# Patient Record
Sex: FEMALE | Race: WHITE | NOT HISPANIC OR LATINO | Employment: FULL TIME | URBAN - METROPOLITAN AREA
[De-identification: names, ages, dates, MRNs, and addresses within clinical notes are randomized per-mention and may not be internally consistent; named-entity substitution may affect disease eponyms.]

---

## 2017-05-11 ENCOUNTER — PATIENT MESSAGE (OUTPATIENT)
Dept: OBSTETRICS AND GYNECOLOGY | Facility: CLINIC | Age: 32
End: 2017-05-11

## 2017-05-12 ENCOUNTER — OFFICE VISIT (OUTPATIENT)
Dept: OBSTETRICS AND GYNECOLOGY | Facility: CLINIC | Age: 32
End: 2017-05-12
Payer: COMMERCIAL

## 2017-05-12 ENCOUNTER — TELEPHONE (OUTPATIENT)
Dept: OBSTETRICS AND GYNECOLOGY | Facility: CLINIC | Age: 32
End: 2017-05-12

## 2017-05-12 VITALS
DIASTOLIC BLOOD PRESSURE: 70 MMHG | HEIGHT: 63 IN | WEIGHT: 161.63 LBS | SYSTOLIC BLOOD PRESSURE: 90 MMHG | BODY MASS INDEX: 28.64 KG/M2

## 2017-05-12 DIAGNOSIS — R22.32 AXILLARY LUMP, LEFT: Primary | ICD-10-CM

## 2017-05-12 DIAGNOSIS — Z78.9 BREASTFEEDING (INFANT): ICD-10-CM

## 2017-05-12 PROCEDURE — 99999 PR PBB SHADOW E&M-EST. PATIENT-LVL III: CPT | Mod: PBBFAC,,, | Performed by: OBSTETRICS & GYNECOLOGY

## 2017-05-12 PROCEDURE — 1160F RVW MEDS BY RX/DR IN RCRD: CPT | Mod: S$GLB,,, | Performed by: OBSTETRICS & GYNECOLOGY

## 2017-05-12 PROCEDURE — 99213 OFFICE O/P EST LOW 20 MIN: CPT | Mod: S$GLB,,, | Performed by: OBSTETRICS & GYNECOLOGY

## 2017-05-12 NOTE — TELEPHONE ENCOUNTER
Spoke with pt. Pt advised she would need to be evaluated in the office prior to ordering any test. Pt verbalized understanding. Appt offered/schduled for today at 10:15am. Pt aware time/location/advised to review Mananner raudel

## 2017-05-12 NOTE — PROGRESS NOTES
"CC: left breast lump    Charis Wells is a 31 y.o. female  presents for a concern in her left breast.  She has dense tissue.  Feels a lump up in her left axilla.  NONTENDER. Breastfeeding      Past Medical History:   Diagnosis Date    Allergy     Chronic kidney disease     born with kidney disease been under control since age 12    Keloid cicatrix     Lipoma of unspecified site 2012       Past Surgical History:   Procedure Laterality Date     SECTION      COLONOSCOPY N/A 2016    Procedure: COLONOSCOPY - Miralax split prep;  Surgeon: Jose Antonio Jr., MD;  Location: [facility] ENDO;  Service: Endoscopy;  Laterality: N/A;    LIPOMA RESECTION      right buttocks    TENDON REPAIR      bilateral feet    TONSILLECTOMY         OB History    Para Term  AB SAB TAB Ectopic Multiple Living   2 1 1  1 1   0 1      # Outcome Date GA Lbr Edwin/2nd Weight Sex Delivery Anes PTL Lv   2 Term 16 39w3d  3.26 kg (7 lb 3 oz) F CS-LTranv EPI N Y      Complications: Failure to Progress in First Stage   1 SAB 2015 4w0d                 Family History   Problem Relation Age of Onset    Cancer Mother      breast    Breast cancer Mother 35     chemo, radiation, mastectomy    Cancer Maternal Grandmother      throat    Diabetes Paternal Grandfather     Melanoma Neg Hx     Colon cancer Neg Hx     Ovarian cancer Neg Hx        Social History   Substance Use Topics    Smoking status: Never Smoker    Smokeless tobacco: Never Used    Alcohol use No      Comment: occasional       BP 90/70  Ht 5' 3" (1.6 m)  Wt 73.3 kg (161 lb 9.6 oz)  LMP 2017  BMI 28.63 kg/m2    ROS:  GENERAL: Denies weight gain or weight loss. Feeling well overall.   SKIN: Denies rash or lesions.   HEAD: Denies head injury or headache.   NODES: Denies enlarged lymph nodes.   CHEST: Denies chest pain or shortness of breath.   CARDIOVASCULAR: Denies palpitations or left sided chest pain.   ABDOMEN: No " abdominal pain, constipation, diarrhea, nausea, vomiting or rectal bleeding.   URINARY: No frequency, dysuria, hematuria, or burning on urination.  REPRODUCTIVE: See HPI.   BREASTS: The patient performs breast self-examination and denies pain,FEELS lumps in her left axilla, NO nipple discharge.   HEMATOLOGIC: No easy bruisability or excessive bleeding.  MUSCULOSKELETAL: Denies joint pain or swelling.   NEUROLOGIC: Denies syncope or weakness.   PSYCHIATRIC: Denies depression, anxiety or mood swings.    Physical Exam:    PE:  APPEARANCE: Well nourished, well developed, in no acute distress.  AFFECT: WNL, alert and oriented x 3.  SKIN: No lesions.  NECK: Neck symmetric without masses or thyromegaly.  NODES: No inguinal, cervical, axillary or femoral lymph node enlargement.  CHEST: Good respiratory effort.   ABDOMEN: Soft. No tenderness or masses. No hepatosplenomegaly. No hernias.  BREASTS: Symmetrical, no skin changes or visible lesions. Left axilla has dense breast tissue,   no nipple discharge bilaterally.            ASSESSMENT AND PLAN  1. Axillary lump, left  US Breast Left Complete   2. Breastfeeding (infant)         Patient was counseled today on breast feeding and breast tissue.    F/U PRN

## 2017-05-12 NOTE — TELEPHONE ENCOUNTER
----- Message from Jordana Sahu MA sent at 5/11/2017  4:50 PM CDT -----  Patient states that she feels something over her armpit and would like a breast ultrasound.

## 2017-05-12 NOTE — MR AVS SNAPSHOT
"    [facility] - OB/GYN 5th Floor  [address]  [facility] [address]  Phone: [phone]                  Charis Wells   2017 10:15 AM   Office Visit    Description:  Female : 1985   Provider:  Lizzie Mackenzie MD   Department:  [address] - OB/GYN 5th Floor           Reason for Visit     Breast Problem                To Do List           Future Appointments        Provider Department Dept Phone    2017 9:15 AM Lizzie Mackenzie MD [facility] - OB/GYN [phone]      Goals (5 Years of Data)     None      [facility]     [facility] On Call Nurse Care Line -  Assistance  Unless otherwise directed by your provider, please contact [facility], our nurse care line that is available for  assistance.     Registered nurses in the [facility] On Call Center provide: appointment scheduling, clinical advisement, health education, and other advisory services.  Call: [phone] (toll free)               Medications           Message regarding Medications     Verify the changes and/or additions to your medication regime listed below are the same as discussed with your clinician today.  If any of these changes or additions are incorrect, please notify your healthcare provider.             Verify that the below list of medications is an accurate representation of the medications you are currently taking.  If none reported, the list may be blank. If incorrect, please contact your healthcare provider. Carry this list with you in case of emergency.           Current Medications     norethindrone (ORTHO MICRONOR) 0.35 mg tablet Take 1 tablet (0.35 mg total) by mouth once daily.    PNV95/FERROUS FUMARATE/FA (PRENATAL MULTIVITAMINS ORAL) Take by mouth Daily.           Clinical Reference Information           Your Vitals Were     BP Height Weight Last Period BMI    90/70 5' 3" (1.6 m) 73.3 kg (161 lb 9.6 oz) 2017 28.63 kg/m2      Blood Pressure          Most Recent Value    " BP  90/70      Allergies as of 5/12/2017     No Known Drug Allergies      Immunizations Administered on Date of Encounter - 5/12/2017     None      Language Assistance Services     ATTENTION: Language assistance services are available, free of charge. Please call [phone].      ATENCIÓN: Si habla elmer, tiene a padilla disposición servicios gratuitos de asistencia lingüística. Llame al [phone].     CHÚ Ý: N?u b?n nói Ti?ng Vi?t, có các d?ch v? h? tr? ngôn ng? mi?n phí dành cho b?n. G?i s? [phone].         [facility] - OB/GYN 5th Floor complies with applicable Federal civil rights laws and does not discriminate on the basis of race, color, national origin, age, disability, or sex.

## 2017-05-15 ENCOUNTER — HOSPITAL ENCOUNTER (OUTPATIENT)
Dept: RADIOLOGY | Facility: HOSPITAL | Age: 32
Discharge: HOME | End: 2017-05-15
Attending: OBSTETRICS & GYNECOLOGY
Payer: COMMERCIAL

## 2017-05-15 DIAGNOSIS — R22.32 AXILLARY LUMP, LEFT: ICD-10-CM

## 2017-05-15 PROCEDURE — 76642 ULTRASOUND BREAST LIMITED: CPT | Mod: TC,LT

## 2017-05-15 PROCEDURE — 76642 ULTRASOUND BREAST LIMITED: CPT | Mod: 26,LT,, | Performed by: RADIOLOGY

## 2017-06-14 ENCOUNTER — OFFICE VISIT (OUTPATIENT)
Dept: OBSTETRICS AND GYNECOLOGY | Facility: CLINIC | Age: 32
End: 2017-06-14
Payer: COMMERCIAL

## 2017-06-14 VITALS
SYSTOLIC BLOOD PRESSURE: 104 MMHG | BODY MASS INDEX: 29.41 KG/M2 | DIASTOLIC BLOOD PRESSURE: 72 MMHG | HEIGHT: 63 IN | WEIGHT: 166 LBS

## 2017-06-14 DIAGNOSIS — Z12.39 SCREENING FOR MALIGNANT NEOPLASM OF BREAST: Primary | ICD-10-CM

## 2017-06-14 DIAGNOSIS — Z80.3 FAMILY HISTORY OF BREAST CANCER: ICD-10-CM

## 2017-06-14 PROCEDURE — 99999 PR PBB SHADOW E&M-EST. PATIENT-LVL III: CPT | Mod: PBBFAC,,, | Performed by: OBSTETRICS & GYNECOLOGY

## 2017-06-14 PROCEDURE — 99395 PREV VISIT EST AGE 18-39: CPT | Mod: S$GLB,,, | Performed by: OBSTETRICS & GYNECOLOGY

## 2017-06-29 NOTE — PROGRESS NOTES
"CC: Well woman exam    Charis Wells is a 31 y.o. female  presents for a well woman exam.  She has no issues, problems, or complaints.  MAY CONSIDER FUTURE FERTILITY    Past Medical History:   Diagnosis Date    Allergy     Chronic kidney disease     born with kidney disease been under control since age 12    Keloid cicatrix     Lipoma of unspecified site 2012       Past Surgical History:   Procedure Laterality Date     SECTION      COLONOSCOPY N/A 2016    Procedure: COLONOSCOPY - Miralax split prep;  Surgeon: Jose Antonio Jr., MD;  Location: [facility];  Service: Endoscopy;  Laterality: N/A;    LIPOMA RESECTION      right buttocks    TENDON REPAIR      bilateral feet    TONSILLECTOMY         OB History    Para Term  AB Living   2 1 1   1 1   SAB TAB Ectopic Multiple Live Births   1     0 1      # Outcome Date GA Lbr Edwin/2nd Weight Sex Delivery Anes PTL Lv   2 Term 16 39w3d  3.26 kg (7 lb 3 oz) F CS-LTranv EPI N JEFFERSON      Complications: Failure to Progress in First Stage   1 SAB 2015 4w0d                 Family History   Problem Relation Age of Onset    Cancer Mother      breast    Breast cancer Mother 35     chemo, radiation, mastectomy    Cancer Maternal Grandmother      throat    Diabetes Paternal Grandfather     Melanoma Neg Hx     Colon cancer Neg Hx     Ovarian cancer Neg Hx        Social History   Substance Use Topics    Smoking status: Never Smoker    Smokeless tobacco: Never Used    Alcohol use No      Comment: occasional       /72   Ht 5' 3" (1.6 m)   Wt 75.3 kg (166 lb 0.1 oz)   LMP 2017 (Approximate)   Breastfeeding? No   BMI 29.41 kg/m²     ROS:  GENERAL: Denies weight gain or weight loss. Feeling well overall.   SKIN: Denies rash or lesions.   HEAD: Denies head injury or headache.   NODES: Denies enlarged lymph nodes.   CHEST: Denies chest pain or shortness of breath.   CARDIOVASCULAR: Denies palpitations " or left sided chest pain.   ABDOMEN: No abdominal pain, constipation, diarrhea, nausea, vomiting or rectal bleeding.   URINARY: No frequency, dysuria, hematuria, or burning on urination.  REPRODUCTIVE: See HPI.   BREASTS: The patient performs breast self-examination and denies pain, lumps, or nipple discharge.   HEMATOLOGIC: No easy bruisability or excessive bleeding.  MUSCULOSKELETAL: Denies joint pain or swelling.   NEUROLOGIC: Denies syncope or weakness.   PSYCHIATRIC: Denies depression, anxiety or mood swings.    Physical Exam:    APPEARANCE: Well nourished, well developed, in no acute distress.  AFFECT: WNL, alert and oriented x 3  SKIN: No acne or hirsutism  NECK: Neck symmetric without masses or thyromegaly  NODES: No inguinal, cervical, axillary, or femoral lymph node enlargement  CHEST: Good respiratory effect  ABDOMEN: Soft.  No tenderness or masses.  No hepatosplenomegaly.  No hernias.  BREASTS: Symmetrical, no skin changes or visible lesions.  No palpable masses, nipple discharge bilaterally.  PELVIC: Normal external genitalia without lesions.  Normal hair distribution.  Adequate perineal body, normal urethral meatus.  Vagina moist and well rugated without lesions or discharge.  Cervix pink, without lesions, discharge or tenderness.  No significant cystocele or rectocele.  Bimanual exam shows uterus to be normal size, regular, mobile and nontender.  Adnexa without masses or tenderness.    EXTREMITIES: No edema.    ASSESSMENT AND PLAN  1. Screening for malignant neoplasm of breast  Mammo Digital Screening Bilat with Tomosynthesis_CAD   2. Family history of breast cancer  Mammo Digital Screening Bilat with Tomosynthesis_CAD       Patient was counseled today on A.C.S. Pap guidelines and recommendations for yearly pelvic exams, mammograms and monthly self breast exams; to see her PCP for other health maintenance.     F/U PRN

## 2017-07-20 ENCOUNTER — HOSPITAL ENCOUNTER (OUTPATIENT)
Dept: RADIOLOGY | Facility: HOSPITAL | Age: 32
Discharge: HOME | End: 2017-07-20
Attending: OBSTETRICS & GYNECOLOGY
Payer: COMMERCIAL

## 2017-07-20 VITALS — WEIGHT: 166 LBS | HEIGHT: 63 IN | BODY MASS INDEX: 29.41 KG/M2

## 2017-07-20 DIAGNOSIS — Z12.31 VISIT FOR SCREENING MAMMOGRAM: ICD-10-CM

## 2017-07-20 DIAGNOSIS — Z80.3 FAMILY HISTORY OF BREAST CANCER: ICD-10-CM

## 2017-07-20 PROCEDURE — 77067 SCR MAMMO BI INCL CAD: CPT | Mod: TC

## 2017-07-20 PROCEDURE — 77067 SCR MAMMO BI INCL CAD: CPT | Mod: 26,,, | Performed by: RADIOLOGY

## 2017-07-20 PROCEDURE — 77063 BREAST TOMOSYNTHESIS BI: CPT | Mod: 26,,, | Performed by: RADIOLOGY

## 2017-07-21 ENCOUNTER — OFFICE VISIT (OUTPATIENT)
Dept: URGENT CARE | Facility: CLINIC | Age: 32
End: 2017-07-21
Payer: COMMERCIAL

## 2017-07-21 VITALS
DIASTOLIC BLOOD PRESSURE: 77 MMHG | HEIGHT: 63 IN | RESPIRATION RATE: 18 BRPM | TEMPERATURE: 99 F | OXYGEN SATURATION: 98 % | BODY MASS INDEX: 28.35 KG/M2 | WEIGHT: 160 LBS | HEART RATE: 92 BPM | SYSTOLIC BLOOD PRESSURE: 109 MMHG

## 2017-07-21 DIAGNOSIS — R11.0 NAUSEA: ICD-10-CM

## 2017-07-21 DIAGNOSIS — J02.0 STREP PHARYNGITIS: Primary | ICD-10-CM

## 2017-07-21 LAB
CTP QC/QA: YES
S PYO RRNA THROAT QL PROBE: POSITIVE

## 2017-07-21 PROCEDURE — 99213 OFFICE O/P EST LOW 20 MIN: CPT | Mod: 25,S$GLB,, | Performed by: NURSE PRACTITIONER

## 2017-07-21 PROCEDURE — 96372 THER/PROPH/DIAG INJ SC/IM: CPT | Mod: S$GLB,,, | Performed by: NURSE PRACTITIONER

## 2017-07-21 PROCEDURE — S0119 ONDANSETRON 4 MG: HCPCS | Mod: S$GLB,,, | Performed by: NURSE PRACTITIONER

## 2017-07-21 PROCEDURE — 87880 STREP A ASSAY W/OPTIC: CPT | Mod: QW,S$GLB,, | Performed by: NURSE PRACTITIONER

## 2017-07-21 RX ORDER — BETAMETHASONE SODIUM PHOSPHATE AND BETAMETHASONE ACETATE 3; 3 MG/ML; MG/ML
9 INJECTION, SUSPENSION INTRA-ARTICULAR; INTRALESIONAL; INTRAMUSCULAR; SOFT TISSUE
Status: COMPLETED | OUTPATIENT
Start: 2017-07-21 | End: 2017-07-21

## 2017-07-21 RX ORDER — AMOXICILLIN 875 MG/1
875 TABLET, FILM COATED ORAL 2 TIMES DAILY
Qty: 20 TABLET | Refills: 0 | Status: SHIPPED | OUTPATIENT
Start: 2017-07-21 | End: 2017-07-31

## 2017-07-21 RX ORDER — ONDANSETRON 4 MG/1
4 TABLET, ORALLY DISINTEGRATING ORAL
Status: COMPLETED | OUTPATIENT
Start: 2017-07-21 | End: 2017-07-21

## 2017-07-21 RX ORDER — ONDANSETRON 4 MG/1
4 TABLET, ORALLY DISINTEGRATING ORAL EVERY 6 HOURS PRN
Qty: 12 TABLET | Refills: 0 | Status: SHIPPED | OUTPATIENT
Start: 2017-07-21 | End: 2017-11-14

## 2017-07-21 RX ORDER — METHYLPREDNISOLONE 4 MG/1
TABLET ORAL
Qty: 1 PACKAGE | Refills: 0 | Status: SHIPPED | OUTPATIENT
Start: 2017-07-21 | End: 2017-11-14

## 2017-07-21 RX ADMIN — BETAMETHASONE SODIUM PHOSPHATE AND BETAMETHASONE ACETATE 9 MG: 3; 3 INJECTION, SUSPENSION INTRA-ARTICULAR; INTRALESIONAL; INTRAMUSCULAR; SOFT TISSUE at 06:07

## 2017-07-21 RX ADMIN — ONDANSETRON 4 MG: 4 TABLET, ORALLY DISINTEGRATING ORAL at 06:07

## 2017-07-21 NOTE — PROGRESS NOTES
"Subjective:       Patient ID: Charis Wells is a 31 y.o. female.    Vitals:  height is 5' 3" (1.6 m) and weight is 72.6 kg (160 lb). Her temperature is 98.9 °F (37.2 °C). Her blood pressure is 109/77 and her pulse is 92. Her respiration is 18 and oxygen saturation is 98%.     Chief Complaint: Sore Throat    Patient presents with Sore Throat & Body Aches. States she has been taking Tylenol with minimal releif. Also states she is having nausea and ha. Denies any sick contacts at this time.      Sore Throat    This is a new problem. The current episode started yesterday. The problem has been gradually worsening. Neither side of throat is experiencing more pain than the other. The maximum temperature recorded prior to her arrival was 102 - 102.9 F. The fever has been present for less than 1 day. The pain is at a severity of 8/10. The pain is mild. Associated symptoms include headaches, swollen glands and trouble swallowing. Pertinent negatives include no abdominal pain, congestion, coughing, diarrhea, ear pain, hoarse voice, shortness of breath or vomiting. She has had no exposure to strep. She has tried acetaminophen for the symptoms. The treatment provided no relief.     Review of Systems   Constitution: Positive for decreased appetite, fever and malaise/fatigue. Negative for chills.   HENT: Positive for headaches, sore throat and trouble swallowing. Negative for congestion, ear pain and hoarse voice.    Eyes: Negative for discharge and redness.   Cardiovascular: Negative for chest pain, dyspnea on exertion and leg swelling.   Respiratory: Negative for cough, shortness of breath, sputum production and wheezing.    Hematologic/Lymphatic: Positive for adenopathy.   Skin: Negative for rash.   Musculoskeletal: Positive for myalgias.   Gastrointestinal: Positive for nausea. Negative for abdominal pain, diarrhea and vomiting.   Neurological: Negative for dizziness and light-headedness.   All other systems reviewed " and are negative.      Objective:      Physical Exam   Constitutional: She is oriented to person, place, and time. Vital signs are normal. She appears well-developed and well-nourished.  Non-toxic appearance. She does not have a sickly appearance. She appears ill.   HENT:   Head: Normocephalic and atraumatic.   Right Ear: External ear normal.   Left Ear: External ear normal.   Nose: Nose normal.   Mouth/Throat: Uvula is midline and mucous membranes are normal. Posterior oropharyngeal erythema present. Tonsils are 2+ on the right. Tonsils are 2+ on the left. No tonsillar exudate.   Eyes: Conjunctivae and EOM are normal. Pupils are equal, round, and reactive to light.   Neck: Normal range of motion. Neck supple.   Cardiovascular: Normal rate, regular rhythm and normal heart sounds.    Pulmonary/Chest: Effort normal and breath sounds normal.   Abdominal: Bowel sounds are normal.   Musculoskeletal: Normal range of motion.   Lymphadenopathy:     She has cervical adenopathy.        Right cervical: Superficial cervical adenopathy present.        Left cervical: Superficial cervical adenopathy present.   Neurological: She is alert and oriented to person, place, and time.   Skin: Skin is warm and dry. Capillary refill takes less than 2 seconds.   Nursing note and vitals reviewed.      Assessment:       1. Strep pharyngitis    2. Nausea        Plan:         Strep pharyngitis  -     POCT rapid strep A  -     betamethasone acetate-betamethasone sodium phosphate injection 9 mg; Inject 1.5 mLs (9 mg total) into the muscle one time.  -     amoxicillin (AMOXIL) 875 MG tablet; Take 1 tablet (875 mg total) by mouth 2 (two) times daily.  Dispense: 20 tablet; Refill: 0  -     methylPREDNISolone (MEDROL DOSEPACK) 4 mg tablet; use as directed  Dispense: 1 Package; Refill: 0    Nausea  -     ondansetron disintegrating tablet 4 mg; Take 1 tablet (4 mg total) by mouth one time.    Other orders  -     ondansetron (ZOFRAN-ODT) 4 MG TbDL; Take  1 tablet (4 mg total) by mouth every 6 (six) hours as needed (nausea).  Dispense: 12 tablet; Refill: 0

## 2017-07-22 NOTE — PATIENT INSTRUCTIONS
Pharyngitis: Strep (Confirmed)       You have had a positive test for strep throat. Strep throat is a contagious illness. It is spread by coughing, kissing or by touching others after touching your mouth or nose. Symptoms include throat pain which is worse with swallowing, aching all over, headache and fever. It is treated with antibiotic medication. This should help you start to feel better within 1-2 days.  Home care  · Rest at home. Drink plenty of fluids to avoid dehydration.  · No work or school for the first 2 days of taking the antibiotics. After this time, you will not be contagious. You can then return to school or work if you are feeling better.   · The antibiotic medication must be taken for the full 10 days, even if you feel better. This is very important to ensure the infection is treated. It is also important to prevent drug-resistent organisms from developing. If you were given an antibiotic shot, no more antibiotics are needed.  · You may use acetaminophen (Tylenol) or ibuprofen (Motrin, Advil) to control pain or fever, unless another medicine was prescribed for this. (NOTE: If you have chronic liver or kidney disease or ever had a stomach ulcer or GI bleeding, talk with your doctor before using these medicines.)  · Throat lozenges or sprays (such as Chloraseptic) help reduce pain. Gargling with warm salt water will also reduce throat pain. Dissolve 1/2 teaspoon of salt in 1 glass of warm water. This may be useful just before meals.   · Soft foods are okay. Avoid salty or spicy foods.  Follow-up care  Follow up with your healthcare provider or our staff if you are not improving over the next week.  When to seek medical advice  Call your healthcare provider right away if any of these occur:  · Fever as directed by your doctor   · New or worsening ear pain, sinus pain, or headache  · Painful lumps in the back of neck  · Stiff neck  · Lymph nodes are getting larger or becoming soft in the  middle  · Inability to swallow liquids, excessive drooling, or inability to open mouth wide due to throat pain  · Signs of dehydration (very dark urine or no urine, sunken eyes, dizziness)  · Trouble breathing or noisy breathing  · Muffled voice  · New rash  Date Last Reviewed: 4/13/2015  © 4748-7676 [employer]. [address] [place] [address], [place] [address]. All rights reserved. This information is not intended as a substitute for professional medical care. Always follow your healthcare professional's instructions.

## 2017-10-19 ENCOUNTER — TELEPHONE (OUTPATIENT)
Dept: OBSTETRICS AND GYNECOLOGY | Facility: CLINIC | Age: 32
End: 2017-10-19

## 2017-10-19 NOTE — TELEPHONE ENCOUNTER
----- Message from Lizzie Mackenzie MD sent at 9/4/2017  4:58 PM CDT -----  Please make sure the patient has follow up with the breast center for further work up for her increased risk for lifetime breast cancer based on history.

## 2017-10-20 ENCOUNTER — TELEPHONE (OUTPATIENT)
Dept: OBSTETRICS AND GYNECOLOGY | Facility: CLINIC | Age: 32
End: 2017-10-20

## 2017-10-20 NOTE — TELEPHONE ENCOUNTER
Called patient. Gave patient number to make appointment with breast center ([phone]) based on an assessment score of 22.89%

## 2017-10-20 NOTE — TELEPHONE ENCOUNTER
----- Message from Beltran Parry sent at 10/20/2017  9:52 AM CDT -----  Contact: Pt   _z  1st Request  _  2nd Request  _  3rd Request        Who: RANDAL GONZALEZ [[id number]]    Why: Returning a call back from your office. Please return the call at earliest convenience.   Thanks!    What Number to Call Back:[phone]    When to Expect a call back: (With in 24 hours)

## 2017-11-14 ENCOUNTER — OFFICE VISIT (OUTPATIENT)
Dept: SURGERY | Facility: CLINIC | Age: 32
End: 2017-11-14
Payer: COMMERCIAL

## 2017-11-14 VITALS
DIASTOLIC BLOOD PRESSURE: 68 MMHG | SYSTOLIC BLOOD PRESSURE: 109 MMHG | HEIGHT: 63 IN | BODY MASS INDEX: 29.95 KG/M2 | WEIGHT: 169.06 LBS | TEMPERATURE: 98 F | HEART RATE: 59 BPM

## 2017-11-14 DIAGNOSIS — Z12.39 BREAST CANCER SCREENING, HIGH RISK PATIENT: Primary | ICD-10-CM

## 2017-11-14 DIAGNOSIS — Z80.3 FAMILY HISTORY OF BREAST CANCER: ICD-10-CM

## 2017-11-14 PROCEDURE — 99202 OFFICE O/P NEW SF 15 MIN: CPT | Mod: S$GLB,,, | Performed by: NURSE PRACTITIONER

## 2017-11-14 PROCEDURE — 99999 PR PBB SHADOW E&M-EST. PATIENT-LVL III: CPT | Mod: PBBFAC,,, | Performed by: NURSE PRACTITIONER

## 2017-11-14 NOTE — PROGRESS NOTES
Subjective:      Patient ID: Charis Wells is a 31 y.o. female.    Chief Complaint: Breast Cancer Screening (High Risk Screening)      HPI: (PF, EPF - 1-3) (Detailed, Comp, - 4) new patient presents to discuss breast cancer risk reported on mmg. Patient denies palpable breast mass, pain, nipple discharge, redness, increased warmth. She d/c breast feeding in 2017. She reports recent CBE, no current breast concerns, declined CBE today     Screening mmg 2017 with extremely dense breasts, Tyrer-Cuzick: 22.89 %.    Menarche at 17   first at 30  LMP 10-    Review of Systems  Objective:   Physical Exam  Assessment:       No diagnosis found.    Plan:       Reviewed results of mammogram and breast cancer risk per Tyrer Cuzick model.     Discussed family history and sporadic verses family clustering verses hereditary breast cancer. Recommended genetic testing for her mother, discussed implications for this patient if found to be positive verses negative, discussed limitations of a negative result in an unaffected individual. She states she will discuss with her mother, who would likely come here for genetic counseling, and will contact me with any results.     Discussed environmental and modifiable risk factors for breast cancer including obesity, alcohol use, smoking.     Discussed there are several models available to estimate a woman's risk for breast cancer with differences in variables contributing to the reported estimated risk compared to the general population risk for breast cancer. Tyrer Cuzick is the model chosen by [facility] and calculated risk was discussed. Discussed an individuals risk may be lower or higher than the score provided. Discussed general population risk for breast cancer and high risk now being defined by ACS and NCCN as 20% or greater. Discussed current recommendations for risk management by ACS and NCCN including increased breast cancer screenings:  semiannual CBE, annual mmg and annual MRI to alternate every 6 months. Discussed pros and cons of screening breast MRI.    Discussed smoking cessation, healthy diet and exercise, and limit alcohol intake to less than one drink/day.     At this time, will plan on her returning in 6 months for CBE and breast MRI as additional breast cancer screening, this may alter depending on her mother's genetic test results.

## 2017-12-08 ENCOUNTER — PATIENT MESSAGE (OUTPATIENT)
Dept: OBSTETRICS AND GYNECOLOGY | Facility: CLINIC | Age: 32
End: 2017-12-08

## 2017-12-11 ENCOUNTER — TELEPHONE (OUTPATIENT)
Dept: OBSTETRICS AND GYNECOLOGY | Facility: CLINIC | Age: 32
End: 2017-12-11

## 2017-12-11 NOTE — TELEPHONE ENCOUNTER
----- Message from Zay Key sent at 12/11/2017  8:37 AM CST -----  Please call new ob pt she had a pos upt now she is having a lot of bleeding [phone]

## 2017-12-12 ENCOUNTER — LAB VISIT (OUTPATIENT)
Dept: LAB | Facility: OTHER | Age: 32
End: 2017-12-12
Attending: OBSTETRICS & GYNECOLOGY
Payer: COMMERCIAL

## 2017-12-12 ENCOUNTER — OFFICE VISIT (OUTPATIENT)
Dept: OBSTETRICS AND GYNECOLOGY | Facility: CLINIC | Age: 32
End: 2017-12-12
Payer: COMMERCIAL

## 2017-12-12 VITALS
SYSTOLIC BLOOD PRESSURE: 116 MMHG | DIASTOLIC BLOOD PRESSURE: 60 MMHG | WEIGHT: 165.38 LBS | HEIGHT: 63 IN | BODY MASS INDEX: 29.3 KG/M2

## 2017-12-12 DIAGNOSIS — N93.9 VAGINAL BLEEDING: ICD-10-CM

## 2017-12-12 DIAGNOSIS — Z32.01 POSITIVE PREGNANCY TEST: ICD-10-CM

## 2017-12-12 DIAGNOSIS — Z67.91 RH NEGATIVE STATE IN ANTEPARTUM PERIOD: ICD-10-CM

## 2017-12-12 DIAGNOSIS — Z32.01 POSITIVE PREGNANCY TEST: Primary | ICD-10-CM

## 2017-12-12 DIAGNOSIS — O26.899 RH NEGATIVE STATE IN ANTEPARTUM PERIOD: ICD-10-CM

## 2017-12-12 LAB
ABO + RH BLD: NORMAL
HCG INTACT+B SERPL-ACNC: 201 MIU/ML
PROGEST SERPL-MCNC: 4 NG/ML

## 2017-12-12 PROCEDURE — 84144 ASSAY OF PROGESTERONE: CPT

## 2017-12-12 PROCEDURE — 84702 CHORIONIC GONADOTROPIN TEST: CPT

## 2017-12-12 PROCEDURE — 36415 COLL VENOUS BLD VENIPUNCTURE: CPT

## 2017-12-12 PROCEDURE — 99214 OFFICE O/P EST MOD 30 MIN: CPT | Mod: S$GLB,,, | Performed by: OBSTETRICS & GYNECOLOGY

## 2017-12-12 PROCEDURE — 87591 N.GONORRHOEAE DNA AMP PROB: CPT

## 2017-12-12 PROCEDURE — 99999 PR PBB SHADOW E&M-EST. PATIENT-LVL III: CPT | Mod: PBBFAC,,, | Performed by: OBSTETRICS & GYNECOLOGY

## 2017-12-12 PROCEDURE — 86901 BLOOD TYPING SEROLOGIC RH(D): CPT

## 2017-12-12 PROCEDURE — 86900 BLOOD TYPING SEROLOGIC ABO: CPT

## 2017-12-12 NOTE — PROGRESS NOTES
"CC: vaginal bleeding  Pos pregnancy test    Charis Wells is a 32 y.o. female  presents for vaginal bleeding.  NO pelvic pain or cramping.   Cycle on 17 and then bleeding on .  + UPT at home.      Past Medical History:   Diagnosis Date    Allergy     Chronic kidney disease     born with kidney disease been under control since age 12    Keloid cicatrix     Lipoma of unspecified site 2012       Past Surgical History:   Procedure Laterality Date     SECTION      COLONOSCOPY N/A 2016    Procedure: COLONOSCOPY - Miralax split prep;  Surgeon: Jose Antonio Jr., MD;  Location: [facility] ENDO;  Service: Endoscopy;  Laterality: N/A;    LIPOMA RESECTION      right buttocks    TENDON REPAIR      bilateral feet    TONSILLECTOMY         OB History    Para Term  AB Living   2 1 1   1 1   SAB TAB Ectopic Multiple Live Births   1     0 1      # Outcome Date GA Lbr Edwin/2nd Weight Sex Delivery Anes PTL Lv   2 Term 16 39w3d  3.26 kg (7 lb 3 oz) F CS-LTranv EPI N JEFFERSON      Complications: Failure to Progress in First Stage   1 SAB 2015 4w0d                 Family History   Problem Relation Age of Onset    Cancer Mother      breast    Breast cancer Mother 35     , negative genetic testing    Cancer Maternal Grandmother      throat    Diabetes Paternal Grandfather     Melanoma Neg Hx     Colon cancer Neg Hx     Ovarian cancer Neg Hx        Social History   Substance Use Topics    Smoking status: Never Smoker    Smokeless tobacco: Never Used    Alcohol use No      Comment: occasional       /60 (BP Location: Right arm, Patient Position: Sitting, BP Method: Medium (Manual))   Ht 5' 3" (1.6 m)   Wt 75 kg (165 lb 5.5 oz)   LMP 10/30/2017 (Exact Date)   Breastfeeding? No   BMI 29.29 kg/m²     ROS:  GENERAL: Denies weight gain or weight loss. Feeling well overall.   SKIN: Denies rash or lesions.   HEAD: Denies head injury or headache.   NODES: " Denies enlarged lymph nodes.   CHEST: Denies chest pain or shortness of breath.   CARDIOVASCULAR: Denies palpitations or left sided chest pain.   ABDOMEN: No abdominal pain, constipation, diarrhea, nausea, vomiting or rectal bleeding.   URINARY: No frequency, dysuria, hematuria, or burning on urination.  REPRODUCTIVE: See HPI.   BREASTS: The patient performs breast self-examination and denies pain, lumps, or nipple discharge.   HEMATOLOGIC: No easy bruisability or excessive bleeding.  MUSCULOSKELETAL: Denies joint pain or swelling.   NEUROLOGIC: Denies syncope or weakness.   PSYCHIATRIC: Denies depression, anxiety or mood swings.    Physical Exam:    APPEARANCE: Well nourished, well developed, in no acute distress.  AFFECT: WNL, alert and oriented x 3  SKIN: No acne or hirsutism  NECK: Neck symmetric without masses or thyromegaly  NODES: No inguinal, cervical, axillary, or femoral lymph node enlargement  CHEST: Good respiratory effect  ABDOMEN: Soft.  No tenderness or masses.  No hepatosplenomegaly.  No hernias.  PELVIC: Normal external genitalia without lesions.  Normal hair distribution.  Adequate perineal body, normal urethral meatus.  Vagina moist and well rugated without lesions or discharge.  NO blood in the vagina.   Cervix pink, without lesions, discharge or tenderness.  No significant cystocele or rectocele.  Bimanual exam shows uterus to be normal size, regular, mobile and nontender.  Adnexa without masses or tenderness.    EXTREMITIES: No edema.      ASSESSMENT AND PLAN  1. Positive pregnancy test  hCG, quantitative    Progesterone    Rho(D) Immune Globulin   2. Vaginal bleeding  hCG, quantitative    Progesterone   3. Rh negative state in antepartum period  Rho(D) Immune Globulin       Discussed early IUP vs possible MAB  RHOGAM given       Patient was counseled today on A.C.S. Pap guidelines and recommendations for yearly pelvic exams, mammograms and monthly self breast exams; to see her PCP for other  health maintenance.     Return in about 1 week (around 12/19/2017).

## 2017-12-13 ENCOUNTER — PATIENT MESSAGE (OUTPATIENT)
Dept: OBSTETRICS AND GYNECOLOGY | Facility: CLINIC | Age: 32
End: 2017-12-13

## 2017-12-13 ENCOUNTER — TELEPHONE (OUTPATIENT)
Dept: OBSTETRICS AND GYNECOLOGY | Facility: CLINIC | Age: 32
End: 2017-12-13

## 2017-12-13 DIAGNOSIS — Z32.01 PREGNANCY TEST POSITIVE: Primary | ICD-10-CM

## 2017-12-13 DIAGNOSIS — N93.9 VAGINAL BLEEDING: Primary | ICD-10-CM

## 2017-12-13 LAB
C TRACH DNA SPEC QL NAA+PROBE: NOT DETECTED
N GONORRHOEA DNA SPEC QL NAA+PROBE: NOT DETECTED

## 2017-12-13 NOTE — TELEPHONE ENCOUNTER
Patient is asking for results from her blood work on 17.  Patient NOT notified.   Progesterone: 4.0  HC  ABO/RH: A Neg      Patient will be scheduled to have blood work redrawn tomorrow.

## 2017-12-13 NOTE — TELEPHONE ENCOUNTER
Discussed result with patient.  Will repeat BHCG on Thursday.  Low progesterone.  Declines any progesterone.   Will repeat the BHCG on Thursday at [facility]. Please schedule appt

## 2017-12-14 ENCOUNTER — CLINICAL SUPPORT (OUTPATIENT)
Dept: OBSTETRICS AND GYNECOLOGY | Facility: CLINIC | Age: 32
End: 2017-12-14
Payer: COMMERCIAL

## 2017-12-14 ENCOUNTER — LAB VISIT (OUTPATIENT)
Dept: LAB | Facility: OTHER | Age: 32
End: 2017-12-14
Attending: OBSTETRICS & GYNECOLOGY
Payer: COMMERCIAL

## 2017-12-14 DIAGNOSIS — Z32.01 POSITIVE PREGNANCY TEST: ICD-10-CM

## 2017-12-14 DIAGNOSIS — O26.899 RH NEGATIVE STATE IN ANTEPARTUM PERIOD: ICD-10-CM

## 2017-12-14 DIAGNOSIS — Z32.01 PREGNANCY TEST POSITIVE: ICD-10-CM

## 2017-12-14 DIAGNOSIS — N93.9 VAGINAL BLEEDING: ICD-10-CM

## 2017-12-14 DIAGNOSIS — Z67.91 RH NEGATIVE STATE IN ANTEPARTUM PERIOD: ICD-10-CM

## 2017-12-14 LAB
HCG INTACT+B SERPL-ACNC: 271 MIU/ML
PROGEST SERPL-MCNC: 4.2 NG/ML

## 2017-12-14 PROCEDURE — 96372 THER/PROPH/DIAG INJ SC/IM: CPT | Mod: S$GLB,,, | Performed by: OBSTETRICS & GYNECOLOGY

## 2017-12-14 PROCEDURE — 84144 ASSAY OF PROGESTERONE: CPT

## 2017-12-14 PROCEDURE — 36415 COLL VENOUS BLD VENIPUNCTURE: CPT

## 2017-12-14 PROCEDURE — 99999 PR PBB SHADOW E&M-EST. PATIENT-LVL I: CPT | Mod: PBBFAC,,,

## 2017-12-14 PROCEDURE — 84702 CHORIONIC GONADOTROPIN TEST: CPT

## 2017-12-15 ENCOUNTER — TELEPHONE (OUTPATIENT)
Dept: OBSTETRICS AND GYNECOLOGY | Facility: CLINIC | Age: 32
End: 2017-12-15

## 2017-12-15 NOTE — TELEPHONE ENCOUNTER
Please advise.  Patient is requesting her HCG results. Patients LMP was 10/30/17 (6w4d). Patient's HCG juan manuel from 201 (12/12/17) to 271 (12/14/17).  Are the hormones rising appropriately?

## 2017-12-15 NOTE — TELEPHONE ENCOUNTER
Spoke with Dr. Hill. Patient advised that her levels (HCG and Progesterone) are not rising as expected. Patient advised to monitor for any bleeding or cramping and to go to the ER if symptoms get severe. Patient verbalized understanding.

## 2017-12-19 ENCOUNTER — HOSPITAL ENCOUNTER (OUTPATIENT)
Dept: RADIOLOGY | Facility: HOSPITAL | Age: 32
Discharge: HOME | End: 2017-12-19
Attending: OBSTETRICS & GYNECOLOGY
Payer: COMMERCIAL

## 2017-12-19 ENCOUNTER — TELEPHONE (OUTPATIENT)
Dept: OBSTETRICS AND GYNECOLOGY | Facility: CLINIC | Age: 32
End: 2017-12-19

## 2017-12-19 DIAGNOSIS — Z32.01 POSITIVE PREGNANCY TEST: ICD-10-CM

## 2017-12-19 DIAGNOSIS — Z32.01 POSITIVE PREGNANCY TEST: Primary | ICD-10-CM

## 2017-12-19 PROCEDURE — 76830 TRANSVAGINAL US NON-OB: CPT | Mod: 26,,, | Performed by: RADIOLOGY

## 2017-12-19 PROCEDURE — 76856 US EXAM PELVIC COMPLETE: CPT | Mod: 26,,, | Performed by: RADIOLOGY

## 2017-12-19 PROCEDURE — 76856 US EXAM PELVIC COMPLETE: CPT | Mod: TC

## 2017-12-19 NOTE — TELEPHONE ENCOUNTER
The BHCG is increasing but it is not rising appropriately.  I recommend for you to have an US. ORDERS ARE IN.  If you are having any pain or signifcant cramping I need for you to come in for evaluation

## 2017-12-19 NOTE — TELEPHONE ENCOUNTER
Patient is going to main campus to have US done today. Patient advise of the address ([address]) and also to have a full bladder. Patient verbalized understanding.

## 2017-12-20 ENCOUNTER — TELEPHONE (OUTPATIENT)
Dept: OBSTETRICS AND GYNECOLOGY | Facility: CLINIC | Age: 32
End: 2017-12-20

## 2017-12-20 DIAGNOSIS — Z32.01 POSITIVE PREGNANCY TEST: Primary | ICD-10-CM

## 2017-12-20 RX ORDER — PROGESTERONE 200 MG/1
200 CAPSULE ORAL NIGHTLY
Qty: 30 CAPSULE | Refills: 11 | Status: SHIPPED | OUTPATIENT
Start: 2017-12-20 | End: 2018-01-04

## 2017-12-21 ENCOUNTER — LAB VISIT (OUTPATIENT)
Dept: LAB | Facility: OTHER | Age: 32
End: 2017-12-21
Attending: OBSTETRICS & GYNECOLOGY
Payer: COMMERCIAL

## 2017-12-21 DIAGNOSIS — Z32.01 POSITIVE PREGNANCY TEST: ICD-10-CM

## 2017-12-21 LAB
HCG INTACT+B SERPL-ACNC: 661 MIU/ML
PROGEST SERPL-MCNC: 1.9 NG/ML

## 2017-12-21 PROCEDURE — 84702 CHORIONIC GONADOTROPIN TEST: CPT

## 2017-12-21 PROCEDURE — 36415 COLL VENOUS BLD VENIPUNCTURE: CPT

## 2017-12-21 PROCEDURE — 84144 ASSAY OF PROGESTERONE: CPT

## 2017-12-21 NOTE — TELEPHONE ENCOUNTER
Patient notified of prescription. Patient also scheduled for labs. Patient verbalized understanding.

## 2017-12-21 NOTE — TELEPHONE ENCOUNTER
You can take prometrium 200 mcg daily . Rx sent.  Bleeding and pain precautions,  F/U neck week with me.

## 2017-12-26 ENCOUNTER — TELEPHONE (OUTPATIENT)
Dept: OBSTETRICS AND GYNECOLOGY | Facility: CLINIC | Age: 32
End: 2017-12-26

## 2017-12-26 DIAGNOSIS — Z32.01 POSITIVE PREGNANCY TEST: Primary | ICD-10-CM

## 2017-12-26 DIAGNOSIS — N93.9 VAGINAL BLEEDING: ICD-10-CM

## 2017-12-26 NOTE — TELEPHONE ENCOUNTER
----- Message from Gio Jaffe sent at 12/22/2017  4:48 PM CST -----  Contact: Pt  X_ 1st Request  _ 2nd Request  _ 3rd Request    Who: RANDAL GONZALEZ [[id number]]    Why: Patient would like to speak with staff in regards to test results    What Number to Call Back: [phone]    When to Expect a call back: (Before the end of the day)  -- if call after 3:00 call back will be tomorrow.

## 2017-12-26 NOTE — TELEPHONE ENCOUNTER
Please come in for a repeat BHCG and pelvic US/ I will need to see you tomorrow in office and consider possible ectopic pregnancy vs abnormal early pregnancy.  The BHCG Is rising but it is not rising appropriately.  IF having any severe pain or bleeding please go to the ER

## 2017-12-27 ENCOUNTER — HOSPITAL ENCOUNTER (OUTPATIENT)
Dept: RADIOLOGY | Facility: OTHER | Age: 32
Discharge: HOME | End: 2017-12-27
Attending: OBSTETRICS & GYNECOLOGY
Payer: COMMERCIAL

## 2017-12-27 DIAGNOSIS — Z32.01 POSITIVE PREGNANCY TEST: ICD-10-CM

## 2017-12-27 DIAGNOSIS — N93.9 VAGINAL BLEEDING: ICD-10-CM

## 2017-12-27 PROCEDURE — 76801 OB US < 14 WKS SINGLE FETUS: CPT | Mod: 26,,, | Performed by: RADIOLOGY

## 2017-12-27 PROCEDURE — 76817 TRANSVAGINAL US OBSTETRIC: CPT | Mod: TC

## 2017-12-27 PROCEDURE — 76817 TRANSVAGINAL US OBSTETRIC: CPT | Mod: 26,,, | Performed by: RADIOLOGY

## 2017-12-27 PROCEDURE — 76801 OB US < 14 WKS SINGLE FETUS: CPT | Mod: TC

## 2017-12-28 ENCOUNTER — HOSPITAL ENCOUNTER (EMERGENCY)
Facility: OTHER | Age: 32
Discharge: HOME | End: 2017-12-28
Attending: EMERGENCY MEDICINE
Payer: COMMERCIAL

## 2017-12-28 ENCOUNTER — PATIENT MESSAGE (OUTPATIENT)
Dept: OBSTETRICS AND GYNECOLOGY | Facility: CLINIC | Age: 32
End: 2017-12-28

## 2017-12-28 ENCOUNTER — TELEPHONE (OUTPATIENT)
Dept: OBSTETRICS AND GYNECOLOGY | Facility: CLINIC | Age: 32
End: 2017-12-28

## 2017-12-28 VITALS
HEIGHT: 63 IN | HEART RATE: 76 BPM | WEIGHT: 155 LBS | BODY MASS INDEX: 27.46 KG/M2 | OXYGEN SATURATION: 100 % | SYSTOLIC BLOOD PRESSURE: 110 MMHG | TEMPERATURE: 98 F | DIASTOLIC BLOOD PRESSURE: 69 MMHG | RESPIRATION RATE: 18 BRPM

## 2017-12-28 DIAGNOSIS — O00.90 ECTOPIC PREGNANCY, UNSPECIFIED LOCATION, UNSPECIFIED WHETHER INTRAUTERINE PREGNANCY PRESENT: Primary | ICD-10-CM

## 2017-12-28 LAB
ALBUMIN SERPL BCP-MCNC: 3.9 G/DL
ALP SERPL-CCNC: 49 U/L
ALT SERPL W/O P-5'-P-CCNC: 25 U/L
ANION GAP SERPL CALC-SCNC: 13 MMOL/L
AST SERPL-CCNC: 23 U/L
B-HCG UR QL: POSITIVE
BASOPHILS # BLD AUTO: 0.04 K/UL
BASOPHILS NFR BLD: 0.4 %
BILIRUB SERPL-MCNC: 0.3 MG/DL
BILIRUB UR QL STRIP: NEGATIVE
BUN SERPL-MCNC: 14 MG/DL
CALCIUM SERPL-MCNC: 9.4 MG/DL
CHLORIDE SERPL-SCNC: 105 MMOL/L
CLARITY UR: CLEAR
CO2 SERPL-SCNC: 22 MMOL/L
COLOR UR: YELLOW
CREAT SERPL-MCNC: 0.7 MG/DL
CTP QC/QA: YES
DIFFERENTIAL METHOD: NORMAL
EOSINOPHIL # BLD AUTO: 0.1 K/UL
EOSINOPHIL NFR BLD: 1.1 %
ERYTHROCYTE [DISTWIDTH] IN BLOOD BY AUTOMATED COUNT: 13.3 %
EST. GFR  (AFRICAN AMERICAN): >60 ML/MIN/1.73 M^2
EST. GFR  (NON AFRICAN AMERICAN): >60 ML/MIN/1.73 M^2
GLUCOSE SERPL-MCNC: 91 MG/DL
GLUCOSE UR QL STRIP: NEGATIVE
HCG INTACT+B SERPL-ACNC: 1032 MIU/ML
HCT VFR BLD AUTO: 42.3 %
HGB BLD-MCNC: 14.5 G/DL
HGB UR QL STRIP: ABNORMAL
KETONES UR QL STRIP: NEGATIVE
LEUKOCYTE ESTERASE UR QL STRIP: NEGATIVE
LYMPHOCYTES # BLD AUTO: 3.1 K/UL
LYMPHOCYTES NFR BLD: 29.6 %
MCH RBC QN AUTO: 29.2 PG
MCHC RBC AUTO-ENTMCNC: 34.3 G/DL
MCV RBC AUTO: 85 FL
MICROSCOPIC COMMENT: NORMAL
MONOCYTES # BLD AUTO: 0.6 K/UL
MONOCYTES NFR BLD: 5.8 %
NEUTROPHILS # BLD AUTO: 6.6 K/UL
NEUTROPHILS NFR BLD: 62.8 %
NITRITE UR QL STRIP: NEGATIVE
PH UR STRIP: 6 [PH] (ref 5–8)
PLATELET # BLD AUTO: 282 K/UL
PMV BLD AUTO: 9.6 FL
POTASSIUM SERPL-SCNC: 4 MMOL/L
PROT SERPL-MCNC: 7.2 G/DL
PROT UR QL STRIP: NEGATIVE
RBC # BLD AUTO: 4.97 M/UL
RBC #/AREA URNS HPF: 3 /HPF (ref 0–4)
SODIUM SERPL-SCNC: 140 MMOL/L
SP GR UR STRIP: 1.02 (ref 1–1.03)
SQUAMOUS #/AREA URNS HPF: 1 /HPF
URN SPEC COLLECT METH UR: ABNORMAL
UROBILINOGEN UR STRIP-ACNC: NEGATIVE EU/DL
WBC # BLD AUTO: 10.44 K/UL

## 2017-12-28 PROCEDURE — 81025 URINE PREGNANCY TEST: CPT | Performed by: PHYSICIAN ASSISTANT

## 2017-12-28 PROCEDURE — 85025 COMPLETE CBC W/AUTO DIFF WBC: CPT

## 2017-12-28 PROCEDURE — 63600175 PHARM REV CODE 636 W HCPCS: Performed by: STUDENT IN AN ORGANIZED HEALTH CARE EDUCATION/TRAINING PROGRAM

## 2017-12-28 PROCEDURE — 99284 EMERGENCY DEPT VISIT MOD MDM: CPT | Mod: 25

## 2017-12-28 PROCEDURE — 84702 CHORIONIC GONADOTROPIN TEST: CPT

## 2017-12-28 PROCEDURE — 80053 COMPREHEN METABOLIC PANEL: CPT

## 2017-12-28 PROCEDURE — 96372 THER/PROPH/DIAG INJ SC/IM: CPT

## 2017-12-28 PROCEDURE — 81000 URINALYSIS NONAUTO W/SCOPE: CPT

## 2017-12-28 RX ORDER — METHOTREXATE 25 MG/ML
50 INJECTION INTRA-ARTERIAL; INTRAMUSCULAR; INTRATHECAL; INTRAVENOUS ONCE
Status: COMPLETED | OUTPATIENT
Start: 2017-12-28 | End: 2017-12-28

## 2017-12-28 RX ADMIN — METHOTREXATE 90 MG: 25 INJECTION, SOLUTION INTRA-ARTERIAL; INTRAMUSCULAR; INTRATHECAL; INTRAVENOUS at 09:12

## 2017-12-28 NOTE — TELEPHONE ENCOUNTER
Per Dr. Mackenzie:  Patient advised to go to the ED due to having a possible ectopic pregnancy based on recent labs and US. Patiet advised that she cannot get methotrexate in clinic. Patient verbalized understanding. Patient stated that she will get her  and go to [facility] operated by [facility] ED today.

## 2017-12-29 ENCOUNTER — PATIENT MESSAGE (OUTPATIENT)
Dept: OBSTETRICS AND GYNECOLOGY | Facility: CLINIC | Age: 32
End: 2017-12-29

## 2017-12-29 ENCOUNTER — TELEPHONE (OUTPATIENT)
Dept: OBSTETRICS AND GYNECOLOGY | Facility: CLINIC | Age: 32
End: 2017-12-29

## 2017-12-29 DIAGNOSIS — O00.90 ECTOPIC PREGNANCY, UNSPECIFIED LOCATION, UNSPECIFIED WHETHER INTRAUTERINE PREGNANCY PRESENT: Primary | ICD-10-CM

## 2017-12-29 NOTE — ED NOTES
Pt to ED with c/o intermittent vaginal bleeding x 3 days. Unable to quantify amount. Reports  She believes her LMP was at approx. the end of november. It ended, she then had spotting, called her OB gyn, was instructed to take home pregnancy tests, reports she took multiple, had positive pregnancy test. Pt AAOx4 and appropriate at this time. Respirations even and unlabored. No acute distress noted. Denies dysuria, vaginal discharge, or fever/chills. Pt reporting intermittent bilateral lower abdominal pain and lower back pain. Denies nausea, vomiting, or diarrhea.

## 2017-12-29 NOTE — TELEPHONE ENCOUNTER
Patient missed a phone call be sent a message in response.     Dr Avril Summers. I got your voicemail - sorry I missed your call! Everything at the ER went fine last night. Im feeling ok today (so far). Im extremely nauseous, but no cramping or bleeding yet.

## 2017-12-29 NOTE — ED PROVIDER NOTES
Encounter Date: 2017       History     Chief Complaint   Patient presents with    Vaginal Bleeding     x2 days pta reports being approx 8 weeks pregnant, spoke with OB today and was told she needed to come to ED for ectopic rule out, denies any other symptoms at this time      Patient is a 32 y.o. Female,  at estimated 8w3d gestational age, presenting to the emergency department with complaints of vaginal bleeding in pregnancy.  The patient reports that 2 days ago she started experiencing vaginal spotting.  She reports some mild associated lower abdominal pain and cramping.  She denies vomiting or diarrhea.  She reports that she was scheduled for outpatient labs and an ultrasound by her OB/GYN, Dr. Mackenzie.  She states that earlier this evening, she was called by nurse in regards to her ultrasound report.  She states she was told me they to come to the emergency department for further evaluation.  She states she was told that her hormone levels are increasing, however not appropriately and there is concern for ectopic pregnancy.  She is in no acute distress at this time.  She is no complaint this time.      The history is provided by the patient.     Review of patient's allergies indicates:   Allergen Reactions    No known drug allergies      Past Medical History:   Diagnosis Date    Allergy     Chronic kidney disease     born with kidney disease been under control since age 12    Keloid cicatrix     Lipoma of unspecified site 2012     Past Surgical History:   Procedure Laterality Date     SECTION      COLONOSCOPY N/A 2016    Procedure: COLONOSCOPY - Miralax split prep;  Surgeon: Jose Antonio Jr., MD;  Location: [facility];  Service: Endoscopy;  Laterality: N/A;    LIPOMA RESECTION      right buttocks    TENDON REPAIR      bilateral feet    TONSILLECTOMY       Family History   Problem Relation Age of Onset    Cancer Mother      breast    Breast cancer Mother 35     ,  negative genetic testing    Cancer Maternal Grandmother      throat    Diabetes Paternal Grandfather     Melanoma Neg Hx     Colon cancer Neg Hx     Ovarian cancer Neg Hx      Social History   Substance Use Topics    Smoking status: Never Smoker    Smokeless tobacco: Never Used    Alcohol use No      Comment: occasional     Review of Systems   Constitutional: Negative for activity change, appetite change, chills, fatigue and fever.   HENT: Negative for congestion, rhinorrhea and sore throat.    Eyes: Negative for photophobia and visual disturbance.   Respiratory: Negative for cough, shortness of breath and wheezing.    Cardiovascular: Negative for chest pain.   Gastrointestinal: Positive for abdominal pain. Negative for diarrhea, nausea and vomiting.   Genitourinary: Positive for vaginal bleeding. Negative for dysuria, hematuria and urgency.   Musculoskeletal: Negative for back pain, myalgias and neck pain.   Skin: Negative for color change and wound.   Neurological: Negative for weakness and headaches.   Psychiatric/Behavioral: Negative for agitation and confusion.       Physical Exam     Initial Vitals [12/28/17 1916]   BP Pulse Resp Temp SpO2   117/62 85 14 98.1 °F (36.7 °C) 98 %      MAP       80.33         Physical Exam    Nursing note and vitals reviewed.  Constitutional: Vital signs are normal. She appears well-developed and well-nourished. She is not diaphoretic. She is cooperative.  Non-toxic appearance. She does not have a sickly appearance. She does not appear ill. No distress.   Well appearing,  female accompanied by her  in the emergency department.  She speaking clear and full sentences.  She is in no acute distress.   HENT:   Head: Normocephalic and atraumatic.   Right Ear: External ear normal.   Left Ear: External ear normal.   Nose: Nose normal.   Mouth/Throat: Oropharynx is clear and moist.   Eyes: Conjunctivae and EOM are normal.   Neck: Normal range of motion. Neck supple.    Cardiovascular: Normal rate, regular rhythm and normal heart sounds.   Pulmonary/Chest: Breath sounds normal. No respiratory distress. She has no wheezes.   Abdominal: Soft. Bowel sounds are normal. She exhibits no distension. There is no tenderness. There is no rebound, no guarding and no CVA tenderness.   Genitourinary:   Genitourinary Comments: Deferred to OB/GYN   Musculoskeletal: Normal range of motion.   Neurological: She is alert and oriented to person, place, and time.   Skin: Skin is warm.   Psychiatric: She has a normal mood and affect. Her behavior is normal. Judgment and thought content normal.         ED Course   Procedures  Labs Reviewed   COMPREHENSIVE METABOLIC PANEL - Abnormal; Notable for the following:        Result Value    CO2 22 (*)     Alkaline Phosphatase 49 (*)     All other components within normal limits    Narrative:     Recoll. [id number] by TJS at 12/28/2017 20:35, reason: Specimen   hemolyzed  Spoke to [facility] to recollect   URINALYSIS - Abnormal; Notable for the following:     Occult Blood UA 1+ (*)     All other components within normal limits   POCT URINE PREGNANCY - Abnormal; Notable for the following:     POC Preg Test, Ur Positive (*)     All other components within normal limits   CBC W/ AUTO DIFFERENTIAL   HCG, QUANTITATIVE, PREGNANCY    Narrative:     Recoll. [id number] by TJS at 12/28/2017 20:35, reason: Specimen   hemolyzed  Spoke to [facility] to recollect   URINALYSIS MICROSCOPIC   TYPE & SCREEN   PREPARE RH IMMUNE GLOBULIN        US Transvaginal OB Less than 14 Weeks  Impression:   Failed early pregnancy, with no intrauterine gestational sac identifiable.    Electronically signed by: ADAM RODARTE  Date: 12/27/17  Time: 16:15        Medical Decision Making:   History:   Old Medical Records: I decided to obtain old medical records.  Old Records Summarized: other records.       <> Summary of Records: Reviewed medical record in Epic.  Beta hCG on  17 is 485.  On 17, it is 661.  On 17, it is 941.  Patient received RhoGam in clinic on 17  Initial Assessment:   Urgent evaluation of a 32 y.o. Female,  at 8w3d, presenting to the emergency department complaining of vaginal bleeding in pregnancy with concern for ectopic pregnancy. Patient is afebrile, nontoxic appearing and hemodynamically stable.  Physical exam reveals regular rate and rhythm, lungs are clear to auscultation bilaterally.  Abdomen is soft and nontender.  Reviewed medical record in Epic as noted above.  Will plan for labs, page OB/GYN.  Clinical Tests:   Lab Tests: Ordered and Reviewed  The following lab test(s) were unremarkable: UPT, CBC, CMP and B-HCG  ED Management:  Patient recently received RhoGam as noted above, per her OB/GYN, she does not require another dose.  CMP is unremarkable.  Beta hCG today is 1032.  CBC is within normal limits.  UA shows no acute findings.  Patient was seen and evaluated by Dr. Merchant with OB/GYN.  Given methotrexate in the emergency department.  Counseled on further symptomatically care and treatment.  Recommended to follow-up as advised with OB/GYN in clinic.  Stable for discharge. The patient was instructed to follow up with a primary care provider in 2 days or to return to the emergency department for worsening symptoms. The treatment plan was discussed with the patient who demonstrated understanding and comfort with plan. The patient's history, physical exam, and plan of care was discussed with and agreed upon with my supervising physician.    Other:   I have discussed this case with another health care provider.       <> Summary of the Discussion: Dr. Ortega  This note was created using Dragon Medical Dictation. There may be typographical errors secondary to dictation.                    ED Course      Clinical Impression:     1. Ectopic pregnancy, unspecified location, unspecified whether intrauterine pregnancy present        Disposition:   Disposition: Discharged  Condition: Stable                        Sabine Hawley PA-C  12/28/17 5034

## 2017-12-29 NOTE — ED NOTES
Pt rounding complete. Pt sitting up in bed talking to , Restroom and comfort needs addressed.  Pt updated on plan of care.  Call light within reach.  Will continue to monitor.

## 2017-12-29 NOTE — ED NOTES
Appearance: Pt awake, alert & oriented to person, place & time. Pt in no acute distress at present time. Pt is clean and well groomed with clothes appropriately fastened.   Skin: Skin warm, dry & intact. Color consistent with ethnicity. Mucous membranes moist. No breakdown or brusing noted.   Musculoskeletal: Patient moving all extremities well, no obvious swelling or deformities noted.   Respiratory: Respirations spontaneous, even, and non-labored. Visible chest rise noted. Airway is open and patent. No accessory muscle use noted.   Neurologic: Sensation is intact. Speech is clear and appropriate. Eyes open spontaneously, behavior appropriate to situation, follows commands, facial expression symmetrical, bilateral hand grasp equal and even, purposeful motor response noted.  Cardiac: All peripheral pulses present. No Bilateral lower extremity edema. Cap refill is <3 seconds. Pt denies active chest pains, SOB, dizziness, blurred vision, weakness or fatigue at this time.   Abdomen: Abdomen soft, non-tender to palpation. Pt denies active abd pains, cramping or discomfort, No N/V/D at this time.   : SEE HPI.

## 2017-12-29 NOTE — TELEPHONE ENCOUNTER
----- Message from Gonzales Graham sent at 12/29/2017  8:18 AM CST -----  Contact: patient  x_ 1st Request   _ 2nd Request   _ 3rd Request     Who: RANDAL GONZALEZ [[id number]]    Why: patient called stated she went to the ED yesterday per Dr Avril east and wants to know if she still needs to come to her 9:00 appointment today.  Please call the patient.    What Number to Call Back: [phone]    When to Expect a call back: (Before the end of the day)   -- if call after 3:00 call back will be tomorrow.

## 2017-12-29 NOTE — HPI
Patient is a 32 y.o. Female,  at estimated 8w3d gestational age, presenting to the emergency department with complaints of vaginal bleeding in pregnancy. Patient states she had vaginal bleeding 2 weeks ago, which stopped after a couple of days. She was given rhogam in Dr. Mackenzie's office. She reports vaginal bleeding started 2 days ago, roughly using 5 tampons a day. She reports some mild associated lower abdominal pain and cramping.  She denies vomiting or diarrhea.  She reports that she was scheduled for outpatient labs and an ultrasound by her OB/GYN, Dr. Mackenzie.  She states that earlier this evening, she was called by nurse in regards to her ultrasound report.  She states she was told me they to come to the emergency department for further evaluation.  She states she was told that her hormone levels are increasing, however not appropriately and there is concern for ectopic pregnancy.  She is in no acute distress at this time.

## 2017-12-29 NOTE — TELEPHONE ENCOUNTER
OK.  Let me know if you are having any significant pain. Mild pain is ok but if the pain is more intense please notify the office.  Please follow up day four with me in the office.

## 2017-12-30 PROBLEM — O00.90 ECTOPIC PREGNANCY: Status: ACTIVE | Noted: 2017-12-30

## 2017-12-31 NOTE — ASSESSMENT & PLAN NOTE
- presumed ectopic pregnancy with no IUP seen on ultrasound  - Beta HCG trending inappropriately  241>485>661>941>1032  - VSS  - CBC H/H 14/42, no WBC  - CMP WNL  - non acute abdomen  - Can be safely treated without surgery at this time   - discussed at lengths risk of untreated ectopic pregnancy  - After consent and risk associations, MTX injection given  - patient to f/u on day 4 and day 7 for beta-hcg lab level   - MTX precaution/side effects discussed, patient understand  - ED precautions given  - patient added to beta-hcg book  - beta level order plaaced

## 2017-12-31 NOTE — CONSULTS
[facility]  Obstetrics & Gynecology  Consult Note    Patient Name: Charis Wells  MRN: [id number]  Admission Date: 2017  Hospital Length of Stay: 0 days  Code Status: Prior  Primary Care Provider: Mariajose Ma MD  Principal Problem: Ectopic pregnancy    Consults  Subjective:     Chief Complaint: vaginal bleeding    History of Present Illness:  Patient is a 32 y.o. Female,  at estimated 8w3d gestational age, presenting to the emergency department with complaints of vaginal bleeding in pregnancy. Patient states she had vaginal bleeding 2 weeks ago, which stopped after a couple of days. She was given rhogam in Dr. Mackenzie's office. She reports vaginal bleeding started 2 days ago, roughly using 5 tampons a day. She reports some mild associated lower abdominal pain and cramping.  She denies vomiting or diarrhea.  She reports that she was scheduled for outpatient labs and an ultrasound by her OB/GYN, Dr. Mackenzie.  She states that earlier this evening, she was called by nurse in regards to her ultrasound report.  She states she was told me they to come to the emergency department for further evaluation.  She states she was told that her hormone levels are increasing, however not appropriately and there is concern for ectopic pregnancy.  She is in no acute distress at this time.           Obstetric History       T1      L1     SAB1   TAB0   Ectopic0   Multiple0   Live Births1       # Outcome Date GA Lbr Edwin/2nd Weight Sex Delivery Anes PTL Lv   2 Term 16 39w3d  3.26 kg (7 lb 3 oz) F CS-LTranv EPI N JEFFERSON      Complications: Failure to Progress in First Stage      Apgar1:  9                Apgar5: 9   1 SAB 2015 4w0d               Past Medical History:   Diagnosis Date    Allergy     Chronic kidney disease     born with kidney disease been under control since age 12    Keloid cicatrix     Lipoma of unspecified site 2012     Past Surgical History:   Procedure  Laterality Date     SECTION      COLONOSCOPY N/A 2016    Procedure: COLONOSCOPY - Miralax split prep;  Surgeon: Jose Antonio Jr., MD;  Location: [facility];  Service: Endoscopy;  Laterality: N/A;    LIPOMA RESECTION      right buttocks    TENDON REPAIR      bilateral feet    TONSILLECTOMY           (Not in a hospital admission)    Review of patient's allergies indicates:   Allergen Reactions    No known drug allergies         Family History     Problem Relation (Age of Onset)    Breast cancer Mother (35)    Cancer Mother, Maternal Grandmother    Diabetes Paternal Grandfather        Social History Main Topics    Smoking status: Never Smoker    Smokeless tobacco: Never Used    Alcohol use No      Comment: occasional    Drug use: No    Sexual activity: Yes     Partners: Male     Birth control/ protection: None     Review of Systems   Constitutional: Negative for fever.   Respiratory: Negative for cough and shortness of breath.    Cardiovascular: Negative for leg swelling.   Gastrointestinal: Positive for abdominal pain. Negative for vomiting.   Genitourinary: Positive for vaginal bleeding. Negative for vaginal discharge.   Neurological: Negative for headaches.   Hematological: Does not bruise/bleed easily.   All other systems reviewed and are negative.     Objective:     Vital Signs (Most Recent):  Temp: 98 °F (36.7 °C) (17)  Pulse: 76 (17)  Resp: 18 (17)  BP: 110/69 (17)  SpO2: 100 % (17) Vital Signs (24h Range):        Weight: 70.3 kg (155 lb)  Body mass index is 27.46 kg/m².    No LMP recorded.    Physical Exam:   Constitutional: She is oriented to person, place, and time. She appears well-developed and well-nourished. No distress.    HENT:   Head: Normocephalic and atraumatic.      Cardiovascular: Normal rate, regular rhythm and normal heart sounds.     Pulmonary/Chest: Effort normal and breath sounds normal.        Abdominal:  Soft. Bowel sounds are normal. She exhibits no distension. There is tenderness.   Slight RLQ tenderness to palpation      Genitourinary: Right adnexum displays no mass and no tenderness. Left adnexum displays no mass and no tenderness.       Genitourinary Comments: No adnexal tenderness on bimanual exam         Uterus Size: 6 cm   Musculoskeletal: Normal range of motion. She exhibits no edema.       Neurological: She is alert and oriented to person, place, and time.    Skin: Skin is warm and dry.    Psychiatric: She has a normal mood and affect.       Laboratory:  Beta HCG: No results for input(s): HCGPREGUR in the last 48 hours.  CBC:   Recent Labs  Lab 12/28/17 2016   WBC 10.44   RBC 4.97   HGB 14.5   HCT 42.3      MCV 85   MCH 29.2   MCHC 34.3     CMP:   Recent Labs  Lab 12/28/17 2054   GLU 91   CALCIUM 9.4   ALBUMIN 3.9   PROT 7.2      K 4.0   CO2 22*      BUN 14   CREATININE 0.7   ALKPHOS 49*   ALT 25   AST 23   BILITOT 0.3       Diagnostic Results:  US: Reviewed  Uterus measures 7.1 x 3.6 x 4.7 cm. No intrauterine gestation is identified.     Right ovary measures 2.2 x 2.4 x 2.5 cm.  There is appropriate arterial and venous flow.  Left ovary measures 3.4 x 1.6 x 2.3 cm.  There is appropriate arterial and venous flow.    Failed early pregnancy, with no intrauterine gestational sac identifiable.    Assessment/Plan:     * Ectopic pregnancy    - presumed ectopic pregnancy with no IUP seen on ultrasound  - Beta HCG trending inappropriately  241>485>661>941>1032  - VSS  - CBC H/H 14/42, no WBC  - CMP WNL  - non acute abdomen  - Can be safely treated without surgery at this time   - discussed at lengths risk of untreated ectopic pregnancy  - After consent and risk associations, MTX injection given  - patient to f/u on day 4 and day 7 for beta-hcg lab level   - MTX precaution/side effects discussed, patient understand  - ED precautions given  - patient added to beta-hcg book  - beta level order  plaaced             Thank you for your consult. I will sign off. Please contact us if you have any additional questions.    Lydia Merchant MD  Obstetrics & Gynecology  [facility]

## 2017-12-31 NOTE — SUBJECTIVE & OBJECTIVE
Obstetric History       T1      L1     SAB1   TAB0   Ectopic0   Multiple0   Live Births1       # Outcome Date GA Lbr Edwin/2nd Weight Sex Delivery Anes PTL Lv   2 Term 16 39w3d  3.26 kg (7 lb 3 oz) F CS-LTranv EPI N JEFFERSON      Complications: Failure to Progress in First Stage      Apgar1:  9                Apgar5: 9   1 SAB 2015 4w0d               Past Medical History:   Diagnosis Date    Allergy     Chronic kidney disease     born with kidney disease been under control since age 12    Keloid cicatrix     Lipoma of unspecified site 2012     Past Surgical History:   Procedure Laterality Date     SECTION      COLONOSCOPY N/A 2016    Procedure: COLONOSCOPY - Miralax split prep;  Surgeon: Jose Antonio Jr., MD;  Location: [facility];  Service: Endoscopy;  Laterality: N/A;    LIPOMA RESECTION      right buttocks    TENDON REPAIR      bilateral feet    TONSILLECTOMY           (Not in a hospital admission)    Review of patient's allergies indicates:   Allergen Reactions    No known drug allergies         Family History     Problem Relation (Age of Onset)    Breast cancer Mother (35)    Cancer Mother, Maternal Grandmother    Diabetes Paternal Grandfather        Social History Main Topics    Smoking status: Never Smoker    Smokeless tobacco: Never Used    Alcohol use No      Comment: occasional    Drug use: No    Sexual activity: Yes     Partners: Male     Birth control/ protection: None     Review of Systems   Constitutional: Negative for fever.   Respiratory: Negative for cough and shortness of breath.    Cardiovascular: Negative for leg swelling.   Gastrointestinal: Positive for abdominal pain. Negative for vomiting.   Genitourinary: Positive for vaginal bleeding. Negative for vaginal discharge.   Neurological: Negative for headaches.   Hematological: Does not bruise/bleed easily.   All other systems reviewed and are negative.     Objective:     Vital Signs  (Most Recent):  Temp: 98 °F (36.7 °C) (12/28/17 2200)  Pulse: 76 (12/28/17 2200)  Resp: 18 (12/28/17 2200)  BP: 110/69 (12/28/17 2200)  SpO2: 100 % (12/28/17 2200) Vital Signs (24h Range):        Weight: 70.3 kg (155 lb)  Body mass index is 27.46 kg/m².    No LMP recorded.    Physical Exam:   Constitutional: She is oriented to person, place, and time. She appears well-developed and well-nourished. No distress.    HENT:   Head: Normocephalic and atraumatic.      Cardiovascular: Normal rate, regular rhythm and normal heart sounds.     Pulmonary/Chest: Effort normal and breath sounds normal.        Abdominal: Soft. Bowel sounds are normal. She exhibits no distension. There is tenderness.   Slight RLQ tenderness to palpation      Genitourinary: Right adnexum displays no mass and no tenderness. Left adnexum displays no mass and no tenderness.       Genitourinary Comments: No adnexal tenderness on bimanual exam         Uterus Size: 6 cm   Musculoskeletal: Normal range of motion. She exhibits no edema.       Neurological: She is alert and oriented to person, place, and time.    Skin: Skin is warm and dry.    Psychiatric: She has a normal mood and affect.       Laboratory:  Beta HCG: No results for input(s): HCGPREGUR in the last 48 hours.  CBC:   Recent Labs  Lab 12/28/17 2016   WBC 10.44   RBC 4.97   HGB 14.5   HCT 42.3      MCV 85   MCH 29.2   MCHC 34.3     CMP:   Recent Labs  Lab 12/28/17 2054   GLU 91   CALCIUM 9.4   ALBUMIN 3.9   PROT 7.2      K 4.0   CO2 22*      BUN 14   CREATININE 0.7   ALKPHOS 49*   ALT 25   AST 23   BILITOT 0.3       Diagnostic Results:  US: Reviewed  Uterus measures 7.1 x 3.6 x 4.7 cm. No intrauterine gestation is identified.     Right ovary measures 2.2 x 2.4 x 2.5 cm.  There is appropriate arterial and venous flow.  Left ovary measures 3.4 x 1.6 x 2.3 cm.  There is appropriate arterial and venous flow.    Failed early pregnancy, with no intrauterine gestational sac  identifiable.

## 2018-01-01 ENCOUNTER — HOSPITAL ENCOUNTER (EMERGENCY)
Facility: OTHER | Age: 33
Discharge: HOME | End: 2018-01-01
Attending: EMERGENCY MEDICINE
Payer: COMMERCIAL

## 2018-01-01 VITALS
OXYGEN SATURATION: 100 % | HEIGHT: 63 IN | HEART RATE: 69 BPM | RESPIRATION RATE: 18 BRPM | BODY MASS INDEX: 27.46 KG/M2 | SYSTOLIC BLOOD PRESSURE: 129 MMHG | WEIGHT: 155 LBS | DIASTOLIC BLOOD PRESSURE: 69 MMHG | TEMPERATURE: 98 F

## 2018-01-01 DIAGNOSIS — O00.90 ECTOPIC PREGNANCY, UNSPECIFIED LOCATION, UNSPECIFIED WHETHER INTRAUTERINE PREGNANCY PRESENT: Primary | ICD-10-CM

## 2018-01-01 LAB — HCG INTACT+B SERPL-ACNC: 1043 MIU/ML

## 2018-01-01 PROCEDURE — 99284 EMERGENCY DEPT VISIT MOD MDM: CPT

## 2018-01-01 PROCEDURE — 84702 CHORIONIC GONADOTROPIN TEST: CPT

## 2018-01-01 NOTE — ED PROVIDER NOTES
"Encounter Date: 2018       History     Chief Complaint   Patient presents with    Labs Only     + repeat HCG level due today.      32-year-old female who is  with reported ectopic pregnancy presents emergency department for repeat beta-hCG.  She states that she was seen on Thursday May administered methotrexate.  She reports some vaginal spotting denies significant bleeding or pain.  She denies fever, chills.  She does report nausea and vomiting after being seen here Thursday what she states is since subsided.  She reports that she has an 18 month old at home and also states that she's had a previous "early detection positive pregnancy test but then started her menstrual cycle so I'm not sure that counts as a pregnancy or not."      The history is provided by the patient.     Review of patient's allergies indicates:   Allergen Reactions    No known drug allergies      Past Medical History:   Diagnosis Date    Allergy     Chronic kidney disease     born with kidney disease been under control since age 12    Keloid cicatrix     Lipoma of unspecified site 2012     Past Surgical History:   Procedure Laterality Date     SECTION      COLONOSCOPY N/A 2016    Procedure: COLONOSCOPY - Miralax split prep;  Surgeon: Jose Antonio Jr., MD;  Location: [facility];  Service: Endoscopy;  Laterality: N/A;    LIPOMA RESECTION      right buttocks    TENDON REPAIR      bilateral feet    TONSILLECTOMY       Family History   Problem Relation Age of Onset    Cancer Mother      breast    Breast cancer Mother 35     , negative genetic testing    Cancer Maternal Grandmother      throat    Diabetes Paternal Grandfather     Melanoma Neg Hx     Colon cancer Neg Hx     Ovarian cancer Neg Hx      Social History   Substance Use Topics    Smoking status: Never Smoker    Smokeless tobacco: Never Used    Alcohol use No      Comment: occasional     Review of Systems   Constitutional: Negative for " chills and fever.   HENT: Negative for sore throat.    Respiratory: Negative for shortness of breath.    Cardiovascular: Negative for chest pain.   Gastrointestinal: Negative for abdominal pain, diarrhea, nausea and vomiting.   Genitourinary: Positive for vaginal bleeding. Negative for difficulty urinating, dysuria, flank pain, frequency, hematuria, urgency and vaginal discharge.   Musculoskeletal: Negative for back pain.   Skin: Negative for rash.   Neurological: Negative for weakness.   Hematological: Does not bruise/bleed easily.       Physical Exam     Initial Vitals [01/01/18 1329]   BP Pulse Resp Temp SpO2   118/75 73 16 97.6 °F (36.4 °C) 100 %      MAP       89.33         Physical Exam    Nursing note and vitals reviewed.  Constitutional: Vital signs are normal. She appears well-developed and well-nourished. She is not diaphoretic.  Non-toxic appearance. No distress.   HENT:   Head: Normocephalic and atraumatic.   Right Ear: External ear normal.   Left Ear: External ear normal.   Nose: Nose normal.   Eyes: Conjunctivae and lids are normal. No scleral icterus.   Neck: Normal range of motion and phonation normal. Neck supple.   Abdominal: Normal appearance.   Musculoskeletal: Normal range of motion.   No obvious deformities, moving all extremities, normal gait   Neurological: She is alert and oriented to person, place, and time. No sensory deficit.   Skin: Skin is warm, dry and intact. No lesion and no rash noted. No erythema.   Psychiatric: She has a normal mood and affect. Her speech is normal and behavior is normal. Judgment normal. Cognition and memory are normal.         ED Course   Procedures  Labs Reviewed   HCG, QUANTITATIVE, PREGNANCY             Medical Decision Making:   History:   Old Medical Records: I decided to obtain old medical records.  Initial Assessment:   32-year-old female with complaints consistent with ectopic pregnancy here for follow-up beta-hCG.  Vital signs stable, afebrile,  neurovascularly intact.  She is alert, healthy and nontoxic appearing.  She is in no apparent distress.  Exam benign.  Patient was seen here on Thursday and diagnosed ectopic pregnancy.  She was administered methotrexate with a beta-hCG around 1020s.  Clinical Tests:   Lab Tests: Ordered and Reviewed  ED Management:  Beta hCG obtained.  It is 1043.  Minimal increase from previous obtained on Thursday.  2:11 PM discussed with GYN, Dr Alcantara. Will review and call back with plan.  Patient informed of results.  She is urged to follow-up in 3 days for repeat beta-hCG is recommended by OB/GYN.  She states that she also has an appointment on Thursday with her OB/GYN.  She is advised to obtain blood work prior to going to her OB/GYN appointment.  She was given strict return precautions including heavy bleeding, greater than 1 pad an hour, severe pain, fever, nausea, vomiting, etc.  She states understanding.  This patient was discussed with the attending physician who agrees with treatment plan.  Other:   I have discussed this case with another health care provider.       <> Summary of the Discussion: Duy  This note was created using Dragon Medical dictation.  There may be typographical errors secondary to dictation.                     ED Course      Clinical Impression:     1. Ectopic pregnancy, unspecified location, unspecified whether intrauterine pregnancy present          Disposition:   Disposition: Discharged  Condition: Stable                        Su Shirley PA-C  01/01/18 4932

## 2018-01-01 NOTE — ED NOTES
Pt to ED for follow up HCG level as instructed. Pt denies any complaints at this time. Pt AAOx4 and appropriate at this time. Respirations even and unlabored. No acute distress noted.

## 2018-01-02 NOTE — TELEPHONE ENCOUNTER
Left message with the patient. NA  Discussed that the level may rise right after MTx but the BHCG levels should decreased by 15 % from day 4 to day 7. Please have patient have the day 7 bhcg and then follow up with me in the office after. BLeeding and pain precautions.

## 2018-01-04 ENCOUNTER — OFFICE VISIT (OUTPATIENT)
Dept: OBSTETRICS AND GYNECOLOGY | Facility: CLINIC | Age: 33
End: 2018-01-04
Payer: COMMERCIAL

## 2018-01-04 ENCOUNTER — LAB VISIT (OUTPATIENT)
Dept: LAB | Facility: OTHER | Age: 33
End: 2018-01-04
Payer: COMMERCIAL

## 2018-01-04 ENCOUNTER — HOSPITAL ENCOUNTER (EMERGENCY)
Facility: OTHER | Age: 33
Discharge: HOME | End: 2018-01-04
Attending: EMERGENCY MEDICINE
Payer: COMMERCIAL

## 2018-01-04 VITALS
WEIGHT: 170.19 LBS | SYSTOLIC BLOOD PRESSURE: 112 MMHG | DIASTOLIC BLOOD PRESSURE: 70 MMHG | BODY MASS INDEX: 30.16 KG/M2 | HEIGHT: 63 IN

## 2018-01-04 VITALS
HEIGHT: 63 IN | RESPIRATION RATE: 18 BRPM | DIASTOLIC BLOOD PRESSURE: 57 MMHG | OXYGEN SATURATION: 97 % | BODY MASS INDEX: 28.35 KG/M2 | HEART RATE: 56 BPM | TEMPERATURE: 98 F | WEIGHT: 160 LBS | SYSTOLIC BLOOD PRESSURE: 100 MMHG

## 2018-01-04 DIAGNOSIS — Z79.899 ENCOUNTER FOR MEDICATION MANAGEMENT: Primary | ICD-10-CM

## 2018-01-04 DIAGNOSIS — O00.90 ECTOPIC PREGNANCY, UNSPECIFIED LOCATION, UNSPECIFIED WHETHER INTRAUTERINE PREGNANCY PRESENT: Primary | ICD-10-CM

## 2018-01-04 DIAGNOSIS — O00.90 ECTOPIC PREGNANCY, UNSPECIFIED LOCATION, UNSPECIFIED WHETHER INTRAUTERINE PREGNANCY PRESENT: ICD-10-CM

## 2018-01-04 LAB
ALBUMIN SERPL BCP-MCNC: 4.1 G/DL
ALP SERPL-CCNC: 49 U/L
ALT SERPL W/O P-5'-P-CCNC: 23 U/L
ANION GAP SERPL CALC-SCNC: 8 MMOL/L
AST SERPL-CCNC: 18 U/L
BILIRUB SERPL-MCNC: 0.8 MG/DL
BUN SERPL-MCNC: 14 MG/DL
CALCIUM SERPL-MCNC: 9.1 MG/DL
CHLORIDE SERPL-SCNC: 105 MMOL/L
CO2 SERPL-SCNC: 25 MMOL/L
CREAT SERPL-MCNC: 0.8 MG/DL
EST. GFR  (AFRICAN AMERICAN): >60 ML/MIN/1.73 M^2
EST. GFR  (NON AFRICAN AMERICAN): >60 ML/MIN/1.73 M^2
GLUCOSE SERPL-MCNC: 81 MG/DL
HCG INTACT+B SERPL-ACNC: 948 MIU/ML
POTASSIUM SERPL-SCNC: 4.2 MMOL/L
PROT SERPL-MCNC: 7.4 G/DL
SODIUM SERPL-SCNC: 138 MMOL/L

## 2018-01-04 PROCEDURE — 99999 PR PBB SHADOW E&M-EST. PATIENT-LVL III: CPT | Mod: PBBFAC,,, | Performed by: OBSTETRICS & GYNECOLOGY

## 2018-01-04 PROCEDURE — 96372 THER/PROPH/DIAG INJ SC/IM: CPT

## 2018-01-04 PROCEDURE — 99214 OFFICE O/P EST MOD 30 MIN: CPT | Mod: S$GLB,,, | Performed by: OBSTETRICS & GYNECOLOGY

## 2018-01-04 PROCEDURE — 84702 CHORIONIC GONADOTROPIN TEST: CPT

## 2018-01-04 PROCEDURE — 99283 EMERGENCY DEPT VISIT LOW MDM: CPT | Mod: 25

## 2018-01-04 PROCEDURE — 36415 COLL VENOUS BLD VENIPUNCTURE: CPT

## 2018-01-04 PROCEDURE — 80053 COMPREHEN METABOLIC PANEL: CPT

## 2018-01-04 PROCEDURE — 63600175 PHARM REV CODE 636 W HCPCS: Performed by: EMERGENCY MEDICINE

## 2018-01-04 RX ORDER — METHOTREXATE 25 MG/ML
50 INJECTION INTRA-ARTERIAL; INTRAMUSCULAR; INTRATHECAL; INTRAVENOUS
Status: COMPLETED | OUTPATIENT
Start: 2018-01-04 | End: 2018-01-04

## 2018-01-04 RX ADMIN — METHOTREXATE 90 MG: 25 INJECTION, SOLUTION INTRA-ARTERIAL; INTRAMUSCULAR; INTRATHECAL; INTRAVENOUS at 10:01

## 2018-01-04 NOTE — PROGRESS NOTES
"CC: F/U after MTX for an ectopic on 17    Charis Wells is a 32 y.o. female  presents for a follow up for an ectopic.  She was diagnosed with ectopic and given MTX on 17  US showed no GS or CRL  BHCG on day 4 was 1043, today it Is 948.  She denies any pelvic pain. She has bleeding on and off      Past Medical History:   Diagnosis Date    Allergy     Chronic kidney disease     born with kidney disease been under control since age 12    Keloid cicatrix     Lipoma of unspecified site 2012       Past Surgical History:   Procedure Laterality Date     SECTION      COLONOSCOPY N/A 2016    Procedure: COLONOSCOPY - Miralax split prep;  Surgeon: Jose Antonio Jr., MD;  Location: [facility];  Service: Endoscopy;  Laterality: N/A;    LIPOMA RESECTION      right buttocks    TENDON REPAIR      bilateral feet    TONSILLECTOMY         OB History    Para Term  AB Living   2 1 1   1 1   SAB TAB Ectopic Multiple Live Births   1     0 1      # Outcome Date GA Lbr Edwin/2nd Weight Sex Delivery Anes PTL Lv   2 Term 16 39w3d  3.26 kg (7 lb 3 oz) F CS-LTranv EPI N JEFFERSON      Complications: Failure to Progress in First Stage   1 SAB 2015 4w0d                 Family History   Problem Relation Age of Onset    Cancer Mother      breast    Breast cancer Mother 35     , negative genetic testing    Cancer Maternal Grandmother      throat    Diabetes Paternal Grandfather     Melanoma Neg Hx     Colon cancer Neg Hx     Ovarian cancer Neg Hx        Social History   Substance Use Topics    Smoking status: Never Smoker    Smokeless tobacco: Never Used    Alcohol use No      Comment: occasional       /70   Ht 5' 3" (1.6 m)   Wt 77.2 kg (170 lb 3.1 oz)   LMP  (LMP Unknown)   BMI 30.15 kg/m²     ROS:  GENERAL: Denies weight gain or weight loss. Feeling well overall.   SKIN: Denies rash or lesions.   HEAD: Denies head injury or headache.   NODES: Denies " enlarged lymph nodes.   CHEST: Denies chest pain or shortness of breath.   CARDIOVASCULAR: Denies palpitations or left sided chest pain.   ABDOMEN: No abdominal pain, constipation, diarrhea, nausea, vomiting or rectal bleeding.   URINARY: No frequency, dysuria, hematuria, or burning on urination.  REPRODUCTIVE: See HPI.   BREASTS: The patient performs breast self-examination and denies pain, lumps, or nipple discharge.   HEMATOLOGIC: No easy bruisability or excessive bleeding.  MUSCULOSKELETAL: Denies joint pain or swelling.   NEUROLOGIC: Denies syncope or weakness.   PSYCHIATRIC: Denies depression, anxiety or mood swings.    Physical Exam:    APPEARANCE: Well nourished, well developed, in no acute distress.  AFFECT: WNL, alert and oriented x 3  SKIN: No acne or hirsutism  NECK: Neck symmetric without masses or thyromegaly  NODES: No inguinal, cervical, axillary, or femoral lymph node enlargement  CHEST: Good respiratory effect  ABDOMEN: Soft.  No tenderness or masses.  No hepatosplenomegaly.  No hernias.  PELVIC: Normal external genitalia without lesions.  Normal hair distribution.  Adequate perineal body, normal urethral meatus.  Vagina moist and well rugated without lesions or discharge.  Cervix pink, without lesions, discharge or tenderness.  No significant cystocele or rectocele.  Bimanual exam shows uterus to be normal size, regular, mobile and nontender.  Adnexa without masses or tenderness.    EXTREMITIES: No edema.    ASSESSMENT AND PLAN  1. Ectopic pregnancy, unspecified location, unspecified whether intrauterine pregnancy present         Patient was counseled today on  MTX and dosing and future pregnancies  She did not decrease her BHCG by 15 % from day 4 to 7.  I recommend for her to have repeat MTx dose.   Will go to ER for second dose.   BLeeding and pain precautions  F/U with me after trending BHCG and for sx.

## 2018-01-05 NOTE — ED PROVIDER NOTES
"Encounter Date: 2018    SCRIBE #1 NOTE: I, Hemalatha Peters , am scribing for, and in the presence of, Dr. Ortega.       History     Chief Complaint   Patient presents with    methotrexate injection     "Im here for my 2nd dose of methotrexate     Time seen by provider: 9:44 PM    This is a 32 y.o. female who presents for a methotrexate injection. The patient was seen by Dr. Mackenzie in clinic today, and instructed to go to the ED for an additional dose of methotrexate. She reports vaginal bleeding that has become progressively better, but denies fever, chills, nausea, vomiting, abdominal pain, myalgias, or any urinary symptoms.       The history is provided by the patient.     Review of patient's allergies indicates:   Allergen Reactions    No known drug allergies      Past Medical History:   Diagnosis Date    Allergy     Chronic kidney disease     born with kidney disease been under control since age 12    Keloid cicatrix     Lipoma of unspecified site 2012     Past Surgical History:   Procedure Laterality Date     SECTION      COLONOSCOPY N/A 2016    Procedure: COLONOSCOPY - Miralax split prep;  Surgeon: Jose Antonio Jr., MD;  Location: [facility];  Service: Endoscopy;  Laterality: N/A;    LIPOMA RESECTION      right buttocks    TENDON REPAIR      bilateral feet    TONSILLECTOMY       Family History   Problem Relation Age of Onset    Cancer Mother      breast    Breast cancer Mother 35     , negative genetic testing    Cancer Maternal Grandmother      throat    Diabetes Paternal Grandfather     Melanoma Neg Hx     Colon cancer Neg Hx     Ovarian cancer Neg Hx      Social History   Substance Use Topics    Smoking status: Never Smoker    Smokeless tobacco: Never Used    Alcohol use No      Comment: occasional     Review of Systems   Constitutional: Negative for activity change, appetite change, chills, diaphoresis and fever.   HENT: Negative for congestion, sore throat " and trouble swallowing.    Eyes: Negative for photophobia and visual disturbance.   Respiratory: Negative for cough, chest tightness and shortness of breath.    Cardiovascular: Negative for chest pain.   Gastrointestinal: Negative for abdominal pain, nausea and vomiting.   Endocrine: Negative for polydipsia and polyuria.   Genitourinary: Positive for vaginal bleeding. Negative for difficulty urinating, dysuria, flank pain, frequency, hematuria and urgency.   Musculoskeletal: Negative for back pain, myalgias and neck pain.   Skin: Negative for rash.   Neurological: Negative for weakness and headaches.   Psychiatric/Behavioral: Negative for confusion.       Physical Exam     Initial Vitals [01/04/18 2116]   BP Pulse Resp Temp SpO2   108/61 61 18 97.5 °F (36.4 °C) 98 %      MAP       76.67         Physical Exam    Constitutional: She appears well-developed and well-nourished. She is not diaphoretic. No distress.   HENT:   Head: Normocephalic and atraumatic.   Eyes: EOM are normal. Pupils are equal, round, and reactive to light.   Neck: Normal range of motion.   Cardiovascular: Normal rate, regular rhythm and normal heart sounds.   Pulmonary/Chest: Breath sounds normal. No respiratory distress.   Neurological: She is alert and oriented to person, place, and time.   Psychiatric: She has a normal mood and affect. Her behavior is normal. Judgment and thought content normal.         ED Course   Procedures  Labs Reviewed - No data to display             Additional MDM:   Comments: 33 y/o female presented here at the instruction of her OB for her 2nd dose of methotrexate.  Medication will be administered and patient will be d/c'ed home to f/u with her OB as previously scheduled.  .          Scribe Attestation:   Scribe #1: I performed the above scribed service and the documentation accurately describes the services I performed. I attest to the accuracy of the note.    Attending Attestation:           Physician Attestation for  Scribe:  Physician Attestation Statement for Scribe #1: I, Dr. Ortega, reviewed documentation, as scribed by Hemalatha Peters  in my presence, and it is both accurate and complete.                 ED Course      Clinical Impression:     1. Encounter for medication management                                 Giselle Ortega MD  01/04/18 9053

## 2018-01-08 ENCOUNTER — LAB VISIT (OUTPATIENT)
Dept: LAB | Facility: OTHER | Age: 33
End: 2018-01-08
Payer: COMMERCIAL

## 2018-01-08 DIAGNOSIS — O00.90 ECTOPIC PREGNANCY, UNSPECIFIED LOCATION, UNSPECIFIED WHETHER INTRAUTERINE PREGNANCY PRESENT: ICD-10-CM

## 2018-01-08 LAB — HCG INTACT+B SERPL-ACNC: 648 MIU/ML

## 2018-01-08 PROCEDURE — 84702 CHORIONIC GONADOTROPIN TEST: CPT

## 2018-01-08 PROCEDURE — 36415 COLL VENOUS BLD VENIPUNCTURE: CPT

## 2018-01-10 ENCOUNTER — PATIENT MESSAGE (OUTPATIENT)
Dept: OBSTETRICS AND GYNECOLOGY | Facility: CLINIC | Age: 33
End: 2018-01-10

## 2018-01-10 ENCOUNTER — TELEPHONE (OUTPATIENT)
Dept: OBSTETRICS AND GYNECOLOGY | Facility: CLINIC | Age: 33
End: 2018-01-10

## 2018-01-10 NOTE — TELEPHONE ENCOUNTER
Your BHCG is down to 648   Please repeat another bhcg in two days! This is trending down nicely!  RUFINA Mackenzie

## 2018-01-11 ENCOUNTER — LAB VISIT (OUTPATIENT)
Dept: LAB | Facility: OTHER | Age: 33
End: 2018-01-11
Payer: COMMERCIAL

## 2018-01-11 DIAGNOSIS — O00.90 ECTOPIC PREGNANCY, UNSPECIFIED LOCATION, UNSPECIFIED WHETHER INTRAUTERINE PREGNANCY PRESENT: ICD-10-CM

## 2018-01-11 LAB — HCG INTACT+B SERPL-ACNC: 502 MIU/ML

## 2018-01-11 PROCEDURE — 84702 CHORIONIC GONADOTROPIN TEST: CPT

## 2018-01-11 PROCEDURE — 36415 COLL VENOUS BLD VENIPUNCTURE: CPT

## 2018-01-16 ENCOUNTER — TELEPHONE (OUTPATIENT)
Dept: OBSTETRICS AND GYNECOLOGY | Facility: CLINIC | Age: 33
End: 2018-01-16

## 2018-01-16 DIAGNOSIS — O00.90 ECTOPIC PREGNANCY, UNSPECIFIED LOCATION, UNSPECIFIED WHETHER INTRAUTERINE PREGNANCY PRESENT: Primary | ICD-10-CM

## 2018-01-16 NOTE — TELEPHONE ENCOUNTER
----- Message from Aster Rothman sent at 1/16/2018  3:28 PM CST -----  Contact: pt  x_ 1st Request  _ 2nd Request  _ 3rd Request    Who: pt    Why: is returning a call    What Number to Call Back: [phone]    When to Expect a call back: (Before the end of the day)  -- if call after 3:00 call back will be tomorrow.

## 2018-01-17 ENCOUNTER — PATIENT MESSAGE (OUTPATIENT)
Dept: OBSTETRICS AND GYNECOLOGY | Facility: CLINIC | Age: 33
End: 2018-01-17

## 2018-01-18 ENCOUNTER — LAB VISIT (OUTPATIENT)
Dept: LAB | Facility: OTHER | Age: 33
End: 2018-01-18
Attending: OBSTETRICS & GYNECOLOGY
Payer: COMMERCIAL

## 2018-01-18 DIAGNOSIS — O00.90 ECTOPIC PREGNANCY, UNSPECIFIED LOCATION, UNSPECIFIED WHETHER INTRAUTERINE PREGNANCY PRESENT: ICD-10-CM

## 2018-01-18 LAB — HCG INTACT+B SERPL-ACNC: 130 MIU/ML

## 2018-01-18 PROCEDURE — 36415 COLL VENOUS BLD VENIPUNCTURE: CPT

## 2018-01-18 PROCEDURE — 84702 CHORIONIC GONADOTROPIN TEST: CPT

## 2018-01-22 ENCOUNTER — TELEPHONE (OUTPATIENT)
Dept: OBSTETRICS AND GYNECOLOGY | Facility: HOSPITAL | Age: 33
End: 2018-01-22

## 2018-01-22 DIAGNOSIS — O00.90 ECTOPIC PREGNANCY, UNSPECIFIED LOCATION, UNSPECIFIED WHETHER INTRAUTERINE PREGNANCY PRESENT: Primary | ICD-10-CM

## 2018-01-22 NOTE — TELEPHONE ENCOUNTER
Called to discuss hCG value.   1/18/18 beta hCG 130, decreased from 502 11 days ago.  She will follow up with lab work 1 week from last.     Robin Guevara M.D.  Obstetrics & Gynecology  PGY-1

## 2018-01-25 ENCOUNTER — LAB VISIT (OUTPATIENT)
Dept: LAB | Facility: OTHER | Age: 33
End: 2018-01-25
Payer: COMMERCIAL

## 2018-01-25 DIAGNOSIS — O00.90 ECTOPIC PREGNANCY, UNSPECIFIED LOCATION, UNSPECIFIED WHETHER INTRAUTERINE PREGNANCY PRESENT: ICD-10-CM

## 2018-01-25 LAB — HCG INTACT+B SERPL-ACNC: 5.7 MIU/ML

## 2018-01-25 PROCEDURE — 36415 COLL VENOUS BLD VENIPUNCTURE: CPT

## 2018-01-25 PROCEDURE — 84702 CHORIONIC GONADOTROPIN TEST: CPT

## 2018-02-08 ENCOUNTER — TELEPHONE (OUTPATIENT)
Dept: OBSTETRICS AND GYNECOLOGY | Facility: HOSPITAL | Age: 33
End: 2018-02-08

## 2018-02-08 NOTE — TELEPHONE ENCOUNTER
Charis Wells contacted to discuss her most recent beta-hCG level.   On 1/25/18 beta-hCG was 5.9, which is decreased from the previous value of 130.  Left voicemail regarding the importance of following this pregnancy hormone until it is <5 with weekly lab draws. Although this value is nearly acceptable would technically like to have <5.   Advised that I will call with lab value if she has it repeated.    Robin Guevara M.D.  Obstetrics & Gynecology  PGY-1

## 2018-03-12 ENCOUNTER — TELEPHONE (OUTPATIENT)
Dept: GYNECOLOGY | Facility: OTHER | Age: 33
End: 2018-03-12

## 2018-03-12 NOTE — TELEPHONE ENCOUNTER
Charis Mandeepsue Russell contacted to discuss her most recent beta-hCG level.   On 1/25/18 beta-hCG was 5.9, which is decreased from the previous value of 130.  Again left a voicemail regarding the importance of following this pregnancy hormone until it is <5 with weekly lab draws.   Advised that I will call with lab value if she has it repeated.    Esdras Verduzco M.D.  PGY1 OB/GYN

## 2018-03-13 ENCOUNTER — LAB VISIT (OUTPATIENT)
Dept: LAB | Facility: OTHER | Age: 33
End: 2018-03-13
Payer: COMMERCIAL

## 2018-03-13 DIAGNOSIS — O00.90 ECTOPIC PREGNANCY, UNSPECIFIED LOCATION, UNSPECIFIED WHETHER INTRAUTERINE PREGNANCY PRESENT: ICD-10-CM

## 2018-03-13 LAB — HCG INTACT+B SERPL-ACNC: <1.2 MIU/ML

## 2018-03-13 PROCEDURE — 36415 COLL VENOUS BLD VENIPUNCTURE: CPT

## 2018-03-13 PROCEDURE — 84702 CHORIONIC GONADOTROPIN TEST: CPT

## 2018-03-15 ENCOUNTER — PATIENT MESSAGE (OUTPATIENT)
Dept: OBSTETRICS AND GYNECOLOGY | Facility: CLINIC | Age: 33
End: 2018-03-15

## 2018-03-16 ENCOUNTER — TELEPHONE (OUTPATIENT)
Dept: GYNECOLOGY | Facility: OTHER | Age: 33
End: 2018-03-16

## 2018-03-16 NOTE — TELEPHONE ENCOUNTER
Attempted to reach Charis Wells regarding her most recent beta-hCG level. No answer, left a message.   On 3/13 her B-HCG resulted at <1.2, down from 5.9 on 1/25/18.  Expressed that no further follow-up was needed at this time.  Will remove from the BETA BOOK.      Esdras Verduzco M.D.  PGY1 OB/GYN

## 2018-04-23 ENCOUNTER — PATIENT MESSAGE (OUTPATIENT)
Dept: OBSTETRICS AND GYNECOLOGY | Facility: CLINIC | Age: 33
End: 2018-04-23

## 2018-06-01 ENCOUNTER — OFFICE VISIT (OUTPATIENT)
Dept: OBSTETRICS AND GYNECOLOGY | Facility: CLINIC | Age: 33
End: 2018-06-01
Payer: COMMERCIAL

## 2018-06-01 VITALS
SYSTOLIC BLOOD PRESSURE: 120 MMHG | DIASTOLIC BLOOD PRESSURE: 80 MMHG | WEIGHT: 162.69 LBS | HEIGHT: 63 IN | BODY MASS INDEX: 28.82 KG/M2

## 2018-06-01 DIAGNOSIS — R53.83 FATIGUE, UNSPECIFIED TYPE: ICD-10-CM

## 2018-06-01 DIAGNOSIS — Z01.419 WOMEN'S ANNUAL ROUTINE GYNECOLOGICAL EXAMINATION: Primary | ICD-10-CM

## 2018-06-01 DIAGNOSIS — F41.9 ANXIETY: ICD-10-CM

## 2018-06-01 PROCEDURE — 99395 PREV VISIT EST AGE 18-39: CPT | Mod: S$GLB,,, | Performed by: OBSTETRICS & GYNECOLOGY

## 2018-06-01 PROCEDURE — 88175 CYTOPATH C/V AUTO FLUID REDO: CPT

## 2018-06-01 PROCEDURE — 99999 PR PBB SHADOW E&M-EST. PATIENT-LVL III: CPT | Mod: PBBFAC,,, | Performed by: OBSTETRICS & GYNECOLOGY

## 2018-06-01 NOTE — PROGRESS NOTES
"CC: Well woman exam    Charis Wells is a 32 y.o. female  presents for a well woman exam.  She has significant anxiety over  Her prior ectopic in .  She had a negative BHCG on 3/13/18.  She is sad she is not pregnant now.  She is having normal cycles- about 30 days apart.   She feels down, "not herself", and has hair loss, not sleeping,  And not doing the things she enjoys.  She says " she is at peace with her anxiety"  And accepts that she is this way.   She declines any therapy support, any medical management for anxiety or depression.  She says " it was not supposed to be this way, and my children should not be with far apart in age".         Past Medical History:   Diagnosis Date    Allergy     Chronic kidney disease     born with kidney disease been under control since age 12    Keloid cicatrix     Lipoma of unspecified site 2012       Past Surgical History:   Procedure Laterality Date     SECTION      COLONOSCOPY N/A 2016    Procedure: COLONOSCOPY - Miralax split prep;  Surgeon: Jose Antonio Jr., MD;  Location: [facility];  Service: Endoscopy;  Laterality: N/A;    LIPOMA RESECTION      right buttocks    TENDON REPAIR      bilateral feet    TONSILLECTOMY         OB History    Para Term  AB Living   2 1 1   1 1   SAB TAB Ectopic Multiple Live Births   1     0 1      # Outcome Date GA Lbr Edwin/2nd Weight Sex Delivery Anes PTL Lv   2 Term 16 39w3d  3.26 kg (7 lb 3 oz) F CS-LTranv EPI N JEFFERSON      Complications: Failure to Progress in First Stage   1 SAB 2015 4w0d                 Family History   Problem Relation Age of Onset    Cancer Mother         breast    Breast cancer Mother 35        , negative genetic testing    Cancer Maternal Grandmother         throat    Diabetes Paternal Grandfather     Melanoma Neg Hx     Colon cancer Neg Hx     Ovarian cancer Neg Hx        Social History   Substance Use Topics    Smoking status: Never " "Smoker    Smokeless tobacco: Never Used    Alcohol use No      Comment: occasional       /80   Ht 5' 3" (1.6 m)   Wt 73.8 kg (162 lb 11.2 oz)   LMP 05/25/2018 (Exact Date)   BMI 28.82 kg/m²     ROS:  GENERAL: Denies weight gain or weight loss. Feeling well overall.   SKIN: Denies rash or lesions.   HEAD: Denies head injury or headache.   NODES: Denies enlarged lymph nodes.   CHEST: Denies chest pain or shortness of breath.   CARDIOVASCULAR: Denies palpitations or left sided chest pain.   ABDOMEN: No abdominal pain, constipation, diarrhea, nausea, vomiting or rectal bleeding.   URINARY: No frequency, dysuria, hematuria, or burning on urination.  REPRODUCTIVE: See HPI.   BREASTS: The patient performs breast self-examination and denies pain, lumps, or nipple discharge.   HEMATOLOGIC: No easy bruisability or excessive bleeding.  MUSCULOSKELETAL: Denies joint pain or swelling.   NEUROLOGIC: Denies syncope or weakness.   PSYCHIATRIC: Denies depression, anxiety or mood swings.    Physical Exam:    APPEARANCE: Well nourished, well developed, in no acute distress.  AFFECT: WNL, alert and oriented x 3  SKIN: No acne or hirsutism  NECK: Neck symmetric without masses or thyromegaly  NODES: No inguinal, cervical, axillary, or femoral lymph node enlargement  CHEST: Good respiratory effect  ABDOMEN: Soft.  No tenderness or masses.  No hepatosplenomegaly.  No hernias.  BREASTS: Symmetrical, no skin changes or visible lesions.  No palpable masses, nipple discharge bilaterally.  PELVIC: Normal external genitalia without lesions.  Normal hair distribution.  Adequate perineal body, normal urethral meatus.  Vagina moist and well rugated without lesions or discharge.  Cervix pink, without lesions, discharge or tenderness.  No significant cystocele or rectocele.  Bimanual exam shows uterus to be normal size, regular, mobile and nontender.  Adnexa without masses or tenderness.    EXTREMITIES: No edema.    ASSESSMENT AND " PLAN  1. Women's annual routine gynecological examination  Liquid-based pap smear, screening   2. Fatigue, unspecified type  TSH    Progesterone    Estradiol    Luteinizing hormone    Prolactin   3. Anxiety       Timed relations  Consider progesterone therapy  Recommend appt with psych  Consider infertility management with Dr Andres.  May want to do genetic testing vs AMH check  Behavioral modification    LH kits  PNV daily    Patient was counseled today on A.C.S. Pap guidelines and recommendations for yearly pelvic exams, mammograms and monthly self breast exams; to see her PCP for other health maintenance.     Follow-up if symptoms worsen or fail to improve.

## 2018-06-04 ENCOUNTER — LAB VISIT (OUTPATIENT)
Dept: LAB | Facility: OTHER | Age: 33
End: 2018-06-04
Attending: OBSTETRICS & GYNECOLOGY
Payer: COMMERCIAL

## 2018-06-04 DIAGNOSIS — R53.83 FATIGUE, UNSPECIFIED TYPE: ICD-10-CM

## 2018-06-04 LAB
ESTRADIOL SERPL-MCNC: 168 PG/ML
LH SERPL-ACNC: 4.9 MIU/ML
PROGEST SERPL-MCNC: 0.2 NG/ML
PROLACTIN SERPL IA-MCNC: 7.3 NG/ML
TSH SERPL DL<=0.005 MIU/L-ACNC: 0.86 UIU/ML

## 2018-06-04 PROCEDURE — 84144 ASSAY OF PROGESTERONE: CPT

## 2018-06-04 PROCEDURE — 36415 COLL VENOUS BLD VENIPUNCTURE: CPT

## 2018-06-04 PROCEDURE — 84146 ASSAY OF PROLACTIN: CPT

## 2018-06-04 PROCEDURE — 83002 ASSAY OF GONADOTROPIN (LH): CPT

## 2018-06-04 PROCEDURE — 82670 ASSAY OF TOTAL ESTRADIOL: CPT

## 2018-06-04 PROCEDURE — 84443 ASSAY THYROID STIM HORMONE: CPT

## 2018-08-06 ENCOUNTER — PATIENT MESSAGE (OUTPATIENT)
Dept: OBSTETRICS AND GYNECOLOGY | Facility: CLINIC | Age: 33
End: 2018-08-06

## 2018-10-25 ENCOUNTER — IMMUNIZATION (OUTPATIENT)
Dept: PHARMACY | Facility: CLINIC | Age: 33
End: 2018-10-25
Payer: COMMERCIAL

## 2019-01-30 ENCOUNTER — OFFICE VISIT (OUTPATIENT)
Dept: URGENT CARE | Facility: CLINIC | Age: 34
End: 2019-01-30
Payer: COMMERCIAL

## 2019-01-30 VITALS
RESPIRATION RATE: 18 BRPM | HEART RATE: 93 BPM | OXYGEN SATURATION: 99 % | TEMPERATURE: 99 F | SYSTOLIC BLOOD PRESSURE: 108 MMHG | DIASTOLIC BLOOD PRESSURE: 70 MMHG | BODY MASS INDEX: 29.23 KG/M2 | WEIGHT: 165 LBS | HEIGHT: 63 IN

## 2019-01-30 DIAGNOSIS — R05.9 COUGH: ICD-10-CM

## 2019-01-30 DIAGNOSIS — J11.1 INFLUENZA: Primary | ICD-10-CM

## 2019-01-30 LAB
CTP QC/QA: YES
FLUAV AG NPH QL: POSITIVE
FLUBV AG NPH QL: NEGATIVE

## 2019-01-30 PROCEDURE — 87804 INFLUENZA ASSAY W/OPTIC: CPT | Mod: QW,S$GLB,, | Performed by: PHYSICIAN ASSISTANT

## 2019-01-30 PROCEDURE — 99214 PR OFFICE/OUTPT VISIT, EST, LEVL IV, 30-39 MIN: ICD-10-PCS | Mod: S$GLB,,, | Performed by: PHYSICIAN ASSISTANT

## 2019-01-30 PROCEDURE — 99214 OFFICE O/P EST MOD 30 MIN: CPT | Mod: S$GLB,,, | Performed by: PHYSICIAN ASSISTANT

## 2019-01-30 PROCEDURE — 87804 POCT INFLUENZA A/B: ICD-10-PCS | Mod: QW,S$GLB,, | Performed by: PHYSICIAN ASSISTANT

## 2019-01-30 RX ORDER — CETRORELIX ACETATE 0.25 MG
KIT SUBCUTANEOUS
Refills: 2 | COMMUNITY
Start: 2019-01-15 | End: 2019-05-29

## 2019-01-30 RX ORDER — FOLLITROPIN 900 [IU]/1.5ML
INJECTION, SOLUTION SUBCUTANEOUS
Refills: 2 | COMMUNITY
Start: 2019-01-15 | End: 2019-05-29

## 2019-01-30 RX ORDER — GONADOTROPHIN, CHORIONIC 5000 UNIT
KIT INTRAMUSCULAR
Refills: 0 | COMMUNITY
Start: 2019-01-15 | End: 2019-05-29

## 2019-01-30 RX ORDER — MENOTROPINS 75 UNIT
KIT SUBCUTANEOUS
Refills: 2 | COMMUNITY
Start: 2019-01-15 | End: 2019-05-29

## 2019-01-30 RX ORDER — NORETHINDRONE AND ETHINYL ESTRADIOL 1 MG-35MCG
KIT ORAL
Refills: 1 | COMMUNITY
Start: 2018-12-26 | End: 2019-05-29

## 2019-01-30 NOTE — PROGRESS NOTES
"Subjective:       Patient ID: Charis Wells is a 33 y.o. female.    Vitals:  height is 5' 3" (1.6 m) and weight is 74.8 kg (165 lb). Her temperature is 98.8 °F (37.1 °C). Her blood pressure is 108/70 and her pulse is 93. Her respiration is 18 and oxygen saturation is 99%.     Chief Complaint: URI    This is a 33 y.o. female   with Past Medical History:  No date: Allergy  No date: Chronic kidney disease      Comment:  born with kidney disease been under control since age 12  No date: Keloid cicatrix  2012: Lipoma of unspecified site   and Past Surgical History:  No date:  SECTION  2016: COLONOSCOPY - Miralax split prep; N/A      Comment:  Performed by Jose Antonio Jr., MD at [facility] ENDO  2016: DELIVERY-CEASAREAN SECTION; N/A      Comment:  Performed by Lizzie Mackenzie MD at [facility] L&D  No date: LIPOMA RESECTION      Comment:  right buttocks  No date: TENDON REPAIR      Comment:  bilateral feet  2005: TONSILLECTOMY  who presents today with a chief complaint of sinus congestion, body aches, fever, and sneezing.  Patient states her symptoms began yesterday.  She is in the middle of IVF therapy and wants to be checked for the flu.  Her 2-year-old daughter has had a runny nose but is in school and her school has had multiple flu positive kids.      URI    This is a new problem. The current episode started yesterday. The problem has been gradually worsening. There has been no fever. Associated symptoms include congestion, coughing, headaches, nausea, rhinorrhea, sinus pain and sneezing. Pertinent negatives include no ear pain, rash, sore throat, vomiting or wheezing. She has tried nothing for the symptoms. The treatment provided no relief.       Constitution: Positive for chills and fatigue. Negative for sweating and fever.   HENT: Positive for congestion, sinus pain and sinus pressure. Negative for ear pain, sore throat and voice change.    Neck: Negative for painful lymph nodes.   Eyes: " Negative for eye redness.   Respiratory: Positive for cough and sputum production. Negative for chest tightness, bloody sputum, COPD, shortness of breath, stridor, wheezing and asthma.    Gastrointestinal: Positive for nausea. Negative for vomiting.   Musculoskeletal: Positive for muscle ache.   Skin: Negative for rash and erythema.   Allergic/Immunologic: Positive for sneezing. Negative for seasonal allergies and asthma.   Neurological: Positive for headaches.   Hematologic/Lymphatic: Negative for swollen lymph nodes.       Objective:      Physical Exam   Constitutional: She is oriented to person, place, and time. She appears well-developed and well-nourished.   HENT:   Head: Normocephalic and atraumatic.   Right Ear: Hearing, tympanic membrane, external ear and ear canal normal.   Left Ear: Hearing, tympanic membrane, external ear and ear canal normal.   Nose: Rhinorrhea present. Right sinus exhibits no maxillary sinus tenderness and no frontal sinus tenderness. Left sinus exhibits no maxillary sinus tenderness and no frontal sinus tenderness.   Mouth/Throat: Uvula is midline and oropharynx is clear and moist.   Eyes: Conjunctivae are normal.   Neck: Normal range of motion. Neck supple. No thyromegaly present.   Cardiovascular: Normal rate and regular rhythm. Exam reveals no gallop and no friction rub.   No murmur heard.  Pulmonary/Chest: Effort normal and breath sounds normal. She has no wheezes. She has no rales.   Musculoskeletal: Normal range of motion.   Lymphadenopathy:     She has no cervical adenopathy.   Neurological: She is alert and oriented to person, place, and time.   Skin: Skin is warm and dry. No rash noted. No erythema.   Psychiatric: She has a normal mood and affect. Her behavior is normal. Judgment and thought content normal.   Nursing note and vitals reviewed.      Results for orders placed or performed in visit on 01/30/19   POCT Influenza A/B   Result Value Ref Range    Rapid Influenza A Ag  Positive (A) Negative    Rapid Influenza B Ag Negative Negative     Acceptable Yes        Assessment:       1. Influenza    2. Cough        Plan:         Influenza    Cough  -     POCT Influenza A/B        Charis was seen today for uri.    Diagnoses and all orders for this visit:    Influenza    Cough  -     POCT Influenza A/B      Patient Instructions     - Rest.    - Drink plenty of fluids.    - Tylenol or Ibuprofen as directed as needed for fever/pain.    - Take over-the-counter claritin, zyrtec, allegra, or xyzal as directed.  You should NOT use a decongestant form (D) of this medication if you have a history of hypertension or heart disease.  - Use over the counter Flonase as directed for sinus congestion and postnasal drip.  - use nasal saline prior to Flonase.  - stop using Flonase if you developed nosebleeds.  - Use Ocean Spray Nasal Saline 1-3 puffs each nostril every 2-3 hours then blow out onto tissue. This is to irrigate the nasal passage way to clear the sinus openings. Use until sinus problem resolved.   - Follow up with your PCP or specialty clinic as directed in the next 1-2 weeks if not improved or as needed.  You can call [phone] to schedule an appointment with the appropriate provider.    - Go to the ED if your symptoms worsen.  - You must understand that you have received an Urgent Care treatment only and that you may be released before all of your medical problems are known or treated.   - You, the patient, will arrange for follow up care as instructed.   - If your condition worsens or fails to improve we recommend that you receive another evaluation at the ER immediately or contact your PCP to discuss your concerns or return here.       Influenza (Adult)    Influenza is also called the flu. It is a viral illness that affects the air passages of your lungs. It is different from the common cold. The flu can easily be passed from one to person to another. It may be spread  through the air by coughing and sneezing. Or it can be spread by touching the sick person and then touching your own eyes, nose, or mouth.  The flu starts 1 to 3 days after you are exposed to the flu virus. It may last for 1 to 2 weeks but many people feel tired or fatigued for many weeks afterward. You usually dont need to take antibiotics unless you have a complication. This might be an ear or sinus infection or pneumonia.  Symptoms of the flu may be mild or severe. They can include extreme tiredness (wanting to stay in bed all day), chills, fevers, muscle aches, soreness with eye movement, headache, and a dry, hacking cough.  Home care  Follow these guidelines when caring for yourself at home:  · Avoid being around cigarette smoke, whether yours or other peoples.  · Acetaminophen or ibuprofen will help ease your fever, muscle aches, and headache. Dont give aspirin to anyone younger than 18 who has the flu. Aspirin can harm the liver.  · Nausea and loss of appetite are common with the flu. Eat light meals. Drink 6 to 8 glasses of liquids every day. Good choices are water, sport drinks, soft drinks without caffeine, juices, tea, and soup. Extra fluids will also help loosen secretions in your nose and lungs.  · Over-the-counter cold medicines will not make the flu go away faster. But the medicines may help with coughing, sore throat, and congestion in your nose and sinuses. Dont use a decongestant if you have high blood pressure.  · Stay home until your fever has been gone for at least 24 hours without using medicine to reduce fever.  Follow-up care  Follow up with your healthcare provider, or as advised, if you are not getting better over the next week.  If you are age 65 or older, talk with your provider about getting a pneumococcal vaccine every 5 years. You should also get this vaccine if you have chronic asthma or COPD. All adults should get a flu vaccine every fall. Ask your provider about this.  When to  seek medical advice  Call your healthcare provider right away if any of these occur:  · Cough with lots of colored mucus (sputum) or blood in your mucus  · Chest pain, shortness of breath, wheezing, or trouble breathing  · Severe headache, or face, neck, or ear pain  · New rash with fever  · Fever of 100.4°F (38°C) or higher, or as directed by your healthcare provider  · Confusion, behavior change, or seizure  · Severe weakness or dizziness  · You get a new fever or cough after getting better for a few days  Date Last Reviewed: 1/1/2017 © 2000-2017 [employer]. [address] [place], [place] [id number]. All rights reserved. This information is not intended as a substitute for professional medical care. Always follow your healthcare professional's instructions.        The Flu (Influenza)     The virus that causes the flu spreads through the air in droplets when someone who has the flu coughs, sneezes, laughs, or talks.   The flu (influenza) is an infection that affects your respiratory tract. This tract is made up of your mouth, nose, and lungs, and the passages between them. Unlike a cold, the flu can make you very ill. And it can lead to pneumonia, a serious lung infection. The flu can have serious complications and even cause death.  Who is at risk for the flu?  Anyone can get the flu. But you are more likely to become infected if you:  · Have a weakened immune system  · Work in a healthcare setting where you may be exposed to flu germs  · Live or work with someone who has the flu  · Havent had an annual flu shot  How does the flu spread?  The flu is caused by a virus. The virus spreads through the air in droplets when someone who has the flu coughs, sneezes, laughs, or talks. You can become infected when you inhale these viruses directly. You can also become infected when you touch a surface on which the droplets have landed and then transfer the germs to your eyes, nose, or mouth. Touching used  tissues, or sharing utensils, drinking glasses, or a toothbrush from an infected person can expose you to flu viruses, too.  What are the symptoms of the flu?  Flu symptoms tend to come on quickly and may last a few days to a few weeks. They include:  · Fever usually higher than 100.4°F  (38°C) and chills  · Sore throat and headache  · Dry cough  · Runny nose  · Tiredness and weakness  · Muscle aches  Who is at risk for flu complications?  For some people, the flu can be very serious. The risk for complications is greater for:  · Children younger than age 5  · Adults ages 65 and older  · People with a chronic illness such as diabetes or heart, kidney, or lung disease  · People who live in a nursing home or long-term care facility   How is the flu treated?  The flu usually gets better after 7 days or so. In some cases, your healthcare provider may prescribe an antiviral medicine. This may help you get well a little sooner. For the medicine to help, you need to take it as soon as possible (ideally within 48 hours) after your symptoms start. If you develop pneumonia or other serious illness, you may need to stay in the hospital.  Easing flu symptoms  · Drink lots of fluids such as water, juice, and warm soup. A good rule is to drink enough so that you urinate your normal amount.  · Get plenty of rest.  · Ask your healthcare provider what to take for fever and pain.  · Call your provider if your fever is 100.4°F (38°C) or higher, or you become dizzy, lightheaded, or short of breath.  Taking steps to protect others  · Wash your hands often, especially after coughing or sneezing. Or clean your hands with an alcohol-based hand  containing at least 60% alcohol.  · Cough or sneeze into a tissue. Then throw the tissue away and wash your hands. If you dont have a tissue, cough and sneeze into your elbow.  · Stay home until at least 24 hours after you no longer have a fever or chills. Be sure the fever isnt being  hidden by fever-reducing medicine.  · Dont share food, utensils, drinking glasses, or a toothbrush with others.  · Ask your healthcare provider if others in your household should get antiviral medicine to help them avoid infection.  How can the flu be prevented?  · One of the best ways to avoid the flu is to get a flu vaccine each year. The virus that causes the flu changes from year to year. For that reason, healthcare providers recommend getting the flu vaccine each year, as soon as it's available in your area. The vaccine is given as a shot. Your healthcare provider can tell you which vaccine is right for you. A nasal spray is also available but is not recommended for the 0033-8229 flu season. The CDC says this is because the nasal spray did not seem to protect against the flu over the last several flu seasons. In the past, it was meant for people ages 2 to 49.  · Wash your hands often. Frequent handwashing is a proven way to help prevent infection.  · Carry an alcohol-based hand gel containing at least 60% alcohol. Use it when you can't use soap and water. Then wash your hands as soon as you can.  · Avoid touching your eyes, nose, and mouth.  · At home and work, clean phones, computer keyboards, and toys often with disinfectant wipes.  · If possible, avoid close contact with others who have the flu or symptoms of the flu.  Handwashing tips  Handwashing is one of the best ways to prevent many common infections. If you are caring for or visiting someone with the flu, wash your hands each time you enter and leave the room. Follow these steps:  · Use warm water and plenty of soap. Rub your hands together well.  · Clean the whole hand, including under your nails, between your fingers, and up the wrists.  · Wash for at least 15 seconds.  · Rinse, letting the water run down your fingers, not up your wrists.  · Dry your hands well. Use a paper towel to turn off the faucet and open the door.  Using alcohol-based hand    Alcohol-based hand  are also a good choice. Use them when you can't use soap and water. Follow these steps:  · Squeeze about a tablespoon of gel into the palm of one hand.  · Rub your hands together briskly, cleaning the backs of your hands, the palms, between your fingers, and up the wrists.  · Rub until the gel is gone and your hands are completely dry.  Preventing the flu in healthcare settings  The flu is a special concern for people in hospitals and long-term care facilities. To help prevent the spread of flu, many hospitals and nursing homes take these steps:  · Healthcare providers wash their hands or use an alcohol-based hand  before and after treating each patient.  · People with the flu have private rooms and bathrooms or share a room with someone with the same infection.  · People who are at high risk for the flu but don't have it are encouraged to get the flu and pneumonia vaccines.  · All healthcare workers are encouraged or required to get flu shots.   Date Last Reviewed: 12/1/2016  © 9065-7286 [employer]. [address] [place] [address], [place] [address]. All rights reserved. This information is not intended as a substitute for professional medical care. Always follow your healthcare professional's instructions.

## 2019-01-30 NOTE — PATIENT INSTRUCTIONS
- Rest.    - Drink plenty of fluids.    - Tylenol or Ibuprofen as directed as needed for fever/pain.    - Take over-the-counter claritin, zyrtec, allegra, or xyzal as directed.  You should NOT use a decongestant form (D) of this medication if you have a history of hypertension or heart disease.  - Use over the counter Flonase as directed for sinus congestion and postnasal drip.  - use nasal saline prior to Flonase.  - stop using Flonase if you developed nosebleeds.  - Use Ocean Spray Nasal Saline 1-3 puffs each nostril every 2-3 hours then blow out onto tissue. This is to irrigate the nasal passage way to clear the sinus openings. Use until sinus problem resolved.   - Follow up with your PCP or specialty clinic as directed in the next 1-2 weeks if not improved or as needed.  You can call [phone] to schedule an appointment with the appropriate provider.    - Go to the ED if your symptoms worsen.  - You must understand that you have received an Urgent Care treatment only and that you may be released before all of your medical problems are known or treated.   - You, the patient, will arrange for follow up care as instructed.   - If your condition worsens or fails to improve we recommend that you receive another evaluation at the ER immediately or contact your PCP to discuss your concerns or return here.       Influenza (Adult)    Influenza is also called the flu. It is a viral illness that affects the air passages of your lungs. It is different from the common cold. The flu can easily be passed from one to person to another. It may be spread through the air by coughing and sneezing. Or it can be spread by touching the sick person and then touching your own eyes, nose, or mouth.  The flu starts 1 to 3 days after you are exposed to the flu virus. It may last for 1 to 2 weeks but many people feel tired or fatigued for many weeks afterward. You usually dont need to take antibiotics unless you have a complication.  This might be an ear or sinus infection or pneumonia.  Symptoms of the flu may be mild or severe. They can include extreme tiredness (wanting to stay in bed all day), chills, fevers, muscle aches, soreness with eye movement, headache, and a dry, hacking cough.  Home care  Follow these guidelines when caring for yourself at home:  · Avoid being around cigarette smoke, whether yours or other peoples.  · Acetaminophen or ibuprofen will help ease your fever, muscle aches, and headache. Dont give aspirin to anyone younger than 18 who has the flu. Aspirin can harm the liver.  · Nausea and loss of appetite are common with the flu. Eat light meals. Drink 6 to 8 glasses of liquids every day. Good choices are water, sport drinks, soft drinks without caffeine, juices, tea, and soup. Extra fluids will also help loosen secretions in your nose and lungs.  · Over-the-counter cold medicines will not make the flu go away faster. But the medicines may help with coughing, sore throat, and congestion in your nose and sinuses. Dont use a decongestant if you have high blood pressure.  · Stay home until your fever has been gone for at least 24 hours without using medicine to reduce fever.  Follow-up care  Follow up with your healthcare provider, or as advised, if you are not getting better over the next week.  If you are age 65 or older, talk with your provider about getting a pneumococcal vaccine every 5 years. You should also get this vaccine if you have chronic asthma or COPD. All adults should get a flu vaccine every fall. Ask your provider about this.  When to seek medical advice  Call your healthcare provider right away if any of these occur:  · Cough with lots of colored mucus (sputum) or blood in your mucus  · Chest pain, shortness of breath, wheezing, or trouble breathing  · Severe headache, or face, neck, or ear pain  · New rash with fever  · Fever of 100.4°F (38°C) or higher, or as directed by your healthcare  provider  · Confusion, behavior change, or seizure  · Severe weakness or dizziness  · You get a new fever or cough after getting better for a few days  Date Last Reviewed: 1/1/2017  © 7156-4472 [employer]. [address] [place] [address], [place] [address]. All rights reserved. This information is not intended as a substitute for professional medical care. Always follow your healthcare professional's instructions.        The Flu (Influenza)     The virus that causes the flu spreads through the air in droplets when someone who has the flu coughs, sneezes, laughs, or talks.   The flu (influenza) is an infection that affects your respiratory tract. This tract is made up of your mouth, nose, and lungs, and the passages between them. Unlike a cold, the flu can make you very ill. And it can lead to pneumonia, a serious lung infection. The flu can have serious complications and even cause death.  Who is at risk for the flu?  Anyone can get the flu. But you are more likely to become infected if you:  · Have a weakened immune system  · Work in a healthcare setting where you may be exposed to flu germs  · Live or work with someone who has the flu  · Havent had an annual flu shot  How does the flu spread?  The flu is caused by a virus. The virus spreads through the air in droplets when someone who has the flu coughs, sneezes, laughs, or talks. You can become infected when you inhale these viruses directly. You can also become infected when you touch a surface on which the droplets have landed and then transfer the germs to your eyes, nose, or mouth. Touching used tissues, or sharing utensils, drinking glasses, or a toothbrush from an infected person can expose you to flu viruses, too.  What are the symptoms of the flu?  Flu symptoms tend to come on quickly and may last a few days to a few weeks. They include:  · Fever usually higher than 100.4°F  (38°C) and chills  · Sore throat and headache  · Dry cough  · Runny  nose  · Tiredness and weakness  · Muscle aches  Who is at risk for flu complications?  For some people, the flu can be very serious. The risk for complications is greater for:  · Children younger than age 5  · Adults ages 65 and older  · People with a chronic illness such as diabetes or heart, kidney, or lung disease  · People who live in a nursing home or long-term care facility   How is the flu treated?  The flu usually gets better after 7 days or so. In some cases, your healthcare provider may prescribe an antiviral medicine. This may help you get well a little sooner. For the medicine to help, you need to take it as soon as possible (ideally within 48 hours) after your symptoms start. If you develop pneumonia or other serious illness, you may need to stay in the hospital.  Easing flu symptoms  · Drink lots of fluids such as water, juice, and warm soup. A good rule is to drink enough so that you urinate your normal amount.  · Get plenty of rest.  · Ask your healthcare provider what to take for fever and pain.  · Call your provider if your fever is 100.4°F (38°C) or higher, or you become dizzy, lightheaded, or short of breath.  Taking steps to protect others  · Wash your hands often, especially after coughing or sneezing. Or clean your hands with an alcohol-based hand  containing at least 60% alcohol.  · Cough or sneeze into a tissue. Then throw the tissue away and wash your hands. If you dont have a tissue, cough and sneeze into your elbow.  · Stay home until at least 24 hours after you no longer have a fever or chills. Be sure the fever isnt being hidden by fever-reducing medicine.  · Dont share food, utensils, drinking glasses, or a toothbrush with others.  · Ask your healthcare provider if others in your household should get antiviral medicine to help them avoid infection.  How can the flu be prevented?  · One of the best ways to avoid the flu is to get a flu vaccine each year. The virus that causes  the flu changes from year to year. For that reason, healthcare providers recommend getting the flu vaccine each year, as soon as it's available in your area. The vaccine is given as a shot. Your healthcare provider can tell you which vaccine is right for you. A nasal spray is also available but is not recommended for the 9062-4317 flu season. The CDC says this is because the nasal spray did not seem to protect against the flu over the last several flu seasons. In the past, it was meant for people ages 2 to 49.  · Wash your hands often. Frequent handwashing is a proven way to help prevent infection.  · Carry an alcohol-based hand gel containing at least 60% alcohol. Use it when you can't use soap and water. Then wash your hands as soon as you can.  · Avoid touching your eyes, nose, and mouth.  · At home and work, clean phones, computer keyboards, and toys often with disinfectant wipes.  · If possible, avoid close contact with others who have the flu or symptoms of the flu.  Handwashing tips  Handwashing is one of the best ways to prevent many common infections. If you are caring for or visiting someone with the flu, wash your hands each time you enter and leave the room. Follow these steps:  · Use warm water and plenty of soap. Rub your hands together well.  · Clean the whole hand, including under your nails, between your fingers, and up the wrists.  · Wash for at least 15 seconds.  · Rinse, letting the water run down your fingers, not up your wrists.  · Dry your hands well. Use a paper towel to turn off the faucet and open the door.  Using alcohol-based hand   Alcohol-based hand  are also a good choice. Use them when you can't use soap and water. Follow these steps:  · Squeeze about a tablespoon of gel into the palm of one hand.  · Rub your hands together briskly, cleaning the backs of your hands, the palms, between your fingers, and up the wrists.  · Rub until the gel is gone and your hands are  completely dry.  Preventing the flu in healthcare settings  The flu is a special concern for people in hospitals and long-term care facilities. To help prevent the spread of flu, many hospitals and nursing homes take these steps:  · Healthcare providers wash their hands or use an alcohol-based hand  before and after treating each patient.  · People with the flu have private rooms and bathrooms or share a room with someone with the same infection.  · People who are at high risk for the flu but don't have it are encouraged to get the flu and pneumonia vaccines.  · All healthcare workers are encouraged or required to get flu shots.   Date Last Reviewed: 12/1/2016  © 9981-2532 [employer]. [address] [place] [address], [place] [address]. All rights reserved. This information is not intended as a substitute for professional medical care. Always follow your healthcare professional's instructions.

## 2019-05-29 ENCOUNTER — LAB VISIT (OUTPATIENT)
Dept: LAB | Facility: OTHER | Age: 34
End: 2019-05-29
Attending: OBSTETRICS & GYNECOLOGY
Payer: COMMERCIAL

## 2019-05-29 ENCOUNTER — OFFICE VISIT (OUTPATIENT)
Dept: OBSTETRICS AND GYNECOLOGY | Facility: CLINIC | Age: 34
End: 2019-05-29
Payer: COMMERCIAL

## 2019-05-29 VITALS
SYSTOLIC BLOOD PRESSURE: 111 MMHG | HEIGHT: 64 IN | BODY MASS INDEX: 29.77 KG/M2 | WEIGHT: 174.38 LBS | DIASTOLIC BLOOD PRESSURE: 68 MMHG

## 2019-05-29 DIAGNOSIS — Z32.01 POSITIVE PREGNANCY TEST: ICD-10-CM

## 2019-05-29 DIAGNOSIS — Z32.01 POSITIVE PREGNANCY TEST: Primary | ICD-10-CM

## 2019-05-29 LAB
ABO GROUP BLD: NORMAL
ANION GAP SERPL CALC-SCNC: 12 MMOL/L (ref 8–16)
BASOPHILS # BLD AUTO: 0.02 K/UL (ref 0–0.2)
BASOPHILS NFR BLD: 0.2 % (ref 0–1.9)
BLD GP AB SCN CELLS X3 SERPL QL: NORMAL
BUN SERPL-MCNC: 9 MG/DL (ref 6–20)
CALCIUM SERPL-MCNC: 9.4 MG/DL (ref 8.7–10.5)
CHLORIDE SERPL-SCNC: 103 MMOL/L (ref 95–110)
CO2 SERPL-SCNC: 23 MMOL/L (ref 23–29)
CREAT SERPL-MCNC: 0.7 MG/DL (ref 0.5–1.4)
DIFFERENTIAL METHOD: NORMAL
EOSINOPHIL # BLD AUTO: 0.1 K/UL (ref 0–0.5)
EOSINOPHIL NFR BLD: 0.7 % (ref 0–8)
ERYTHROCYTE [DISTWIDTH] IN BLOOD BY AUTOMATED COUNT: 13.1 % (ref 11.5–14.5)
EST. GFR  (AFRICAN AMERICAN): >60 ML/MIN/1.73 M^2
EST. GFR  (NON AFRICAN AMERICAN): >60 ML/MIN/1.73 M^2
GLUCOSE SERPL-MCNC: 75 MG/DL (ref 70–110)
HCT VFR BLD AUTO: 41.1 % (ref 37–48.5)
HGB BLD-MCNC: 14.2 G/DL (ref 12–16)
LYMPHOCYTES # BLD AUTO: 3.4 K/UL (ref 1–4.8)
LYMPHOCYTES NFR BLD: 28.9 % (ref 18–48)
MCH RBC QN AUTO: 29.3 PG (ref 27–31)
MCHC RBC AUTO-ENTMCNC: 34.5 G/DL (ref 32–36)
MCV RBC AUTO: 85 FL (ref 82–98)
MONOCYTES # BLD AUTO: 0.6 K/UL (ref 0.3–1)
MONOCYTES NFR BLD: 5 % (ref 4–15)
NEUTROPHILS # BLD AUTO: 7.6 K/UL (ref 1.8–7.7)
NEUTROPHILS NFR BLD: 64.9 % (ref 38–73)
PLATELET # BLD AUTO: 291 K/UL (ref 150–350)
PMV BLD AUTO: 9.7 FL (ref 9.2–12.9)
POTASSIUM SERPL-SCNC: 3.3 MMOL/L (ref 3.5–5.1)
RBC # BLD AUTO: 4.84 M/UL (ref 4–5.4)
RH BLD: NORMAL
SODIUM SERPL-SCNC: 138 MMOL/L (ref 136–145)
WBC # BLD AUTO: 11.7 K/UL (ref 3.9–12.7)

## 2019-05-29 PROCEDURE — 86762 RUBELLA ANTIBODY: CPT

## 2019-05-29 PROCEDURE — 86703 HIV-1/HIV-2 1 RESULT ANTBDY: CPT

## 2019-05-29 PROCEDURE — 87340 HEPATITIS B SURFACE AG IA: CPT

## 2019-05-29 PROCEDURE — 87086 URINE CULTURE/COLONY COUNT: CPT

## 2019-05-29 PROCEDURE — 86901 BLOOD TYPING SEROLOGIC RH(D): CPT

## 2019-05-29 PROCEDURE — 86850 RBC ANTIBODY SCREEN: CPT

## 2019-05-29 PROCEDURE — 86592 SYPHILIS TEST NON-TREP QUAL: CPT

## 2019-05-29 PROCEDURE — 36415 COLL VENOUS BLD VENIPUNCTURE: CPT

## 2019-05-29 PROCEDURE — 99999 PR PBB SHADOW E&M-EST. PATIENT-LVL III: CPT | Mod: PBBFAC,,, | Performed by: OBSTETRICS & GYNECOLOGY

## 2019-05-29 PROCEDURE — 85025 COMPLETE CBC W/AUTO DIFF WBC: CPT

## 2019-05-29 PROCEDURE — 99999 PR PBB SHADOW E&M-EST. PATIENT-LVL III: ICD-10-PCS | Mod: PBBFAC,,, | Performed by: OBSTETRICS & GYNECOLOGY

## 2019-05-29 PROCEDURE — 87491 CHLMYD TRACH DNA AMP PROBE: CPT

## 2019-05-29 PROCEDURE — 80048 BASIC METABOLIC PNL TOTAL CA: CPT

## 2019-05-29 PROCEDURE — 99214 PR OFFICE/OUTPT VISIT, EST, LEVL IV, 30-39 MIN: ICD-10-PCS | Mod: S$GLB,,, | Performed by: OBSTETRICS & GYNECOLOGY

## 2019-05-29 PROCEDURE — 99214 OFFICE O/P EST MOD 30 MIN: CPT | Mod: S$GLB,,, | Performed by: OBSTETRICS & GYNECOLOGY

## 2019-05-29 NOTE — PROGRESS NOTES
CC: Absence of menses    Charis Wells is a 33 y.o. female  presents with complaint of absence of menstruation.  She denies nausea/vomIting/abdominal pain/bleeding.  UPT is positive.   IVF cycle, transfer was .  One embryo implanted.   NORMAL GENETICS but DO NOT TELL GENDER    Past Medical History:   Diagnosis Date    Allergy     Chronic kidney disease     born with kidney disease been under control since age 12    Keloid cicatrix     Lipoma of unspecified site 2012     Past Surgical History:   Procedure Laterality Date     SECTION      COLONOSCOPY - Miralax split prep N/A 2016    Performed by Jose Antonio Jr., [facility] END[facility]    DELIVERY-CEASAREAN SECTION N/A 2016    [facility]enzie [facility] L[facility]    LIPOMA RESECTION      right buttocks    TENDON REPAIR      bilateral feet    TONSILLECTOMY       Social History     Socioeconomic History    Marital status:      Spouse name: Not on file    Number of children: Not on file    Years of education: Not on file    Highest education level: Not on file   Occupational History    Occupation:      Employer: [employer]   Social Needs    Financial resource strain: Not on file    Food insecurity:     Worry: Not on file     Inability: Not on file    Transportation needs:     Medical: Not on file     Non-medical: Not on file   Tobacco Use    Smoking status: Never Smoker    Smokeless tobacco: Never Used   Substance and Sexual Activity    Alcohol use: No     Comment: occasional    Drug use: No    Sexual activity: Yes     Partners: Male     Birth control/protection: None   Lifestyle    Physical activity:     Days per week: Not on file     Minutes per session: Not on file    Stress: Not on file   Relationships    Social connections:     Talks on phone: Not on file     Gets together: Not on file     Attends Roman [employer] service: Not on file     Active member of club or organization:  "Not on file     Attends meetings of clubs or organizations: Not on file     Relationship status: Not on file   Other Topics Concern    Are you pregnant or think you may be? No    Breast-feeding No   Social History Narrative    Not on file     Family History   Problem Relation Age of Onset    Cancer Mother         breast    Breast cancer Mother 35        , negative genetic testing    Cancer Maternal Grandmother         throat    Diabetes Paternal Grandfather     Melanoma Neg Hx     Colon cancer Neg Hx     Ovarian cancer Neg Hx      OB History    Para Term  AB Living   3 1 1   1 1   SAB TAB Ectopic Multiple Live Births   1     0 1      # Outcome Date GA Lbr Edwin/2nd Weight Sex Delivery Anes PTL Lv   3 Current            2 Term 16 39w3d  3.26 kg (7 lb 3 oz) F CS-LTranv EPI N JEFFERSON      Complications: Failure to Progress in First Stage   1 SAB 2015 4w0d              /68   Ht 5' 4" (1.626 m)   Wt 79.1 kg (174 lb 6.1 oz)   LMP 2019   BMI 29.93 kg/m²     ROS:  GENERAL: Denies weight gain or weight loss. Feeling well overall.   SKIN: Denies rash or lesions.   HEAD: Denies head injury or headache.   NODES: Denies enlarged lymph nodes.   CHEST: Denies chest pain or shortness of breath.   CARDIOVASCULAR: Denies palpitations or left sided chest pain.   ABDOMEN: No abdominal pain, constipation, diarrhea, nausea, vomiting or rectal bleeding.   URINARY: No frequency, dysuria, hematuria, or burning on urination.  REPRODUCTIVE: See HPI.   BREASTS: The patient performs breast self-examination and denies pain, lumps, or nipple discharge.   HEMATOLOGIC: No easy bruisability or excessive bleeding.   MUSCULOSKELETAL: Denies joint pain or swelling.   NEUROLOGIC: Denies syncope or weakness.   PSYCHIATRIC: Denies depression, anxiety or mood swings.    PE:   APPEARANCE: Well nourished, well developed, in no acute distress.  AFFECT: WNL, alert and oriented x 3.  SKIN: No acne or hirsutism.  NECK: " Neck symmetric without masses or thyromegaly.  NODES: No inguinal, cervical, axillary or femoral lymph node enlargement.  CHEST: Good respiratory effort.   ABDOMEN: Soft. No tenderness or masses. No hepatosplenomegaly. No hernias.  BREASTS: Symmetrical, no skin changes or visible lesions. No palpable masses, nipple discharge bilaterally.  PELVIC: Normal external female genitalia without lesions. Normal hair distribution. Adequate perineal body, normal urethral meatus. Vagina moist and well rugated without lesions or discharge. Cervix pink, without lesions, discharge or tenderness. No significant cystocele or rectocele. Bimanual exam shows uterus is 7 weeks, regular, mobile and nontender. Adnexa without masses or tenderness.  EXTREMITIES: No edema.        ASSESSMENT and PLAN:    ICD-10-CM ICD-9-CM    1. Positive pregnancy test Z32.01 V72.42 HIV 1/2 Ag/Ab (4th Gen)      RPR      Hepatitis B surface antigen      Type & Screen      Rubella antibody, IgG      Urine culture      CBC auto differential      Basic metabolic panel      US MFM Procedure ([facility])      C. trachomatis/N. gonorrhoeae by AMP DNA [facility]; Cervix   IUP at 12 6 weeks,  S/P IVF- awaiting records        Patient was counseled today on proper weight gain based on the [place] [facility]'s recommendations based on her pre-pregnancy weight. Discussed foods to avoid in pregnancy (i.e. sushi, fish that are high in mercury, deli meat, and unpasteurized cheeses). Discussed prenatal vitamin options (i.e. stool softener, DHA). Contingency screen offered - patient desires.    Follow up if symptoms worsen or fail to improve.

## 2019-05-30 ENCOUNTER — PATIENT MESSAGE (OUTPATIENT)
Dept: OBSTETRICS AND GYNECOLOGY | Facility: CLINIC | Age: 34
End: 2019-05-30

## 2019-05-30 LAB
C TRACH DNA SPEC QL NAA+PROBE: NOT DETECTED
HBV SURFACE AG SERPL QL IA: NEGATIVE
HIV 1+2 AB+HIV1 P24 AG SERPL QL IA: NEGATIVE
N GONORRHOEA DNA SPEC QL NAA+PROBE: NOT DETECTED
RPR SER QL: NORMAL
RUBV IGG SER-ACNC: 28.3 IU/ML
RUBV IGG SER-IMP: REACTIVE

## 2019-05-31 LAB
BACTERIA UR CULT: NORMAL
BACTERIA UR CULT: NORMAL

## 2019-06-25 ENCOUNTER — ROUTINE PRENATAL (OUTPATIENT)
Dept: OBSTETRICS AND GYNECOLOGY | Facility: CLINIC | Age: 34
End: 2019-06-25
Payer: COMMERCIAL

## 2019-06-25 VITALS
WEIGHT: 177.38 LBS | DIASTOLIC BLOOD PRESSURE: 73 MMHG | BODY MASS INDEX: 30.44 KG/M2 | SYSTOLIC BLOOD PRESSURE: 109 MMHG

## 2019-06-25 DIAGNOSIS — O34.219 HISTORY OF CESAREAN SECTION COMPLICATING PREGNANCY: ICD-10-CM

## 2019-06-25 DIAGNOSIS — Z3A.16 16 WEEKS GESTATION OF PREGNANCY: Primary | ICD-10-CM

## 2019-06-25 PROCEDURE — 99999 PR PBB SHADOW E&M-EST. PATIENT-LVL III: CPT | Mod: PBBFAC,,, | Performed by: OBSTETRICS & GYNECOLOGY

## 2019-06-25 PROCEDURE — 0502F PR SUBSEQUENT PRENATAL CARE: ICD-10-PCS | Mod: S$GLB,,, | Performed by: OBSTETRICS & GYNECOLOGY

## 2019-06-25 PROCEDURE — 0502F SUBSEQUENT PRENATAL CARE: CPT | Mod: S$GLB,,, | Performed by: OBSTETRICS & GYNECOLOGY

## 2019-06-25 PROCEDURE — 99999 PR PBB SHADOW E&M-EST. PATIENT-LVL III: ICD-10-PCS | Mod: PBBFAC,,, | Performed by: OBSTETRICS & GYNECOLOGY

## 2019-06-25 NOTE — PROGRESS NOTES
HERE for routine OB visit at 16 3/7 wks, with NO complaints.  Denies vaginal bleeding, cramping/ ctx.   MFM scan.  May .  F/U in four weeks

## 2019-06-30 ENCOUNTER — PATIENT MESSAGE (OUTPATIENT)
Dept: OBSTETRICS AND GYNECOLOGY | Facility: CLINIC | Age: 34
End: 2019-06-30

## 2019-07-01 RX ORDER — BUTALBITAL, ACETAMINOPHEN AND CAFFEINE 50; 325; 40 MG/1; MG/1; MG/1
1 TABLET ORAL EVERY 4 HOURS PRN
Qty: 30 TABLET | Refills: 1 | Status: SHIPPED | OUTPATIENT
Start: 2019-07-01 | End: 2019-07-30

## 2019-07-15 ENCOUNTER — TELEPHONE (OUTPATIENT)
Dept: PEDIATRIC CARDIOLOGY | Facility: CLINIC | Age: 34
End: 2019-07-15

## 2019-07-15 ENCOUNTER — PROCEDURE VISIT (OUTPATIENT)
Dept: MATERNAL FETAL MEDICINE | Facility: CLINIC | Age: 34
End: 2019-07-15
Payer: COMMERCIAL

## 2019-07-15 DIAGNOSIS — Z36.89 ENCOUNTER FOR FETAL ANATOMIC SURVEY: Primary | ICD-10-CM

## 2019-07-15 DIAGNOSIS — Z31.83 IN VITRO FERTILIZATION: ICD-10-CM

## 2019-07-15 DIAGNOSIS — Z32.01 POSITIVE PREGNANCY TEST: ICD-10-CM

## 2019-07-15 PROCEDURE — 76805 PR US, OB 14+WKS, TRANSABD, SINGLE GESTATION: ICD-10-PCS | Mod: S$GLB,,, | Performed by: OBSTETRICS & GYNECOLOGY

## 2019-07-15 PROCEDURE — 99499 NO LOS: ICD-10-PCS | Mod: S$GLB,,, | Performed by: OBSTETRICS & GYNECOLOGY

## 2019-07-15 PROCEDURE — 76805 OB US >/= 14 WKS SNGL FETUS: CPT | Mod: S$GLB,,, | Performed by: OBSTETRICS & GYNECOLOGY

## 2019-07-15 PROCEDURE — 99499 UNLISTED E&M SERVICE: CPT | Mod: S$GLB,,, | Performed by: OBSTETRICS & GYNECOLOGY

## 2019-07-30 ENCOUNTER — ROUTINE PRENATAL (OUTPATIENT)
Dept: OBSTETRICS AND GYNECOLOGY | Facility: CLINIC | Age: 34
End: 2019-07-30
Payer: COMMERCIAL

## 2019-07-30 VITALS
BODY MASS INDEX: 31.35 KG/M2 | DIASTOLIC BLOOD PRESSURE: 68 MMHG | WEIGHT: 182.63 LBS | SYSTOLIC BLOOD PRESSURE: 112 MMHG

## 2019-07-30 DIAGNOSIS — Z3A.21 21 WEEKS GESTATION OF PREGNANCY: Primary | ICD-10-CM

## 2019-07-30 PROCEDURE — 0502F SUBSEQUENT PRENATAL CARE: CPT | Mod: S$GLB,,, | Performed by: OBSTETRICS & GYNECOLOGY

## 2019-07-30 PROCEDURE — 99999 PR PBB SHADOW E&M-EST. PATIENT-LVL III: CPT | Mod: PBBFAC,,, | Performed by: OBSTETRICS & GYNECOLOGY

## 2019-07-30 PROCEDURE — 99999 PR PBB SHADOW E&M-EST. PATIENT-LVL III: ICD-10-PCS | Mod: PBBFAC,,, | Performed by: OBSTETRICS & GYNECOLOGY

## 2019-07-30 PROCEDURE — 0502F PR SUBSEQUENT PRENATAL CARE: ICD-10-PCS | Mod: S$GLB,,, | Performed by: OBSTETRICS & GYNECOLOGY

## 2019-07-30 NOTE — PROGRESS NOTES
HERE for routine OB visit at 21 3/7 wks, with NO complaints.  Denies vaginal bleeding, cramping/ ctx, or LOF.  + FM.  FMC BID.  OB screen CBC in four weeks  Fetal echo schedule for H/O IVF

## 2019-08-12 ENCOUNTER — OFFICE VISIT (OUTPATIENT)
Dept: PEDIATRIC CARDIOLOGY | Facility: CLINIC | Age: 34
End: 2019-08-12
Attending: PEDIATRICS
Payer: COMMERCIAL

## 2019-08-12 ENCOUNTER — CLINICAL SUPPORT (OUTPATIENT)
Dept: PEDIATRIC CARDIOLOGY | Facility: CLINIC | Age: 34
End: 2019-08-12
Attending: OBSTETRICS & GYNECOLOGY
Payer: COMMERCIAL

## 2019-08-12 VITALS
DIASTOLIC BLOOD PRESSURE: 68 MMHG | SYSTOLIC BLOOD PRESSURE: 112 MMHG | OXYGEN SATURATION: 98 % | HEART RATE: 92 BPM | DIASTOLIC BLOOD PRESSURE: 68 MMHG | WEIGHT: 186.5 LBS | BODY MASS INDEX: 31.07 KG/M2 | HEIGHT: 64 IN | SYSTOLIC BLOOD PRESSURE: 112 MMHG | WEIGHT: 186.5 LBS | BODY MASS INDEX: 31.84 KG/M2 | HEIGHT: 65 IN | HEART RATE: 82 BPM

## 2019-08-12 DIAGNOSIS — O09.812 PREGNANCY RESULTING FROM ASSISTED REPRODUCTIVE TECHNOLOGY IN SECOND TRIMESTER: Primary | ICD-10-CM

## 2019-08-12 DIAGNOSIS — Z36.89 ENCOUNTER FOR FETAL ANATOMIC SURVEY: ICD-10-CM

## 2019-08-12 PROCEDURE — 99242 PR OFFICE CONSULTATION,LEVEL II: ICD-10-PCS | Mod: 25,S$GLB,, | Performed by: PEDIATRICS

## 2019-08-12 PROCEDURE — 76825 PR  SO2 FETAL HEART: ICD-10-PCS | Mod: S$GLB,,, | Performed by: PEDIATRICS

## 2019-08-12 PROCEDURE — 99999 PR PBB SHADOW E&M-EST. PATIENT-LVL III: CPT | Mod: PBBFAC,,,

## 2019-08-12 PROCEDURE — 93325 DOPPLER ECHO COLOR FLOW MAPG: CPT | Mod: S$GLB,,, | Performed by: PEDIATRICS

## 2019-08-12 PROCEDURE — 99999 PR PBB SHADOW E&M-EST. PATIENT-LVL III: ICD-10-PCS | Mod: PBBFAC,,,

## 2019-08-12 PROCEDURE — 93325 PR DOPPLER COLOR FLOW VELOCITY MAP: ICD-10-PCS | Mod: S$GLB,,, | Performed by: PEDIATRICS

## 2019-08-12 PROCEDURE — 99999 PR PBB SHADOW E&M-EST. PATIENT-LVL III: ICD-10-PCS | Mod: PBBFAC,,, | Performed by: PEDIATRICS

## 2019-08-12 PROCEDURE — 76825 ECHO EXAM OF FETAL HEART: CPT | Mod: S$GLB,,, | Performed by: PEDIATRICS

## 2019-08-12 PROCEDURE — 76827 ECHO EXAM OF FETAL HEART: CPT | Mod: S$GLB,,, | Performed by: PEDIATRICS

## 2019-08-12 PROCEDURE — 76827 PR  SO2 FETAL HEART DOPPLER: ICD-10-PCS | Mod: S$GLB,,, | Performed by: PEDIATRICS

## 2019-08-12 PROCEDURE — 99242 OFF/OP CONSLTJ NEW/EST SF 20: CPT | Mod: 25,S$GLB,, | Performed by: PEDIATRICS

## 2019-08-12 PROCEDURE — 99999 PR PBB SHADOW E&M-EST. PATIENT-LVL III: CPT | Mod: PBBFAC,,, | Performed by: PEDIATRICS

## 2019-08-12 NOTE — LETTER
August 13, 2019        Lizzie Mackenzie MD  [address]  [address]  [facility] [address]             [facility] Pediatric Cardiology [facility] [address]  [address]  [facility] [address]  Phone: [phone]  Fax: [phone]   Patient: Charis Wells   MR Number: [id number]   YOB: 1985   Date of Visit: 8/12/2019       Dear Dr. Mackenzie:    Thank you for referring Charis Wells to me for evaluation. Below are the relevant portions of my assessment and plan of care.            If you have questions, please do not hesitate to call me. I look forward to following Charis along with you.    Sincerely,      Anaid Goodson MD           CC  No Recipients

## 2019-08-13 NOTE — PROGRESS NOTES
[facility] Pediatric Cardiology [place] [address] Fetal Cardiology Clinic    Today, I had the pleasure of evaluating Charis Wells who is now 33 y.o. and carrying her third pregnancy at 23 3/7 weeks gestation with an DIANA of 19. She was referred for evaluation of the fetal heart due IVF.      Pregnancy thus far has been uncomplicated.     Obstetric History:    .  Her OB history is notable for ectopic pregnancy and a term delivery via .     Past Medical History:   Diagnosis Date    Allergy     Chronic kidney disease     born with kidney disease been under control since age 12    Keloid cicatrix     Lipoma of unspecified site 2012         Current Outpatient Medications:     prenatal vit,calc76/iron/folic (PNV 29-1 ORAL), Take by mouth., Disp: , Rfl:      Review of patient's allergies indicates:   Allergen Reactions    No known drug allergies        Family History: Negative for congenital heart disease, genetic syndromes, multiple miscarriages or other congenital anomalies.    Social History: Ms. Charis Wells is  to the father of the baby.  The father of the baby is involved.     FETAL ECHOCARDIOGRAM (summary):  Fetal echo at 23 3/7wga , DIANA 19.  Normal fetal echocardiogram.  (Full report in electronic medical record)    Impression:  Single active fetus at 23 3/7 wga.  Normal fetal echocardiogram.      Todays fetal echocardiogram is normal, within the limitations of fetal echocardiography.  I discussed with her that fetal echocardiography is insufficiently sensitive to rule out all septal defects, anomalies of pulmonary and systemic veins, arch anomalies, and some valvar abnormalities, nor can it ensure that the ductus arteriosus and foramen ovale will spontaneously close.     Recommendations:  Location, timing, and mode of delivery will be determined by the obstetrical team.  She does not require further follow-up in the fetal echocardiography clinic, but I would be happy  to see her again if additional questions or concerns arise.    Should there be any concerns about the baby's heart after birth, a post-karen echocardiogram and cardiology consultation are recommended.         Anaid Goodson MD, MSCI  Pediatric Cardiology  Pediatric Echocardiography, Fetal Echocardiography, Cardiac MRI  [facility] [address] [place]  [address]  Phone [phone], Fax [phone]    The above information was discussed in detail including the use of diagrams, with 30 minutes of total face to face time, with greater than 50% with counseling and coordination of care.  The discussion of the diagnosis and treatment options is as described above.

## 2019-08-28 ENCOUNTER — ROUTINE PRENATAL (OUTPATIENT)
Dept: OBSTETRICS AND GYNECOLOGY | Facility: CLINIC | Age: 34
End: 2019-08-28
Payer: COMMERCIAL

## 2019-08-28 ENCOUNTER — LAB VISIT (OUTPATIENT)
Dept: LAB | Facility: OTHER | Age: 34
End: 2019-08-28
Attending: OBSTETRICS & GYNECOLOGY
Payer: COMMERCIAL

## 2019-08-28 VITALS — SYSTOLIC BLOOD PRESSURE: 101 MMHG | DIASTOLIC BLOOD PRESSURE: 60 MMHG | BODY MASS INDEX: 31.6 KG/M2 | WEIGHT: 187.38 LBS

## 2019-08-28 DIAGNOSIS — Z3A.25 25 WEEKS GESTATION OF PREGNANCY: Primary | ICD-10-CM

## 2019-08-28 DIAGNOSIS — Z34.92 PRENATAL CARE IN SECOND TRIMESTER: ICD-10-CM

## 2019-08-28 DIAGNOSIS — Z3A.21 21 WEEKS GESTATION OF PREGNANCY: ICD-10-CM

## 2019-08-28 LAB
BASOPHILS # BLD AUTO: 0.04 K/UL (ref 0–0.2)
BASOPHILS NFR BLD: 0.3 % (ref 0–1.9)
DIFFERENTIAL METHOD: ABNORMAL
EOSINOPHIL # BLD AUTO: 0 K/UL (ref 0–0.5)
EOSINOPHIL NFR BLD: 0.3 % (ref 0–8)
ERYTHROCYTE [DISTWIDTH] IN BLOOD BY AUTOMATED COUNT: 13.7 % (ref 11.5–14.5)
GLUCOSE SERPL-MCNC: 111 MG/DL (ref 70–140)
HCT VFR BLD AUTO: 37.2 % (ref 37–48.5)
HGB BLD-MCNC: 12.3 G/DL (ref 12–16)
IMM GRANULOCYTES # BLD AUTO: 0.06 K/UL (ref 0–0.04)
IMM GRANULOCYTES NFR BLD AUTO: 0.5 % (ref 0–0.5)
LYMPHOCYTES # BLD AUTO: 2 K/UL (ref 1–4.8)
LYMPHOCYTES NFR BLD: 16.2 % (ref 18–48)
MCH RBC QN AUTO: 30.1 PG (ref 27–31)
MCHC RBC AUTO-ENTMCNC: 33.1 G/DL (ref 32–36)
MCV RBC AUTO: 91 FL (ref 82–98)
MONOCYTES # BLD AUTO: 0.5 K/UL (ref 0.3–1)
MONOCYTES NFR BLD: 4.2 % (ref 4–15)
NEUTROPHILS # BLD AUTO: 9.4 K/UL (ref 1.8–7.7)
NEUTROPHILS NFR BLD: 78.5 % (ref 38–73)
NRBC BLD-RTO: 0 /100 WBC
PLATELET # BLD AUTO: 269 K/UL (ref 150–350)
PMV BLD AUTO: 9.8 FL (ref 9.2–12.9)
RBC # BLD AUTO: 4.08 M/UL (ref 4–5.4)
WBC # BLD AUTO: 12.03 K/UL (ref 3.9–12.7)

## 2019-08-28 PROCEDURE — 82950 GLUCOSE TEST: CPT

## 2019-08-28 PROCEDURE — 36415 COLL VENOUS BLD VENIPUNCTURE: CPT

## 2019-08-28 PROCEDURE — 85025 COMPLETE CBC W/AUTO DIFF WBC: CPT

## 2019-08-28 PROCEDURE — 99999 PR PBB SHADOW E&M-EST. PATIENT-LVL II: CPT | Mod: PBBFAC,,, | Performed by: NURSE PRACTITIONER

## 2019-08-28 PROCEDURE — 0502F SUBSEQUENT PRENATAL CARE: CPT | Mod: S$GLB,,, | Performed by: NURSE PRACTITIONER

## 2019-08-28 PROCEDURE — 0502F PR SUBSEQUENT PRENATAL CARE: ICD-10-PCS | Mod: S$GLB,,, | Performed by: NURSE PRACTITIONER

## 2019-08-28 PROCEDURE — 99999 PR PBB SHADOW E&M-EST. PATIENT-LVL II: ICD-10-PCS | Mod: PBBFAC,,, | Performed by: NURSE PRACTITIONER

## 2019-08-28 NOTE — PROGRESS NOTES
[facility] 25w4d, for JACOBY visit. Pt reports + FM, denies LOF, VB or ctx.   Glucose and CBC done today.  Pt reports that she has been having cracking and irritation on foot and in between toes. After looking at it, may possibly be athlete's foot. She reports that she can only wear close toe shoes to work and does sweat since it is hot. Recommended that she use an OTC athlete's foot cream and let feet air out as much as she can when not at work. Pt verbalized understanding.   -Reviewed  labor/bleeding precautions and when to call or go to [facility]  -Next visit with Dr. Mackenzie in 4 weeks

## 2019-09-23 ENCOUNTER — ROUTINE PRENATAL (OUTPATIENT)
Dept: OBSTETRICS AND GYNECOLOGY | Facility: CLINIC | Age: 34
End: 2019-09-23
Payer: COMMERCIAL

## 2019-09-23 VITALS
DIASTOLIC BLOOD PRESSURE: 64 MMHG | BODY MASS INDEX: 32.98 KG/M2 | WEIGHT: 195.56 LBS | SYSTOLIC BLOOD PRESSURE: 116 MMHG

## 2019-09-23 DIAGNOSIS — Z29.13 NEED FOR RHOGAM DUE TO RH NEGATIVE MOTHER: ICD-10-CM

## 2019-09-23 DIAGNOSIS — Z3A.29 29 WEEKS GESTATION OF PREGNANCY: Primary | ICD-10-CM

## 2019-09-23 PROCEDURE — 0502F PR SUBSEQUENT PRENATAL CARE: ICD-10-PCS | Mod: S$GLB,,, | Performed by: OBSTETRICS & GYNECOLOGY

## 2019-09-23 PROCEDURE — 0502F SUBSEQUENT PRENATAL CARE: CPT | Mod: S$GLB,,, | Performed by: OBSTETRICS & GYNECOLOGY

## 2019-09-23 PROCEDURE — 99999 PR PBB SHADOW E&M-EST. PATIENT-LVL III: ICD-10-PCS | Mod: PBBFAC,,, | Performed by: OBSTETRICS & GYNECOLOGY

## 2019-09-23 PROCEDURE — 99999 PR PBB SHADOW E&M-EST. PATIENT-LVL III: CPT | Mod: PBBFAC,,, | Performed by: OBSTETRICS & GYNECOLOGY

## 2019-09-23 RX ORDER — BUTALBITAL, ACETAMINOPHEN AND CAFFEINE 50; 325; 40 MG/1; MG/1; MG/1
TABLET ORAL
Refills: 1 | COMMUNITY
Start: 2019-08-05 | End: 2021-09-15

## 2019-09-24 NOTE — PROGRESS NOTES
HERE for routine OB visit at 29 2/7 wks, with NO complaints.  Denies vaginal bleeding, cramping/ ctx, or LOF.  + FM.  FMC BID.  PTL precautions. Needs RHOGAM tdap and FLU . F/ U in two weeks.

## 2019-10-10 ENCOUNTER — ROUTINE PRENATAL (OUTPATIENT)
Dept: OBSTETRICS AND GYNECOLOGY | Facility: CLINIC | Age: 34
End: 2019-10-10
Payer: COMMERCIAL

## 2019-10-10 VITALS
WEIGHT: 199.75 LBS | BODY MASS INDEX: 33.69 KG/M2 | DIASTOLIC BLOOD PRESSURE: 68 MMHG | SYSTOLIC BLOOD PRESSURE: 112 MMHG

## 2019-10-10 DIAGNOSIS — Z3A.31 31 WEEKS GESTATION OF PREGNANCY: Primary | ICD-10-CM

## 2019-10-10 DIAGNOSIS — O34.219 HISTORY OF CESAREAN SECTION COMPLICATING PREGNANCY: ICD-10-CM

## 2019-10-10 DIAGNOSIS — O36.63X0 EXCESSIVE FETAL GROWTH AFFECTING MANAGEMENT OF PREGNANCY IN THIRD TRIMESTER, SINGLE OR UNSPECIFIED FETUS: ICD-10-CM

## 2019-10-10 PROCEDURE — 90471 IMMUNIZATION ADMIN: CPT | Mod: S$GLB,,, | Performed by: OBSTETRICS & GYNECOLOGY

## 2019-10-10 PROCEDURE — 90472 TDAP VACCINE GREATER THAN OR EQUAL TO 7YO IM: ICD-10-PCS | Mod: S$GLB,,, | Performed by: OBSTETRICS & GYNECOLOGY

## 2019-10-10 PROCEDURE — 90686 FLU VACCINE (QUAD) GREATER THAN OR EQUAL TO 3YO PRESERVATIVE FREE IM: ICD-10-PCS | Mod: S$GLB,,, | Performed by: OBSTETRICS & GYNECOLOGY

## 2019-10-10 PROCEDURE — 99999 PR PBB SHADOW E&M-EST. PATIENT-LVL III: ICD-10-PCS | Mod: PBBFAC,,, | Performed by: OBSTETRICS & GYNECOLOGY

## 2019-10-10 PROCEDURE — 99999 PR PBB SHADOW E&M-EST. PATIENT-LVL III: CPT | Mod: PBBFAC,,, | Performed by: OBSTETRICS & GYNECOLOGY

## 2019-10-10 PROCEDURE — 0502F SUBSEQUENT PRENATAL CARE: CPT | Mod: S$GLB,,, | Performed by: OBSTETRICS & GYNECOLOGY

## 2019-10-10 PROCEDURE — 96372 RHO (D) IMMUNE GLOBULIN: ICD-10-PCS | Mod: 59,S$GLB,, | Performed by: OBSTETRICS & GYNECOLOGY

## 2019-10-10 PROCEDURE — 90471 FLU VACCINE (QUAD) GREATER THAN OR EQUAL TO 3YO PRESERVATIVE FREE IM: ICD-10-PCS | Mod: S$GLB,,, | Performed by: OBSTETRICS & GYNECOLOGY

## 2019-10-10 PROCEDURE — 90686 IIV4 VACC NO PRSV 0.5 ML IM: CPT | Mod: S$GLB,,, | Performed by: OBSTETRICS & GYNECOLOGY

## 2019-10-10 PROCEDURE — 90715 TDAP VACCINE GREATER THAN OR EQUAL TO 7YO IM: ICD-10-PCS | Mod: S$GLB,,, | Performed by: OBSTETRICS & GYNECOLOGY

## 2019-10-10 PROCEDURE — 96372 THER/PROPH/DIAG INJ SC/IM: CPT | Mod: 59,S$GLB,, | Performed by: OBSTETRICS & GYNECOLOGY

## 2019-10-10 PROCEDURE — 90715 TDAP VACCINE 7 YRS/> IM: CPT | Mod: S$GLB,,, | Performed by: OBSTETRICS & GYNECOLOGY

## 2019-10-10 PROCEDURE — 0502F PR SUBSEQUENT PRENATAL CARE: ICD-10-PCS | Mod: S$GLB,,, | Performed by: OBSTETRICS & GYNECOLOGY

## 2019-10-10 PROCEDURE — 90472 IMMUNIZATION ADMIN EACH ADD: CPT | Mod: S$GLB,,, | Performed by: OBSTETRICS & GYNECOLOGY

## 2019-10-10 NOTE — PROGRESS NOTES
HERE for routine OB visit at 31 5/7 wks, with NO complaints.  Denies vaginal bleeding, cramping/ ctx, or LOF.  + FM.  FMC BID.  Size greater than dates.  Rhogam,  Tdap, flu vaccine.  F/U in two weeks.

## 2019-10-10 NOTE — PROGRESS NOTES
After obtaining consent, and per orders of Dr. Mackenzie, injection of Rhogam Lot [id number] 5/30/21 given in the RUOG by Faith Sahu. Patient tolerated well and band aid applied. Patient instructed to remain in clinic for 15 minutes afterwards, and to report any adverse reaction to me immediately.    After obtaining consent, and per orders of Dr. Mackenzie, injection of FLUARIX Lot [id number] Exp 6/30/20 given in the RD by Faith Sahu. Patient tolerated well and band aid applied. Patient instructed to remain in clinic for 15 minutes afterwards, and to report any adverse reaction to me immediately.    After obtaining consent, and per orders of Dr. Mackenzie, injection of TDAP Lot [id number] 5/15/21 given in the LD by Faith Sahu. Patient tolerated well and band aid applied. Patient instructed to remain in clinic for 15 minutes afterwards, and to report any adverse reaction to me immediately.

## 2019-10-11 ENCOUNTER — PROCEDURE VISIT (OUTPATIENT)
Dept: OBSTETRICS AND GYNECOLOGY | Facility: CLINIC | Age: 34
End: 2019-10-11
Payer: COMMERCIAL

## 2019-10-11 DIAGNOSIS — O26.843 SIGNIFICANT DISCREPANCY BETWEEN UTERINE SIZE AND CLINICAL DATES, ANTEPARTUM, THIRD TRIMESTER: ICD-10-CM

## 2019-10-11 DIAGNOSIS — O36.63X0 EXCESSIVE FETAL GROWTH AFFECTING MANAGEMENT OF PREGNANCY IN THIRD TRIMESTER, SINGLE OR UNSPECIFIED FETUS: ICD-10-CM

## 2019-10-11 PROCEDURE — 76816 OB US FOLLOW-UP PER FETUS: CPT | Mod: S$GLB,,, | Performed by: OBSTETRICS & GYNECOLOGY

## 2019-10-11 PROCEDURE — 76816 PR  US,PREGNANT UTERUS,F/U,TRANSABD APP: ICD-10-PCS | Mod: S$GLB,,, | Performed by: OBSTETRICS & GYNECOLOGY

## 2019-10-11 NOTE — PROGRESS NOTES
Normal fluid and growth Obstetrical ultrasound completed today.  See report in imaging section of [facility].

## 2019-10-22 ENCOUNTER — ROUTINE PRENATAL (OUTPATIENT)
Dept: OBSTETRICS AND GYNECOLOGY | Facility: CLINIC | Age: 34
End: 2019-10-22
Payer: COMMERCIAL

## 2019-10-22 VITALS
WEIGHT: 202.38 LBS | DIASTOLIC BLOOD PRESSURE: 62 MMHG | BODY MASS INDEX: 34.13 KG/M2 | SYSTOLIC BLOOD PRESSURE: 112 MMHG

## 2019-10-22 DIAGNOSIS — Z3A.33 33 WEEKS GESTATION OF PREGNANCY: Primary | ICD-10-CM

## 2019-10-22 PROCEDURE — 99999 PR PBB SHADOW E&M-EST. PATIENT-LVL III: ICD-10-PCS | Mod: PBBFAC,,, | Performed by: OBSTETRICS & GYNECOLOGY

## 2019-10-22 PROCEDURE — 0502F PR SUBSEQUENT PRENATAL CARE: ICD-10-PCS | Mod: S$GLB,,, | Performed by: OBSTETRICS & GYNECOLOGY

## 2019-10-22 PROCEDURE — 0502F SUBSEQUENT PRENATAL CARE: CPT | Mod: S$GLB,,, | Performed by: OBSTETRICS & GYNECOLOGY

## 2019-10-22 PROCEDURE — 99999 PR PBB SHADOW E&M-EST. PATIENT-LVL III: CPT | Mod: PBBFAC,,, | Performed by: OBSTETRICS & GYNECOLOGY

## 2019-10-22 NOTE — PROGRESS NOTES
HERE for routine OB visit at 33 3/7 wks, with NO complaints.  Denies vaginal bleeding, cramping/ ctx, or LOF.  + FM.  FMC BID.  Labor precautions.  F/U in 2 wks

## 2019-10-30 ENCOUNTER — PATIENT MESSAGE (OUTPATIENT)
Dept: OBSTETRICS AND GYNECOLOGY | Facility: CLINIC | Age: 34
End: 2019-10-30

## 2019-11-05 ENCOUNTER — ROUTINE PRENATAL (OUTPATIENT)
Dept: OBSTETRICS AND GYNECOLOGY | Facility: CLINIC | Age: 34
End: 2019-11-05
Payer: COMMERCIAL

## 2019-11-05 VITALS
DIASTOLIC BLOOD PRESSURE: 70 MMHG | SYSTOLIC BLOOD PRESSURE: 106 MMHG | BODY MASS INDEX: 34.69 KG/M2 | WEIGHT: 205.69 LBS

## 2019-11-05 DIAGNOSIS — Z3A.35 35 WEEKS GESTATION OF PREGNANCY: Primary | ICD-10-CM

## 2019-11-05 DIAGNOSIS — O34.219 HISTORY OF CESAREAN SECTION COMPLICATING PREGNANCY: ICD-10-CM

## 2019-11-05 PROCEDURE — 87081 CULTURE SCREEN ONLY: CPT

## 2019-11-05 PROCEDURE — 0502F SUBSEQUENT PRENATAL CARE: CPT | Mod: S$GLB,,, | Performed by: OBSTETRICS & GYNECOLOGY

## 2019-11-05 PROCEDURE — 99999 PR PBB SHADOW E&M-EST. PATIENT-LVL III: CPT | Mod: PBBFAC,,, | Performed by: OBSTETRICS & GYNECOLOGY

## 2019-11-05 PROCEDURE — 0502F PR SUBSEQUENT PRENATAL CARE: ICD-10-PCS | Mod: S$GLB,,, | Performed by: OBSTETRICS & GYNECOLOGY

## 2019-11-05 PROCEDURE — 99999 PR PBB SHADOW E&M-EST. PATIENT-LVL III: ICD-10-PCS | Mod: PBBFAC,,, | Performed by: OBSTETRICS & GYNECOLOGY

## 2019-11-05 NOTE — PROGRESS NOTES
HERE for routine OB visit at 36 5/7 wks, with NO complaints.  Denies vaginal bleeding, cramping/ ctx, or LOF.  + FM.  FMC BID.  PTL precautions.  HIV RPR and GBBS . F/U in one week

## 2019-11-08 LAB — BACTERIA SPEC AEROBE CULT: NORMAL

## 2019-11-15 ENCOUNTER — ROUTINE PRENATAL (OUTPATIENT)
Dept: OBSTETRICS AND GYNECOLOGY | Facility: CLINIC | Age: 34
End: 2019-11-15
Payer: COMMERCIAL

## 2019-11-15 VITALS — SYSTOLIC BLOOD PRESSURE: 94 MMHG | DIASTOLIC BLOOD PRESSURE: 70 MMHG | BODY MASS INDEX: 34.91 KG/M2 | WEIGHT: 207 LBS

## 2019-11-15 DIAGNOSIS — Z3A.36 36 WEEKS GESTATION OF PREGNANCY: Primary | ICD-10-CM

## 2019-11-15 PROCEDURE — 99999 PR PBB SHADOW E&M-EST. PATIENT-LVL III: ICD-10-PCS | Mod: PBBFAC,,, | Performed by: OBSTETRICS & GYNECOLOGY

## 2019-11-15 PROCEDURE — 0502F PR SUBSEQUENT PRENATAL CARE: ICD-10-PCS | Mod: S$GLB,,, | Performed by: OBSTETRICS & GYNECOLOGY

## 2019-11-15 PROCEDURE — 0502F SUBSEQUENT PRENATAL CARE: CPT | Mod: S$GLB,,, | Performed by: OBSTETRICS & GYNECOLOGY

## 2019-11-15 PROCEDURE — 99999 PR PBB SHADOW E&M-EST. PATIENT-LVL III: CPT | Mod: PBBFAC,,, | Performed by: OBSTETRICS & GYNECOLOGY

## 2019-11-15 NOTE — PROGRESS NOTES
HERE for routine OB visit at 36 6/7 wks, with NO complaints.  Denies vaginal bleeding, cramping/ ctx, or LOF.  + FM.  FMC BID. NEG GBBS.  HIV RPR before next week's visit

## 2019-11-19 ENCOUNTER — ROUTINE PRENATAL (OUTPATIENT)
Dept: OBSTETRICS AND GYNECOLOGY | Facility: CLINIC | Age: 34
End: 2019-11-19
Payer: COMMERCIAL

## 2019-11-19 ENCOUNTER — CLINICAL SUPPORT (OUTPATIENT)
Dept: INTERNAL MEDICINE | Facility: CLINIC | Age: 34
End: 2019-11-19
Payer: COMMERCIAL

## 2019-11-19 VITALS
SYSTOLIC BLOOD PRESSURE: 116 MMHG | WEIGHT: 208.31 LBS | BODY MASS INDEX: 35.14 KG/M2 | DIASTOLIC BLOOD PRESSURE: 70 MMHG

## 2019-11-19 DIAGNOSIS — Z3A.35 35 WEEKS GESTATION OF PREGNANCY: ICD-10-CM

## 2019-11-19 DIAGNOSIS — Z3A.37 37 WEEKS GESTATION OF PREGNANCY: Primary | ICD-10-CM

## 2019-11-19 DIAGNOSIS — O34.219 HISTORY OF CESAREAN SECTION COMPLICATING PREGNANCY: ICD-10-CM

## 2019-11-19 LAB
BASOPHILS # BLD AUTO: 0.03 K/UL (ref 0–0.2)
BASOPHILS NFR BLD: 0.2 % (ref 0–1.9)
DIFFERENTIAL METHOD: ABNORMAL
EOSINOPHIL # BLD AUTO: 0 K/UL (ref 0–0.5)
EOSINOPHIL NFR BLD: 0.3 % (ref 0–8)
ERYTHROCYTE [DISTWIDTH] IN BLOOD BY AUTOMATED COUNT: 13.1 % (ref 11.5–14.5)
HCT VFR BLD AUTO: 37.9 % (ref 37–48.5)
HGB BLD-MCNC: 12.5 G/DL (ref 12–16)
IMM GRANULOCYTES # BLD AUTO: 0.1 K/UL (ref 0–0.04)
IMM GRANULOCYTES NFR BLD AUTO: 0.8 % (ref 0–0.5)
LYMPHOCYTES # BLD AUTO: 2.4 K/UL (ref 1–4.8)
LYMPHOCYTES NFR BLD: 18.8 % (ref 18–48)
MCH RBC QN AUTO: 30 PG (ref 27–31)
MCHC RBC AUTO-ENTMCNC: 33 G/DL (ref 32–36)
MCV RBC AUTO: 91 FL (ref 82–98)
MONOCYTES # BLD AUTO: 0.8 K/UL (ref 0.3–1)
MONOCYTES NFR BLD: 6.2 % (ref 4–15)
NEUTROPHILS # BLD AUTO: 9.2 K/UL (ref 1.8–7.7)
NEUTROPHILS NFR BLD: 73.7 % (ref 38–73)
NRBC BLD-RTO: 0 /100 WBC
PLATELET # BLD AUTO: 291 K/UL (ref 150–350)
PMV BLD AUTO: 10.6 FL (ref 9.2–12.9)
RBC # BLD AUTO: 4.16 M/UL (ref 4–5.4)
WBC # BLD AUTO: 12.51 K/UL (ref 3.9–12.7)

## 2019-11-19 PROCEDURE — 99999 PR PBB SHADOW E&M-EST. PATIENT-LVL III: ICD-10-PCS | Mod: PBBFAC,,, | Performed by: OBSTETRICS & GYNECOLOGY

## 2019-11-19 PROCEDURE — 0502F SUBSEQUENT PRENATAL CARE: CPT | Mod: S$GLB,,, | Performed by: OBSTETRICS & GYNECOLOGY

## 2019-11-19 PROCEDURE — 99999 PR PBB SHADOW E&M-EST. PATIENT-LVL III: CPT | Mod: PBBFAC,,, | Performed by: OBSTETRICS & GYNECOLOGY

## 2019-11-19 PROCEDURE — 86703 HIV-1/HIV-2 1 RESULT ANTBDY: CPT

## 2019-11-19 PROCEDURE — 85025 COMPLETE CBC W/AUTO DIFF WBC: CPT

## 2019-11-19 PROCEDURE — 0502F PR SUBSEQUENT PRENATAL CARE: ICD-10-PCS | Mod: S$GLB,,, | Performed by: OBSTETRICS & GYNECOLOGY

## 2019-11-19 PROCEDURE — 86592 SYPHILIS TEST NON-TREP QUAL: CPT

## 2019-11-19 NOTE — PROGRESS NOTES
HERE for routine OB visit at 37 3/7 wks, with NO complaints.  Denies vaginal bleeding, cramping/ ctx, or LOF.  + FM.  FMC BID.   Wants to plan for repeat CS  F/U 1 wk

## 2019-11-20 LAB
HIV 1+2 AB+HIV1 P24 AG SERPL QL IA: NEGATIVE
RPR SER QL: NORMAL

## 2019-11-26 ENCOUNTER — ROUTINE PRENATAL (OUTPATIENT)
Dept: OBSTETRICS AND GYNECOLOGY | Facility: CLINIC | Age: 34
End: 2019-11-26
Payer: COMMERCIAL

## 2019-11-26 VITALS
DIASTOLIC BLOOD PRESSURE: 76 MMHG | SYSTOLIC BLOOD PRESSURE: 106 MMHG | WEIGHT: 208.75 LBS | BODY MASS INDEX: 35.21 KG/M2

## 2019-11-26 DIAGNOSIS — Z3A.38 38 WEEKS GESTATION OF PREGNANCY: Primary | ICD-10-CM

## 2019-11-26 PROCEDURE — 99999 PR PBB SHADOW E&M-EST. PATIENT-LVL III: CPT | Mod: PBBFAC,,, | Performed by: OBSTETRICS & GYNECOLOGY

## 2019-11-26 PROCEDURE — 0502F PR SUBSEQUENT PRENATAL CARE: ICD-10-PCS | Mod: S$GLB,,, | Performed by: OBSTETRICS & GYNECOLOGY

## 2019-11-26 PROCEDURE — 99999 PR PBB SHADOW E&M-EST. PATIENT-LVL III: ICD-10-PCS | Mod: PBBFAC,,, | Performed by: OBSTETRICS & GYNECOLOGY

## 2019-11-26 PROCEDURE — 0502F SUBSEQUENT PRENATAL CARE: CPT | Mod: S$GLB,,, | Performed by: OBSTETRICS & GYNECOLOGY

## 2019-11-26 NOTE — PROGRESS NOTES
HERE for routine OB visit at 38 3/7 wks, with NO complaints.  Denies vaginal bleeding, cramping/ ctx, or LOF.  + FM.  FMC BID.   Labor precautions.  Will plan for repeat CS next week. NPO for 8 hours.

## 2019-12-03 ENCOUNTER — PATIENT MESSAGE (OUTPATIENT)
Dept: OBSTETRICS AND GYNECOLOGY | Facility: OTHER | Age: 34
End: 2019-12-03

## 2019-12-03 ENCOUNTER — TELEPHONE (OUTPATIENT)
Dept: OBSTETRICS AND GYNECOLOGY | Facility: CLINIC | Age: 34
End: 2019-12-03

## 2019-12-03 DIAGNOSIS — Z3A.39 39 WEEKS GESTATION OF PREGNANCY: Primary | ICD-10-CM

## 2019-12-04 ENCOUNTER — LAB VISIT (OUTPATIENT)
Dept: LAB | Facility: OTHER | Age: 34
End: 2019-12-04
Attending: OBSTETRICS & GYNECOLOGY
Payer: COMMERCIAL

## 2019-12-04 ENCOUNTER — ANESTHESIA EVENT (OUTPATIENT)
Dept: OBSTETRICS AND GYNECOLOGY | Facility: OTHER | Age: 34
End: 2019-12-04
Payer: COMMERCIAL

## 2019-12-04 DIAGNOSIS — Z3A.39 39 WEEKS GESTATION OF PREGNANCY: ICD-10-CM

## 2019-12-04 LAB
ABO + RH BLD: NORMAL
BASOPHILS # BLD AUTO: 0.03 K/UL (ref 0–0.2)
BASOPHILS NFR BLD: 0.2 % (ref 0–1.9)
BLD GP AB SCN CELLS X3 SERPL QL: NORMAL
DIFFERENTIAL METHOD: ABNORMAL
EOSINOPHIL # BLD AUTO: 0 K/UL (ref 0–0.5)
EOSINOPHIL NFR BLD: 0.3 % (ref 0–8)
ERYTHROCYTE [DISTWIDTH] IN BLOOD BY AUTOMATED COUNT: 13.2 % (ref 11.5–14.5)
HCT VFR BLD AUTO: 40.6 % (ref 37–48.5)
HGB BLD-MCNC: 13.4 G/DL (ref 12–16)
IMM GRANULOCYTES # BLD AUTO: 0.07 K/UL (ref 0–0.04)
IMM GRANULOCYTES NFR BLD AUTO: 0.5 % (ref 0–0.5)
LYMPHOCYTES # BLD AUTO: 2.3 K/UL (ref 1–4.8)
LYMPHOCYTES NFR BLD: 16.5 % (ref 18–48)
MCH RBC QN AUTO: 30.5 PG (ref 27–31)
MCHC RBC AUTO-ENTMCNC: 33 G/DL (ref 32–36)
MCV RBC AUTO: 92 FL (ref 82–98)
MONOCYTES # BLD AUTO: 0.8 K/UL (ref 0.3–1)
MONOCYTES NFR BLD: 5.6 % (ref 4–15)
NEUTROPHILS # BLD AUTO: 10.5 K/UL (ref 1.8–7.7)
NEUTROPHILS NFR BLD: 76.9 % (ref 38–73)
NRBC BLD-RTO: 0 /100 WBC
PLATELET # BLD AUTO: 294 K/UL (ref 150–350)
PMV BLD AUTO: 9.8 FL (ref 9.2–12.9)
RBC # BLD AUTO: 4.4 M/UL (ref 4–5.4)
WBC # BLD AUTO: 13.66 K/UL (ref 3.9–12.7)

## 2019-12-04 PROCEDURE — 36415 COLL VENOUS BLD VENIPUNCTURE: CPT

## 2019-12-04 PROCEDURE — 86850 RBC ANTIBODY SCREEN: CPT

## 2019-12-04 PROCEDURE — 85025 COMPLETE CBC W/AUTO DIFF WBC: CPT

## 2019-12-05 ENCOUNTER — RESEARCH ENCOUNTER (OUTPATIENT)
Dept: RESEARCH | Facility: HOSPITAL | Age: 34
End: 2019-12-05

## 2019-12-05 ENCOUNTER — ANESTHESIA (OUTPATIENT)
Dept: OBSTETRICS AND GYNECOLOGY | Facility: OTHER | Age: 34
End: 2019-12-05
Payer: COMMERCIAL

## 2019-12-05 ENCOUNTER — HOSPITAL ENCOUNTER (INPATIENT)
Facility: OTHER | Age: 34
LOS: 3 days | Discharge: HOME | End: 2019-12-08
Attending: OBSTETRICS & GYNECOLOGY | Admitting: OBSTETRICS & GYNECOLOGY
Payer: COMMERCIAL

## 2019-12-05 DIAGNOSIS — Z3A.39 39 WEEKS GESTATION OF PREGNANCY: ICD-10-CM

## 2019-12-05 PROBLEM — O34.219 HISTORY OF CESAREAN SECTION COMPLICATING PREGNANCY: Status: ACTIVE | Noted: 2019-12-05

## 2019-12-05 LAB
BASOPHILS # BLD AUTO: 0.05 K/UL (ref 0–0.2)
BASOPHILS NFR BLD: 0.3 % (ref 0–1.9)
DIFFERENTIAL METHOD: ABNORMAL
EOSINOPHIL # BLD AUTO: 0 K/UL (ref 0–0.5)
EOSINOPHIL NFR BLD: 0.2 % (ref 0–8)
ERYTHROCYTE [DISTWIDTH] IN BLOOD BY AUTOMATED COUNT: 13.3 % (ref 11.5–14.5)
HCT VFR BLD AUTO: 34.7 % (ref 37–48.5)
HGB BLD-MCNC: 11.8 G/DL (ref 12–16)
IMM GRANULOCYTES # BLD AUTO: 0.11 K/UL (ref 0–0.04)
IMM GRANULOCYTES NFR BLD AUTO: 0.6 % (ref 0–0.5)
LYMPHOCYTES # BLD AUTO: 3.2 K/UL (ref 1–4.8)
LYMPHOCYTES NFR BLD: 16.3 % (ref 18–48)
MCH RBC QN AUTO: 30.8 PG (ref 27–31)
MCHC RBC AUTO-ENTMCNC: 34 G/DL (ref 32–36)
MCV RBC AUTO: 91 FL (ref 82–98)
MONOCYTES # BLD AUTO: 1.3 K/UL (ref 0.3–1)
MONOCYTES NFR BLD: 6.7 % (ref 4–15)
NEUTROPHILS # BLD AUTO: 14.8 K/UL (ref 1.8–7.7)
NEUTROPHILS NFR BLD: 75.9 % (ref 38–73)
NRBC BLD-RTO: 0 /100 WBC
PLATELET # BLD AUTO: 267 K/UL (ref 150–350)
PMV BLD AUTO: 9.5 FL (ref 9.2–12.9)
RBC # BLD AUTO: 3.83 M/UL (ref 4–5.4)
WBC # BLD AUTO: 19.42 K/UL (ref 3.9–12.7)

## 2019-12-05 PROCEDURE — 76815 OB US LIMITED FETUS(S): CPT

## 2019-12-05 PROCEDURE — 36415 COLL VENOUS BLD VENIPUNCTURE: CPT

## 2019-12-05 PROCEDURE — 25000003 PHARM REV CODE 250

## 2019-12-05 PROCEDURE — 85025 COMPLETE CBC W/AUTO DIFF WBC: CPT

## 2019-12-05 PROCEDURE — 63600175 PHARM REV CODE 636 W HCPCS: Performed by: STUDENT IN AN ORGANIZED HEALTH CARE EDUCATION/TRAINING PROGRAM

## 2019-12-05 PROCEDURE — 59510 CESAREAN DELIVERY: CPT | Mod: ,,, | Performed by: ANESTHESIOLOGY

## 2019-12-05 PROCEDURE — 25000003 PHARM REV CODE 250: Performed by: STUDENT IN AN ORGANIZED HEALTH CARE EDUCATION/TRAINING PROGRAM

## 2019-12-05 PROCEDURE — 86850 RBC ANTIBODY SCREEN: CPT

## 2019-12-05 PROCEDURE — 37000008 HC ANESTHESIA 1ST 15 MINUTES: Performed by: OBSTETRICS & GYNECOLOGY

## 2019-12-05 PROCEDURE — 59510 CESAREAN DELIVERY: CPT | Mod: ,,, | Performed by: OBSTETRICS & GYNECOLOGY

## 2019-12-05 PROCEDURE — 36004725 HC OB OR TIME LEV III - EA ADD 15 MIN: Performed by: OBSTETRICS & GYNECOLOGY

## 2019-12-05 PROCEDURE — 11000001 HC ACUTE MED/SURG PRIVATE ROOM

## 2019-12-05 PROCEDURE — 71000033 HC RECOVERY, INTIAL HOUR: Performed by: OBSTETRICS & GYNECOLOGY

## 2019-12-05 PROCEDURE — 71000039 HC RECOVERY, EACH ADD'L HOUR: Performed by: OBSTETRICS & GYNECOLOGY

## 2019-12-05 PROCEDURE — S0028 INJECTION, FAMOTIDINE, 20 MG: HCPCS

## 2019-12-05 PROCEDURE — 59510 PRA FULL ROUT OBSTE CARE,CESAREAN DELIV: ICD-10-PCS | Mod: ,,, | Performed by: ANESTHESIOLOGY

## 2019-12-05 PROCEDURE — 59510 PR FULL ROUT OBSTE CARE,CESAREAN DELIV: ICD-10-PCS | Mod: ,,, | Performed by: OBSTETRICS & GYNECOLOGY

## 2019-12-05 PROCEDURE — 51702 INSERT TEMP BLADDER CATH: CPT

## 2019-12-05 PROCEDURE — 36004724 HC OB OR TIME LEV III - 1ST 15 MIN: Performed by: OBSTETRICS & GYNECOLOGY

## 2019-12-05 PROCEDURE — 85461 HEMOGLOBIN FETAL: CPT

## 2019-12-05 PROCEDURE — 37000009 HC ANESTHESIA EA ADD 15 MINS: Performed by: OBSTETRICS & GYNECOLOGY

## 2019-12-05 PROCEDURE — 27200688 HC TRAY, SPINAL-HYPER/ ISOBARIC: Performed by: STUDENT IN AN ORGANIZED HEALTH CARE EDUCATION/TRAINING PROGRAM

## 2019-12-05 RX ORDER — SODIUM CHLORIDE, SODIUM LACTATE, POTASSIUM CHLORIDE, CALCIUM CHLORIDE 600; 310; 30; 20 MG/100ML; MG/100ML; MG/100ML; MG/100ML
INJECTION, SOLUTION INTRAVENOUS CONTINUOUS
Status: DISCONTINUED | OUTPATIENT
Start: 2019-12-05 | End: 2019-12-08

## 2019-12-05 RX ORDER — ONDANSETRON 8 MG/1
8 TABLET, ORALLY DISINTEGRATING ORAL EVERY 8 HOURS PRN
Status: DISCONTINUED | OUTPATIENT
Start: 2019-12-05 | End: 2019-12-08 | Stop reason: HOSPADM

## 2019-12-05 RX ORDER — DIPHENHYDRAMINE HYDROCHLORIDE 50 MG/ML
12.5 INJECTION INTRAMUSCULAR; INTRAVENOUS
Status: DISCONTINUED | OUTPATIENT
Start: 2019-12-05 | End: 2019-12-08 | Stop reason: HOSPADM

## 2019-12-05 RX ORDER — OXYTOCIN/RINGER'S LACTATE 30/500 ML
95 PLASTIC BAG, INJECTION (ML) INTRAVENOUS ONCE
Status: COMPLETED | OUTPATIENT
Start: 2019-12-05 | End: 2019-12-05

## 2019-12-05 RX ORDER — ONDANSETRON 2 MG/ML
4 INJECTION INTRAMUSCULAR; INTRAVENOUS EVERY 6 HOURS PRN
Status: DISCONTINUED | OUTPATIENT
Start: 2019-12-05 | End: 2019-12-08 | Stop reason: HOSPADM

## 2019-12-05 RX ORDER — OXYTOCIN/RINGER'S LACTATE 30/500 ML
334 PLASTIC BAG, INJECTION (ML) INTRAVENOUS ONCE
Status: DISCONTINUED | OUTPATIENT
Start: 2019-12-05 | End: 2019-12-08

## 2019-12-05 RX ORDER — PRENATAL WITH FERROUS FUM AND FOLIC ACID 3080; 920; 120; 400; 22; 1.84; 3; 20; 10; 1; 12; 200; 27; 25; 2 [IU]/1; [IU]/1; MG/1; [IU]/1; MG/1; MG/1; MG/1; MG/1; MG/1; MG/1; UG/1; MG/1; MG/1; MG/1; MG/1
1 TABLET ORAL DAILY
Status: DISCONTINUED | OUTPATIENT
Start: 2019-12-05 | End: 2019-12-08 | Stop reason: HOSPADM

## 2019-12-05 RX ORDER — FENTANYL CITRATE 50 UG/ML
INJECTION, SOLUTION INTRAMUSCULAR; INTRAVENOUS
Status: DISCONTINUED | OUTPATIENT
Start: 2019-12-05 | End: 2019-12-05

## 2019-12-05 RX ORDER — FAMOTIDINE 10 MG/ML
20 INJECTION INTRAVENOUS
Status: COMPLETED | OUTPATIENT
Start: 2019-12-05 | End: 2019-12-05

## 2019-12-05 RX ORDER — ACETAMINOPHEN 325 MG/1
650 TABLET ORAL
Status: DISCONTINUED | OUTPATIENT
Start: 2019-12-05 | End: 2019-12-08 | Stop reason: HOSPADM

## 2019-12-05 RX ORDER — CEFAZOLIN SODIUM 2 G/50ML
2 SOLUTION INTRAVENOUS
Status: DISCONTINUED | OUTPATIENT
Start: 2019-12-05 | End: 2019-12-08

## 2019-12-05 RX ORDER — KETOROLAC TROMETHAMINE 30 MG/ML
30 INJECTION, SOLUTION INTRAMUSCULAR; INTRAVENOUS
Status: COMPLETED | OUTPATIENT
Start: 2019-12-05 | End: 2019-12-06

## 2019-12-05 RX ORDER — ACETAMINOPHEN 500 MG
TABLET ORAL
Status: DISPENSED
Start: 2019-12-05 | End: 2019-12-05

## 2019-12-05 RX ORDER — SIMETHICONE 80 MG
1 TABLET,CHEWABLE ORAL EVERY 6 HOURS PRN
Status: DISCONTINUED | OUTPATIENT
Start: 2019-12-05 | End: 2019-12-08 | Stop reason: HOSPADM

## 2019-12-05 RX ORDER — AMOXICILLIN 250 MG
1 CAPSULE ORAL NIGHTLY PRN
Status: DISCONTINUED | OUTPATIENT
Start: 2019-12-05 | End: 2019-12-08 | Stop reason: HOSPADM

## 2019-12-05 RX ORDER — CEFAZOLIN SODIUM 1 G/3ML
INJECTION, POWDER, FOR SOLUTION INTRAMUSCULAR; INTRAVENOUS
Status: DISCONTINUED | OUTPATIENT
Start: 2019-12-05 | End: 2019-12-05

## 2019-12-05 RX ORDER — HYDROCORTISONE 25 MG/G
CREAM TOPICAL 3 TIMES DAILY PRN
Status: DISCONTINUED | OUTPATIENT
Start: 2019-12-05 | End: 2019-12-08 | Stop reason: HOSPADM

## 2019-12-05 RX ORDER — OXYCODONE HYDROCHLORIDE 5 MG/1
5 TABLET ORAL EVERY 4 HOURS PRN
Status: DISCONTINUED | OUTPATIENT
Start: 2019-12-05 | End: 2019-12-08 | Stop reason: HOSPADM

## 2019-12-05 RX ORDER — MISOPROSTOL 200 UG/1
800 TABLET ORAL ONCE AS NEEDED
Status: DISCONTINUED | OUTPATIENT
Start: 2019-12-05 | End: 2019-12-08 | Stop reason: HOSPADM

## 2019-12-05 RX ORDER — DOCUSATE SODIUM 100 MG/1
200 CAPSULE, LIQUID FILLED ORAL 2 TIMES DAILY
Status: DISCONTINUED | OUTPATIENT
Start: 2019-12-05 | End: 2019-12-08 | Stop reason: HOSPADM

## 2019-12-05 RX ORDER — SODIUM CITRATE AND CITRIC ACID MONOHYDRATE 334; 500 MG/5ML; MG/5ML
30 SOLUTION ORAL
Status: COMPLETED | OUTPATIENT
Start: 2019-12-05 | End: 2019-12-05

## 2019-12-05 RX ORDER — CARBOPROST TROMETHAMINE 250 UG/ML
250 INJECTION, SOLUTION INTRAMUSCULAR
Status: DISCONTINUED | OUTPATIENT
Start: 2019-12-05 | End: 2019-12-08 | Stop reason: HOSPADM

## 2019-12-05 RX ORDER — METHYLERGONOVINE MALEATE 0.2 MG/ML
200 INJECTION INTRAVENOUS ONCE AS NEEDED
Status: DISCONTINUED | OUTPATIENT
Start: 2019-12-05 | End: 2019-12-08 | Stop reason: HOSPADM

## 2019-12-05 RX ORDER — ONDANSETRON 2 MG/ML
INJECTION INTRAMUSCULAR; INTRAVENOUS
Status: DISCONTINUED | OUTPATIENT
Start: 2019-12-05 | End: 2019-12-05

## 2019-12-05 RX ORDER — OXYCODONE HYDROCHLORIDE 5 MG/1
10 TABLET ORAL EVERY 4 HOURS PRN
Status: DISCONTINUED | OUTPATIENT
Start: 2019-12-05 | End: 2019-12-08 | Stop reason: HOSPADM

## 2019-12-05 RX ORDER — FAMOTIDINE 10 MG/ML
INJECTION INTRAVENOUS
Status: COMPLETED
Start: 2019-12-05 | End: 2019-12-05

## 2019-12-05 RX ORDER — SODIUM CITRATE AND CITRIC ACID MONOHYDRATE 334; 500 MG/5ML; MG/5ML
SOLUTION ORAL
Status: DISPENSED
Start: 2019-12-05 | End: 2019-12-05

## 2019-12-05 RX ORDER — PHENYLEPHRINE HYDROCHLORIDE 10 MG/ML
INJECTION INTRAVENOUS
Status: DISCONTINUED | OUTPATIENT
Start: 2019-12-05 | End: 2019-12-05

## 2019-12-05 RX ORDER — SODIUM CHLORIDE, SODIUM LACTATE, POTASSIUM CHLORIDE, CALCIUM CHLORIDE 600; 310; 30; 20 MG/100ML; MG/100ML; MG/100ML; MG/100ML
INJECTION, SOLUTION INTRAVENOUS CONTINUOUS
Status: DISCONTINUED | OUTPATIENT
Start: 2019-12-05 | End: 2019-12-08 | Stop reason: HOSPADM

## 2019-12-05 RX ORDER — DIPHENHYDRAMINE HCL 25 MG
25 CAPSULE ORAL EVERY 6 HOURS PRN
Status: DISCONTINUED | OUTPATIENT
Start: 2019-12-05 | End: 2019-12-08 | Stop reason: HOSPADM

## 2019-12-05 RX ORDER — ACETAMINOPHEN 500 MG
1000 TABLET ORAL
Status: COMPLETED | OUTPATIENT
Start: 2019-12-05 | End: 2019-12-05

## 2019-12-05 RX ORDER — NALBUPHINE HYDROCHLORIDE 10 MG/ML
5 INJECTION, SOLUTION INTRAMUSCULAR; INTRAVENOUS; SUBCUTANEOUS ONCE AS NEEDED
Status: DISCONTINUED | OUTPATIENT
Start: 2019-12-05 | End: 2019-12-08 | Stop reason: HOSPADM

## 2019-12-05 RX ORDER — MORPHINE SULFATE 0.5 MG/ML
INJECTION, SOLUTION EPIDURAL; INTRATHECAL; INTRAVENOUS
Status: DISCONTINUED | OUTPATIENT
Start: 2019-12-05 | End: 2019-12-05

## 2019-12-05 RX ORDER — IBUPROFEN 400 MG/1
800 TABLET ORAL
Status: DISCONTINUED | OUTPATIENT
Start: 2019-12-06 | End: 2019-12-08 | Stop reason: HOSPADM

## 2019-12-05 RX ORDER — DEXAMETHASONE SODIUM PHOSPHATE 4 MG/ML
INJECTION, SOLUTION INTRA-ARTICULAR; INTRALESIONAL; INTRAMUSCULAR; INTRAVENOUS; SOFT TISSUE
Status: DISCONTINUED | OUTPATIENT
Start: 2019-12-05 | End: 2019-12-05

## 2019-12-05 RX ADMIN — DOCUSATE SODIUM 200 MG: 100 CAPSULE, LIQUID FILLED ORAL at 08:12

## 2019-12-05 RX ADMIN — FENTANYL CITRATE 10 MCG: 50 INJECTION, SOLUTION INTRAMUSCULAR; INTRAVENOUS at 07:12

## 2019-12-05 RX ADMIN — ONDANSETRON 4 MG: 2 INJECTION INTRAMUSCULAR; INTRAVENOUS at 07:12

## 2019-12-05 RX ADMIN — PHENYLEPHRINE HYDROCHLORIDE 50 MCG/MIN: 10 INJECTION INTRAVENOUS at 07:12

## 2019-12-05 RX ADMIN — FAMOTIDINE 20 MG: 10 INJECTION INTRAVENOUS at 07:12

## 2019-12-05 RX ADMIN — PHENYLEPHRINE HYDROCHLORIDE 100 MCG: 10 INJECTION INTRAVENOUS at 07:12

## 2019-12-05 RX ADMIN — KETOROLAC TROMETHAMINE 30 MG: 30 INJECTION, SOLUTION INTRAMUSCULAR; INTRAVENOUS at 08:12

## 2019-12-05 RX ADMIN — KETOROLAC TROMETHAMINE 30 MG: 30 INJECTION, SOLUTION INTRAMUSCULAR; INTRAVENOUS at 03:12

## 2019-12-05 RX ADMIN — ACETAMINOPHEN 650 MG: 325 TABLET, FILM COATED ORAL at 06:12

## 2019-12-05 RX ADMIN — Medication 0.1 MG: at 07:12

## 2019-12-05 RX ADMIN — SODIUM CITRATE AND CITRIC ACID MONOHYDRATE 30 ML: 500; 334 SOLUTION ORAL at 07:12

## 2019-12-05 RX ADMIN — CEFAZOLIN 2 G: 330 INJECTION, POWDER, FOR SOLUTION INTRAMUSCULAR; INTRAVENOUS at 07:12

## 2019-12-05 RX ADMIN — Medication 95 MILLI-UNITS/MIN: at 09:12

## 2019-12-05 RX ADMIN — PHENYLEPHRINE HYDROCHLORIDE 100 MCG: 10 INJECTION INTRAVENOUS at 08:12

## 2019-12-05 RX ADMIN — KETOROLAC TROMETHAMINE 30 MG: 30 INJECTION, SOLUTION INTRAMUSCULAR; INTRAVENOUS at 09:12

## 2019-12-05 RX ADMIN — ACETAMINOPHEN 1000 MG: 500 TABLET ORAL at 07:12

## 2019-12-05 RX ADMIN — ACETAMINOPHEN 650 MG: 325 TABLET, FILM COATED ORAL at 01:12

## 2019-12-05 RX ADMIN — DEXAMETHASONE SODIUM PHOSPHATE 4 MG: 4 INJECTION, SOLUTION INTRAMUSCULAR; INTRAVENOUS at 07:12

## 2019-12-05 RX ADMIN — FAMOTIDINE 20 MG: 10 INJECTION, SOLUTION INTRAVENOUS at 07:12

## 2019-12-05 NOTE — L&D DELIVERY NOTE
[facility]   Section   Operative Note    Procedure Date: 2019  Procedure: Low transverse  delivery via Pfannenstiel incision    Indications: History of LTCS    Pre-operative Diagnosis:   1. IUP at 39w5d gestational age  2. History of LTCS  3. IVF pregnancy  4. Rh negative    Post-operative Diagnosis: Same    Surgeon: Dr. Lizzie Mackenzie MD  Assistants: Dr. Robin Guevara, PGY-2    Anesthesia: Combined spinal/epidural anesthesia    Findings:   1. IUP, fetus in the vertex position  2. Single viable male infant delivered via low transverse uterine incision  3. Umbilical cord blood obtained, placenta delivered with uterine massage and was discarded  4. Normal-appearing uterus, bilateral tubes, and ovaries    Specimens:   1. Single viable male infant  2. Placenta (discarded)    Estimated Blood Loss: 500 mL  Total IV Fluids: 1200 mL  UOP: 300 mL    Complications:  None  Condition: Stable  Disposition: Recovery room           Procedure in Detail:   In the preoperative holding area, the risks, benefits, complications, treatment options, and expected outcomes were discussed with the patient. Questions were answered, she voiced understanding, and gave verbal and written informed consent for  delivery as well as blood transfusion if necessary. Interval history and exam of the surgery site were done. Fetal presentation was confirmed and vertex and FHR tracing was noted to be category one.   The patient was taken to the operating room. After induction of combined spinal/epidural anesthesia, the patient was placed in the dorsal supine position with a left lateral tilt, and a hernandez catheter was placed in the bladder. She was prepped and draped in a sterile fashion. Preoperative antibiotics were given and an allis test confirmed adequate anesthesia. A Pfannenstiel incision was made and carried down to the fascia, which was then incised with a scalpel and extended laterally with curved  rand scissors. The fascia was grasped with Kocher clamps and  from the underlying rectus tissue superiorly then inferiorly. The peritoneum was identified, found to be free of adherent bowel, and was entered. Peritoneal incision was extended vertically with blunt traction. The bladder blade was inserted to keep the bladder below the operating field and a bladder flap was created. A low transverse uterine incision was made with the scalpel and extended horizontally with traction in the cephalocaudad direction. The amniotic sac was ruptured, and the fetal head was elevated out of the pelvis. The patient delivered a single viable male infant without difficulty. Infant weighed 3900 grams with Apgar scores of 9 and 9 at one and five minutes respectively. The oropharynx and nares of the infant were suctioned, and the umbilical cord was clamped and cut. The infant was handed to the NICU team. Umbilical cord blood was drawn. The placenta was delivered intact, appeared normal, and was discarded. The uterus was exteriorized and the uterine cavity was swept with a clean lap. The uterine incision was closed with running locked sutures of  #1 chromic. Tension was released from the uterus and the uterine incision was examined and was noted to be hemostatic. The uterine outline, tubes and ovaries were identified and appeared normal. The posterior cul de sac was blotted with a clean lap and the uterus was returned to the abdominal cavity. The uterine incision was reinspected and good hemostasis was noted. The abdominal cavity was irrigated to remove clots. The peritoneum was reapproximated with 2-0 Vicryl in a running fashion, then the rectus muscles were brought together with #1 chromic in mattress sutures sutures. The fascia was closed with running sutures of 0-PDS. The subcutaneous fat layer was approximately 3 centimeters deep, and was approximated with running sutures in 2-0 vicryl. The skin incision was closed with  4-0 Monocryl in a running fashion and the area was cleaned and a sterile dressing was placed.    Instrument, sponge, and needle counts were correct prior the abdominal closure and at the conclusion of the case.     Charis Wells tolerated procedure well and was transferred to the recovery room in good condition.    Robin Guevara M.D.  Obstetrics & Gynecology  PGY3     Delivery Information for Kashif Wells    Birth information:  YOB: 2019   Time of birth: 7:45 AM   Sex: male   Head Delivery Date/Time: 2019  7:45 AM   Delivery type: , Low Transverse   Gestational Age: 39w5d    Delivery Providers    Delivering clinician:  Lizzie Mackenzie MD   Provider Role    MD Keegan Tapia RN Betty T Parker Lauren Griffiths, RN         Measurements    Weight:  3900 g  Length:         Apgars    Living status:  Living  Apgars:   1 min.:   5 min.:   10 min.:   15 min.:   20 min.:     Skin color:   1  1       Heart rate:   2  2       Reflex irritability:   2  2       Muscle tone:   2  2       Respiratory effort:   2  2       Total:   9  9       Apgars assigned by:  CHUNG DE       Operative Delivery    Forceps attempted?:  No  Vacuum extractor attempted?:  No       Shoulder Dystocia    Shoulder dystocia present?:  No       Presentation    Presentation:  Vertex  Position:  Occiput Anterior       Interventions/Resuscitation    Method:  Bulb Suctioning, Tactile Stimulation       Cord    Vessels:  3 vessels  Complications:  None  Delayed Cord Clamping?:  No  Cord Blood Disposition:  Sent with Baby  Gases Sent?:  No  Stem Cell Collection (by MD):  No       Placenta    Placenta delivery date/time:  2019 0747  Placenta removal:  Spontaneous  Placenta appearance:  Intact  Placenta disposition:  discarded       Labor Events:       labor:       Labor Onset Date/Time:         Dilation Complete Date/Time:         Start Pushing Date/Time:         Start Pushing  Date/Time:       Rupture Date/Time:              Rupture type:           Fluid Amount:        Fluid Color:        Fluid Odor:        Membrane Status:                 steroids:       Antibiotics given for GBS:       Induction:       Indications for induction:        Augmentation:       Indications for augmentation:       Labor complications:       Additional complications:          Cervical ripening:                     Delivery:      Episiotomy:       Indication for Episiotomy:       Perineal Lacerations:   Repaired:      Periurethral Laceration:   Repaired:     Labial Laceration:   Repaired:     Sulcus Laceration:   Repaired:     Vaginal Laceration:   Repaired:     Cervical Laceration:   Repaired:     Repair suture:       Repair # of packets:       Last Value - EBL - Nursing (mL):       Sum - EBL - Nursing (mL): 0     Last Value - EBL - Anesthesia (mL):      Calculated QBL (mL): 530      Vaginal Sweep Performed:       Surgicount Correct:         Other providers:       Anesthesia    Method:  Spinal          Details (if applicable):  Trial of Labor No    Categorization: Repeat    Priority: Routine   Indications for : Repeat Section   Incision Type: low transverse     Additional  information:  Forceps:    Vacuum:    Breech:    Observed anomalies    Other (Comments):

## 2019-12-05 NOTE — ANESTHESIA PREPROCEDURE EVALUATION
2019  [facility]  Anesthesia Pre-Operative Evaluation         Patient Name: Charis Wells  YOB: 1985  MRN: [id number]    SUBJECTIVE:     Pre-operative evaluation for Procedure(s) (LRB):   SECTION (N/A)     2019    Charis Wells is a 34 y.o. female w/ previous c/s 2/2 failure to progress now here for scheduled c/s      LDA: None documented.    Prev airway: None documented.    Drips: None documented.      Patient Active Problem List   Diagnosis    Mastodynia    Insomnia    Family history of breast cancer in mother    Rh negative status during pregnancy-RHOGAM at 28wks    Indication for care or intervention in labor or delivery    Screening for colorectal cancer    Ectopic pregnancy    'light-for-dates' infant with signs of fetal malnutrition    History of  section complicating pregnancy    39 weeks gestation of pregnancy       Review of patient's allergies indicates:   Allergen Reactions    No known drug allergies        Current Inpatient Medications:   oxytocin in lactated ringers  334 trinidad-units/min Intravenous Once    oxytocin in lactated ringers  95 trinidad-units/min Intravenous Once       No current facility-administered medications on file prior to encounter.      Current Outpatient Medications on File Prior to Encounter   Medication Sig Dispense Refill    prenatal vit,calc76/iron/folic (PNV 29-1 ORAL) Take by mouth.      butalbital-acetaminophen-caffeine -40 mg (FIORICET, ESGIC) -40 mg per tablet TK 1 T PO Q 4 H PRF PAIN  1       Past Surgical History:   Procedure Laterality Date     SECTION      COLONOSCOPY N/A 2016    Procedure: COLONOSCOPY - Miralax split prep;  Surgeon: Jose Antonio Jr., MD;  Location: [facility];  Service: Endoscopy;  Laterality: N/A;    LIPOMA RESECTION       right buttocks    TENDON REPAIR      bilateral feet    TONSILLECTOMY  2005       Social History     Socioeconomic History    Marital status:      Spouse name: Not on file    Number of children: Not on file    Years of education: Not on file    Highest education level: Not on file   Occupational History    Occupation:      Employer: [employer]   Social Needs    Financial resource strain: Not on file    Food insecurity:     Worry: Not on file     Inability: Not on file    Transportation needs:     Medical: Not on file     Non-medical: Not on file   Tobacco Use    Smoking status: Never Smoker    Smokeless tobacco: Never Used   Substance and Sexual Activity    Alcohol use: No     Comment: occasional    Drug use: No    Sexual activity: Yes     Partners: Male     Birth control/protection: None   Lifestyle    Physical activity:     Days per week: Not on file     Minutes per session: Not on file    Stress: Not on file   Relationships    Social connections:     Talks on phone: Not on file     Gets together: Not on file     Attends Confucianist service: Not on file     Active member of club or organization: Not on file     Attends meetings of clubs or organizations: Not on file     Relationship status: Not on file   Other Topics Concern    Are you pregnant or think you may be? No    Breast-feeding No   Social History Narrative    Not on file       OBJECTIVE:     Vital Signs Range (Last 24H):         CBC:   Recent Labs     12/04/19  1050   WBC 13.66*   RBC 4.40   HGB 13.4   HCT 40.6      MCV 92   MCH 30.5   MCHC 33.0       CMP: No results for input(s): NA, K, CL, CO2, BUN, CREATININE, GLU, MG, PHOS, CALCIUM, ALBUMIN, PROT, ALKPHOS, ALT, AST, BILITOT in the last 72 hours.    INR:  No results for input(s): PT, INR, PROTIME, APTT in the last 72 hours.    Diagnostic Studies: No relevant studies.    EKG: No recent studies available.    2D ECHO:  No results found for this or any previous  visit.      ASSESSMENT/PLAN:         Anesthesia Evaluation    I have reviewed the Patient Summary Reports.    I have reviewed the Nursing Notes.   I have reviewed the Medications.     Review of Systems  Anesthesia Hx:  Denies Hx of Anesthetic complications  History of prior surgery of interest to airway management or planning: Previous anesthesia: Epidural, General Denies Family Hx of Anesthesia complications.   Denies Personal Hx of Anesthesia complications.   Social:  Non-Smoker    Hematology/Oncology:  Hematology Normal   Oncology Normal     EENT/Dental:EENT/Dental Normal   Cardiovascular:  Cardiovascular Normal     Pulmonary:  Pulmonary Normal    Hepatic/GI:  Hepatic/GI Normal    Neurological:  Neurology Normal    Endocrine:  Endocrine Normal    Psych:  Psychiatric Normal           Physical Exam  General:  Well nourished, Obesity    Airway/Jaw/Neck:  Airway Findings: Mouth Opening: Normal Tongue: Normal  General Airway Assessment: Adult  Mallampati: II      Dental:  DENTAL FINDINGS: Normal   Chest/Lungs:  Chest/Lungs Clear    Heart/Vascular:  Heart Findings: Normal       Mental Status:  Mental Status Findings:  Cooperative, Alert and Oriented         Anesthesia Plan  Type of Anesthesia, risks & benefits discussed:  Anesthesia Type:  CSE, epidural, general, MAC, spinal  Patient's Preference:   Intra-op Monitoring Plan: standard ASA monitors  Intra-op Monitoring Plan Comments:   Post Op Pain Control Plan: multimodal analgesia  Post Op Pain Control Plan Comments:   Induction:   IV  Beta Blocker:  Patient is not currently on a Beta-Blocker (No further documentation required).       Informed Consent: Patient understands risks and agrees with Anesthesia plan.  Questions answered. Anesthesia consent signed with patient.  ASA Score: 3     Day of Surgery Review of History & Physical:    H&P update referred to the surgeon.         Ready For Surgery From Anesthesia Perspective.

## 2019-12-05 NOTE — ANESTHESIA PROCEDURE NOTES
Spinal    Diagnosis: iup  Patient location during procedure: OR    Staffing  Authorizing Provider: Tara Lam MD  Performing Provider: Tara Lam MD    Preanesthetic Checklist  Completed: patient identified, site marked, surgical consent, pre-op evaluation, timeout performed, IV checked, risks and benefits discussed and monitors and equipment checked  Spinal Block  Patient position: sitting  Prep: ChloraPrep  Patient monitoring: continuous pulse ox and frequent blood pressure checks  Approach: midline  Location: L3-4  Injection technique: single shot  CSF Fluid: clear free-flowing CSF  Needle  Needle type: pencil-tip   Needle gauge: 25 G  Needle length: 3.5 in  Additional Documentation: incremental injection, negative aspiration for heme and no paresthesia on injection  Needle localization: anatomical landmarks  Assessment  Sensory level: T5  Ease of block: easy  Patient's tolerance of the procedure: comfortable throughout block and no complaints

## 2019-12-05 NOTE — DISCHARGE INSTRUCTIONS
Breastfeeding discharge instructions given with First Alert form and reviewed.  Also discussed:   AAP recommendation of exclusive breastfeeding for the first 6 months of life and continued breastfeeding with the introduction of supplemental foods beyond the first year of life.  Instructed on the recommendation to delay all bottle and pacifier use until after 4 weeks of age and breastfeeding is well established.  Discussed the benefits of exclusive breastfeeding for both mother and baby.  Discussed the risks of supplementation/pacifier use on the exclusivity of breastfeeding in the first 6 months. Feed the baby at the earliest sign of hunger or comfort  o Hands to mouth, sucking motions  o Rooting or searching for something to suck on  o Dont wait for crying - it is a not a late sign of hunger; it is a sign of distress     The feedings may be 8-12 times per 24hrs and will not follow a schedule   Alternate the breast you start the feeding with, or start with the breast that feels the fullest   Switch breasts when the baby takes himself off the breast or falls asleep   Keep offering breasts until the baby looks full, no longer gives hunger signs, and stays asleep when placed on his back in the crib   If the baby is sleepy and wont wake for a feeding, put the baby skin-to-skin dressed in a diaper against the mothers bare chest   Sleep near your baby   The baby should be positioned and latched on to the breast correctly  o Chest-to-chest, chin in the breast  o Babys lips are flipped outward  o Babys mouth is stretched open wide like a shout  o Babys sucking should feel like tugging to the mother  - The baby should be drinking at the breast:  o You should hear swallowing or gulping throughout the feeding  o You should see milk on the babys lips when he comes off the breast  o Your breasts should be softer when the baby is finished feeding  o The baby should look relaxed at the end of feedings  o After  the 4th day and your milk is in:  o The babys poop should turn bright yellow and be loose, watery, and seedy  o The baby should have at least 3-4 poops the size of the palm of your hand per day  o The baby should have at least 6-8 wet diapers per day  o The urine should be light yellow in color  You should drink when you are thirsty and eat a healthy diet when you are    hungry.     Take naps to get the rest you need.   Take medications and/or drink alcohol only with permission of your obstetrician    or the babys pediatrician.  You can also call the [facility],   ([phone]), Monday-Friday, 8am-5pm Central time, to get the most   up-to-date evidence-based information on the use of medications during   pregnancy and breastfeeding.      The baby should be examined by a pediatrician at 3-5 days of age; unless ordered sooner by the pediatrician.  Once your milk comes in, the baby should be back to birth weight no later than 10-14 days of age.

## 2019-12-05 NOTE — H&P
History & Physical                                                                   Obstetrics    Subjective:      HPI:  Charis Wells is a 34 y.o.  at 39w5d who is admitted to Labor & Delivery for repeat LTCS.     This pregnancy is complicated by IVF and maternal Rh negative status.     Currently patient has no complaints. She reports active fetal movement, reports uncomfortable contractions, denies vaginal bleeding, and leakage of fluid from the vagina.     OB History    Para Term  AB Living   3 1 1 0 1 1   SAB TAB Ectopic Multiple Live Births   1 0 0 0 1      # Outcome Date GA Lbr Edwin/2nd Weight Sex Delivery Anes PTL Lv   3 Current            2 Term 16 39w3d  3.26 kg (7 lb 3 oz) F CS-LTranv EPI N JEFFERSON      Complications: Failure to Progress in First Stage      Apgar1: 9  Apgar5: 9   1 SAB 2015 4w0d            Past Medical History:   Diagnosis Date    Allergy     Chronic kidney disease     born with kidney disease been under control since age 12    Keloid cicatrix     Lipoma of unspecified site 2012      Past Surgical History:   Procedure Laterality Date     SECTION      COLONOSCOPY N/A 2016    Procedure: COLONOSCOPY - Miralax split prep;  Surgeon: Jose Antonio Jr., MD;  Location: [facility];  Service: Endoscopy;  Laterality: N/A;    LIPOMA RESECTION      right buttocks    TENDON REPAIR      bilateral feet    TONSILLECTOMY         Allergies:   Review of patient's allergies indicates:   Allergen Reactions    No known drug allergies        Medications Prior to Admission   Medication Sig Dispense Refill Last Dose    prenatal vit,calc76/iron/folic (PNV 29-1 ORAL) Take by mouth.   2019 at Unknown time    butalbital-acetaminophen-caffeine -40 mg (FIORICET, ESGIC) -40 mg per tablet TK 1 T PO Q 4 H PRF PAIN  1 Unknown at Unknown time     Social History     Socioeconomic History    Marital status:       Spouse name: Not on file    Number of children: Not on file    Years of education: Not on file    Highest education level: Not on file   Occupational History    Occupation:      Employer: [employer]   Social Needs    Financial resource strain: Not on file    Food insecurity:     Worry: Not on file     Inability: Not on file    Transportation needs:     Medical: Not on file     Non-medical: Not on file   Tobacco Use    Smoking status: Never Smoker    Smokeless tobacco: Never Used   Substance and Sexual Activity    Alcohol use: No     Comment: occasional    Drug use: No    Sexual activity: Yes     Partners: Male     Birth control/protection: None   Lifestyle    Physical activity:     Days per week: Not on file     Minutes per session: Not on file    Stress: Not on file   Relationships    Social connections:     Talks on phone: Not on file     Gets together: Not on file     Attends Shinto service: Not on file     Active member of club or organization: Not on file     Attends meetings of clubs or organizations: Not on file     Relationship status: Not on file   Other Topics Concern    Are you pregnant or think you may be? No    Breast-feeding No   Social History Narrative    Not on file     Family History   Problem Relation Age of Onset    Cancer Mother         breast    Breast cancer Mother 35        , negative genetic testing    Cancer Maternal Grandmother         throat    Diabetes Paternal Grandfather     Melanoma Neg Hx     Colon cancer Neg Hx     Ovarian cancer Neg Hx      Review of Systems   Constitutional: Negative for fever.   Respiratory: Negative for cough and shortness of breath.    Cardiovascular: Negative for leg swelling.   Gastrointestinal: Positive for abdominal pain with contractions. Negative for vomiting.   Genitourinary: Negative for vaginal bleeding. Negative for vaginal discharge.   Neurological: Negative for headaches.   Hematological: Does not bruise/bleed  easily.   All other systems reviewed and are negative.     Objective:      General:   Awake, alert, in no acute distress. Appears stated age.    Lungs:   Quiet, unlabored breathing. No signs of respiratory distress. No wheezing.    Heart:   Regular rate, no peripheral cyanosis, peripheral pulses present and symmetric bilaterally.    Abdomen:  Gravid, size consistent with gestational age. Not distended. Soft, non-tender.    Extremities No swelling in hands or upper extremities. No lower extremity edema.    FHT: Baseline 130 BPM, accelerations present, decelerations absent, moderate BTBV (Category 1)                 TOCO: Every 6 minutes   Presentations: Vertex presentation confirmed with bedside US     Lab Review:  Blood Type: A NEG  GBBS: Neg   Rubella: Immune  RPR: Non-reactive  HIV: Negative  Hepatitis B: Negative     Assessment:     34 y.o.  at 39w5d with pregnancy complicated by IVF and Rh Neg admitted for RLTCS.     Plan:     1. Repeat  delivery  - Consents signed and to chart  - Admit to Labor and Delivery unit  - Epidural per Anesthesia  - Draw CBC, T&S  - Ancef OCTOR  - To OR for C/S. Case Request is in.   - Notify Staff  - Ultrasound performed, infant in vertex position.   - Post-Partum Hemorrhage risk - medium    2. IVF pregnancy    3. Rh Negative  - RhoGAM PP as needed    Robin Guevara M.D. PGY-3  Obstetrics & Gynecology

## 2019-12-05 NOTE — PROGRESS NOTES
2017.561 TXA Consent note:     I met with MsBambi Tosha in recovery room 2 to discuss the TXA study. We discussed the study in detail, including the purpose, numbers/length, procedures, risk/benefit, cost/payment, contacts (investigators/IRB), alternatives/voluntary nature/withdrawal, and confidentiality/HIPPA. Dr. Lopez was available for questions. She verbalized understanding and had no questions. She consented to optional future research. The consent form was signed on 12/05/2019 at 06:56. Patient was given a copy of signed informed consent.     Time of cord clamp: 07:46. Time study medication started: 07:46. Patient was given the entire dosage of study medication over a ten-minute period.

## 2019-12-05 NOTE — PLAN OF CARE
Mother will breastfeed baby 8 or more times in 24 hours on baby's cue until content. Ensure a deep latch at each feeding that feels like a pull and tug. Latch should not be painful but may be tender. Observe for signs of milk transfer such as audible swallows and chin pauses during feeding. Mother should keep baby active at the breast by using compression of breast and stimulating baby throughout feeding.  Mother should use her feeding log to keep track of baby's intake and output. Mother should avoid bottles and pacifiers. Call for asst as needed.

## 2019-12-05 NOTE — LACTATION NOTE
12/05/19 1515   Maternal Assessment   Breast Shape round   Breast Density soft   Areola elastic   Nipples everted;graspable   Maternal Infant Feeding   Maternal Emotional State assist needed;relaxed   Infant Positioning cross-cradle   Latch Assistance yes   Baby is at the breast with a good latch. Mother states it feels like a pull and tug. LC left phone number on mother's white board for mother to call for asst as needed.Told mother what time LC leaves the floor. Mother also told that LC can see when she calls [employer] phone and if LC does not answer, she is busy but will come as soon as possible.

## 2019-12-06 ENCOUNTER — RESEARCH ENCOUNTER (OUTPATIENT)
Dept: RESEARCH | Facility: HOSPITAL | Age: 34
End: 2019-12-06

## 2019-12-06 LAB
ABO + RH BLD: NORMAL
BLD GP AB SCN CELLS X3 SERPL QL: NORMAL
FETAL CELL SCN BLD QL ROSETTE: NORMAL
INJECT RH IG VOL PATIENT: NORMAL ML

## 2019-12-06 PROCEDURE — 25000003 PHARM REV CODE 250: Performed by: STUDENT IN AN ORGANIZED HEALTH CARE EDUCATION/TRAINING PROGRAM

## 2019-12-06 PROCEDURE — 63600519 RHOGAM PHARM REV CODE 636 ALT 250 W HCPCS: Performed by: OBSTETRICS & GYNECOLOGY

## 2019-12-06 PROCEDURE — 99024 POSTOP FOLLOW-UP VISIT: CPT | Mod: ,,, | Performed by: OBSTETRICS & GYNECOLOGY

## 2019-12-06 PROCEDURE — 63600175 PHARM REV CODE 636 W HCPCS: Performed by: STUDENT IN AN ORGANIZED HEALTH CARE EDUCATION/TRAINING PROGRAM

## 2019-12-06 PROCEDURE — 11000001 HC ACUTE MED/SURG PRIVATE ROOM

## 2019-12-06 PROCEDURE — 99024 PR POST-OP FOLLOW-UP VISIT: ICD-10-PCS | Mod: ,,, | Performed by: OBSTETRICS & GYNECOLOGY

## 2019-12-06 RX ADMIN — IBUPROFEN 800 MG: 400 TABLET, FILM COATED ORAL at 08:12

## 2019-12-06 RX ADMIN — HUMAN RHO(D) IMMUNE GLOBULIN 300 MCG: 300 INJECTION, SOLUTION INTRAMUSCULAR at 05:12

## 2019-12-06 RX ADMIN — PRENATAL VIT W/ FE FUMARATE-FA TAB 27-0.8 MG 1 TABLET: 27-0.8 TAB at 09:12

## 2019-12-06 RX ADMIN — DOCUSATE SODIUM 200 MG: 100 CAPSULE, LIQUID FILLED ORAL at 09:12

## 2019-12-06 RX ADMIN — ACETAMINOPHEN 650 MG: 325 TABLET, FILM COATED ORAL at 12:12

## 2019-12-06 RX ADMIN — ACETAMINOPHEN 650 MG: 325 TABLET, FILM COATED ORAL at 02:12

## 2019-12-06 RX ADMIN — KETOROLAC TROMETHAMINE 30 MG: 30 INJECTION, SOLUTION INTRAMUSCULAR; INTRAVENOUS at 02:12

## 2019-12-06 RX ADMIN — IBUPROFEN 800 MG: 400 TABLET, FILM COATED ORAL at 04:12

## 2019-12-06 RX ADMIN — ACETAMINOPHEN 650 MG: 325 TABLET, FILM COATED ORAL at 09:12

## 2019-12-06 RX ADMIN — ACETAMINOPHEN 650 MG: 325 TABLET, FILM COATED ORAL at 08:12

## 2019-12-06 NOTE — ANESTHESIA POSTPROCEDURE EVALUATION
Anesthesia Post Evaluation    Patient: Charis Wells    Procedure(s) Performed: Procedure(s) (LRB):   SECTION (N/A)    Final Anesthesia Type: spinal    Patient location during evaluation: floor  Patient participation: Yes- Able to Participate  Level of consciousness: awake and alert, awake and oriented  Post-procedure vital signs: reviewed and stable  Pain management: adequate  Airway patency: patent    PONV status at discharge: No PONV      Cardiovascular status: blood pressure returned to baseline  Respiratory status: unassisted, spontaneous ventilation and room air  Hydration status: euvolemic            Vitals Value Taken Time   /55 2019  7:33 AM   Temp 36.5 °C (97.7 °F) 2019  7:33 AM   Pulse 77 2019  7:33 AM   Resp 18 2019  7:33 AM   SpO2 99 % 2019  7:33 AM         Event Time     Out of Recovery 11:04:33          Pain/Ingrid Score: Pain Rating Prior to Med Admin: 4 (2019  8:42 AM)  Pain Rating Post Med Admin: 0 (2019  9:42 AM)

## 2019-12-06 NOTE — PROGRESS NOTES
Dr. Cuellar notified of pt's BP. No new orders at this time. Will continue to monitor.      12/05/19 2354   Vital Signs   Temp 98.2 °F (36.8 °C)   Temp src Oral   Pulse 67   Resp 16   SpO2 96 %   BP (!) 88/45   MAP (mmHg) 63

## 2019-12-06 NOTE — PROGRESS NOTES
TXA 2017.561 Postpartum follow up note:     Visited Ms. Wells for TXA study follow up in private post partal room. Patient was awake and alert. Patient denies urticaria, nausea and vomiting, diarrhea, dizziness, blurred or other changes in vision. Clincard provided to patient and instructed will follow up again with her in 1-2 weeks, and again at 6 weeks post partal. Patient verbalized understanding.

## 2019-12-06 NOTE — PROGRESS NOTES
POSTPARTUM PROGRESS NOTE     Charis Wells is a 34 y.o. female POD #1 status post Repeat  section at 39w5d in a pregnancy complicated by IVF preg and RH neg. Patient is doing well this morning. She denies nausea, vomiting, fever or chills.  Patient reports mild abdominal pain that is adequately relieved by oral pain medications. Lochia is mild to moderate  and stable. Patient is voiding without difficulty and ambulating with no difficulty. She has passed flatus.  Patient does plan to breast feed. Per primary OB for contraception. Circumcision was declined by peds.     Objective:       Temp:  [96.9 °F (36.1 °C)-98.2 °F (36.8 °C)] 97.5 °F (36.4 °C)  Pulse:  [63-95] 73  Resp:  [16-18] 16  SpO2:  [95 %-100 %] 95 %  BP: ()/(45-68) 93/55    General:   alert, appears stated age and cooperative   Lungs:   Non-labored respirations    Heart:   regular rate and rhythm   Abdomen:  Soft, nondistended    Uterus:  firm located at the umblicus.    Incision: Bandage in place, clean, dry and intact   Extremities: no pedal edema noted     Lab Review  Recent Labs   Lab 19  1050 19  2258   WBC 13.66* 19.42*   HGB 13.4 11.8*   HCT 40.6 34.7*   MCV 92 91    267              I/O    Intake/Output Summary (Last 24 hours) at 2019 0655  Last data filed at 2019  Gross per 24 hour   Intake --   Output 1930 ml   Net -1930 ml        Assessment:     Patient Active Problem List   Diagnosis    Mastodynia    Insomnia    Family history of breast cancer in mother    Rh negative status during pregnancy-RHOGAM at 28wks    Indication for care or intervention in labor or delivery    Screening for colorectal cancer    Ectopic pregnancy    'light-for-dates' infant with signs of fetal malnutrition    History of  section complicating pregnancy    39 weeks gestation of pregnancy        Plan:   1. Postpartum care:  - Patient doing well. Continue routine management and advances.  - Continue  PO pain meds. Pain well controlled.  - Heme: H/h 13.4/40 >>> 12/35  - Encourage ambulation  - Circumcision declined  - Contraception to be discussed at 6 week pp visit  - Lactation consult PRN    2. RH negative  - Assess for rhogam prior to discharge as needed    Dispo: As patient meets milestones, will plan to discharge POD2-4.    Brandie Galvan M.D.   OB/GYN  PGY-1

## 2019-12-07 PROCEDURE — 11000001 HC ACUTE MED/SURG PRIVATE ROOM

## 2019-12-07 PROCEDURE — 25000003 PHARM REV CODE 250: Performed by: STUDENT IN AN ORGANIZED HEALTH CARE EDUCATION/TRAINING PROGRAM

## 2019-12-07 RX ADMIN — IBUPROFEN 800 MG: 400 TABLET, FILM COATED ORAL at 09:12

## 2019-12-07 RX ADMIN — PRENATAL VIT W/ FE FUMARATE-FA TAB 27-0.8 MG 1 TABLET: 27-0.8 TAB at 09:12

## 2019-12-07 RX ADMIN — DOCUSATE SODIUM 200 MG: 100 CAPSULE, LIQUID FILLED ORAL at 09:12

## 2019-12-07 RX ADMIN — ACETAMINOPHEN 650 MG: 325 TABLET, FILM COATED ORAL at 03:12

## 2019-12-07 RX ADMIN — ACETAMINOPHEN 650 MG: 325 TABLET, FILM COATED ORAL at 10:12

## 2019-12-07 RX ADMIN — DOCUSATE SODIUM 200 MG: 100 CAPSULE, LIQUID FILLED ORAL at 10:12

## 2019-12-07 RX ADMIN — IBUPROFEN 800 MG: 400 TABLET, FILM COATED ORAL at 12:12

## 2019-12-07 RX ADMIN — ACETAMINOPHEN 650 MG: 325 TABLET, FILM COATED ORAL at 09:12

## 2019-12-07 RX ADMIN — ACETAMINOPHEN 650 MG: 325 TABLET, FILM COATED ORAL at 04:12

## 2019-12-07 RX ADMIN — IBUPROFEN 800 MG: 400 TABLET, FILM COATED ORAL at 05:12

## 2019-12-07 NOTE — LACTATION NOTE
"LC rounds, pt reports infant feeding well, noticed blister on left nipple and "maybe last night he wasn't latched as deep as he could be." LC offered assistance with feeding, encouraged waiting for wide gape, deep latch achieved, frequent audible swallows. Discussed infant weight loss, feeding evaluation is positive, pt feels breasts are heavier today and feel less full after feedings, encouraged pt to feed frequently and on demand, to do frequent skin to skin throughout the day. Pt verbalizes understanding and questions answered. LC number left on board and encouraged pt to call for further questions/concerns.       12/07/19 0998   Maternal Assessment   Breast Shape round   Breast Density filling   Areola elastic   Nipples everted   Left Nipple Symptoms blisters   Right Nipple Symptoms tender   Maternal Infant Feeding   Maternal Emotional State independent;relaxed   Infant Positioning cradle   Signs of Milk Transfer audible swallow;infant jaw motion present   Pain with Feeding no   Comfort Measures Following Feeding air-drying encouraged   Nipple Shape After Feeding, Right   (continues nursing)   Latch Assistance yes     "

## 2019-12-07 NOTE — PROGRESS NOTES
POSTPARTUM PROGRESS NOTE     Charis Wells is a 34 y.o. female POD #2 status post Repeat  section at 39w5d in a pregnancy complicated by IVF preg and RH neg. Patient is doing well this morning. She denies nausea, vomiting, fever or chills.  Patient reports mild abdominal pain that is adequately relieved by oral pain medications. Lochia is mild to moderate  and stable. Patient is voiding without difficulty and ambulating with no difficulty. She has passed flatus.  Patient does plan to breast feed. Per primary OB for contraception. Circumcision was declined by peds.     Objective:       Temp:  [97.6 °F (36.4 °C)-97.7 °F (36.5 °C)] 97.7 °F (36.5 °C)  Pulse:  [73-81] 73  Resp:  [18] 18  SpO2:  [97 %-99 %] 97 %  BP: ()/(55-62) 111/59    General:   alert, appears stated age and cooperative   Lungs:   Non-labored respirations    Heart:   regular rate and rhythm   Abdomen:  Soft, nondistended    Uterus:  firm located at the umblicus.    Incision: Incision clean, dry and intact   Extremities: no pedal edema noted     Lab Review  Recent Labs   Lab 19  1050 19  2258   WBC 13.66* 19.42*   HGB 13.4 11.8*   HCT 40.6 34.7*   MCV 92 91    267      Assessment:     Patient Active Problem List   Diagnosis    Mastodynia    Insomnia    Family history of breast cancer in mother    Rh negative status during pregnancy-RHOGAM at 28wks    Indication for care or intervention in labor or delivery    Screening for colorectal cancer    Ectopic pregnancy    'light-for-dates' infant with signs of fetal malnutrition    History of  section complicating pregnancy    39 weeks gestation of pregnancy      Plan:   1. Postpartum care:  - Patient doing well. Continue routine management and advances.  - Continue PO pain meds. Pain well controlled.  - Heme: H/h 13.4/40 >   - Encourage ambulation  - Circumcision declined  - Contraception to be discussed at 6 week pp visit  - Lactation consult  PRN    2. RH negative  - Assess for rhogam prior to discharge as needed    Dispo: As patient meets milestones, will plan to discharge POD2-4.    Robin Guevara M.D.  Obstetrics & Gynecology  PGY-3

## 2019-12-08 VITALS
OXYGEN SATURATION: 98 % | BODY MASS INDEX: 36.68 KG/M2 | WEIGHT: 207 LBS | HEART RATE: 79 BPM | RESPIRATION RATE: 18 BRPM | DIASTOLIC BLOOD PRESSURE: 68 MMHG | SYSTOLIC BLOOD PRESSURE: 108 MMHG | TEMPERATURE: 98 F | HEIGHT: 63 IN

## 2019-12-08 PROBLEM — Z3A.39 39 WEEKS GESTATION OF PREGNANCY: Status: ACTIVE | Noted: 2019-12-08

## 2019-12-08 PROBLEM — Z3A.39 39 WEEKS GESTATION OF PREGNANCY: Status: RESOLVED | Noted: 2019-12-05 | Resolved: 2019-12-08

## 2019-12-08 PROCEDURE — 25000003 PHARM REV CODE 250: Performed by: STUDENT IN AN ORGANIZED HEALTH CARE EDUCATION/TRAINING PROGRAM

## 2019-12-08 RX ORDER — OXYCODONE HYDROCHLORIDE 5 MG/1
5 TABLET ORAL EVERY 4 HOURS PRN
Qty: 21 TABLET | Refills: 0 | Status: SHIPPED | OUTPATIENT
Start: 2019-12-08 | End: 2021-04-08

## 2019-12-08 RX ORDER — IBUPROFEN 800 MG/1
800 TABLET ORAL EVERY 6 HOURS PRN
Qty: 40 TABLET | Refills: 0 | Status: SHIPPED | OUTPATIENT
Start: 2019-12-08 | End: 2021-04-08

## 2019-12-08 RX ADMIN — PRENATAL VIT W/ FE FUMARATE-FA TAB 27-0.8 MG 1 TABLET: 27-0.8 TAB at 09:12

## 2019-12-08 RX ADMIN — IBUPROFEN 800 MG: 400 TABLET, FILM COATED ORAL at 01:12

## 2019-12-08 RX ADMIN — IBUPROFEN 800 MG: 400 TABLET, FILM COATED ORAL at 09:12

## 2019-12-08 RX ADMIN — ACETAMINOPHEN 650 MG: 325 TABLET, FILM COATED ORAL at 03:12

## 2019-12-08 RX ADMIN — DOCUSATE SODIUM 200 MG: 100 CAPSULE, LIQUID FILLED ORAL at 09:12

## 2019-12-08 RX ADMIN — ACETAMINOPHEN 650 MG: 325 TABLET, FILM COATED ORAL at 09:12

## 2019-12-08 NOTE — PROGRESS NOTES
POSTPARTUM PROGRESS NOTE     Charis Wells is a 34 y.o. female POD #3 status post Repeat  section at 39w5d in a pregnancy complicated by IVF preg and RH neg. Patient is doing well this morning. She denies nausea, vomiting, fever or chills.  Patient reports mild abdominal pain that is adequately relieved by oral pain medications. Lochia is mild to moderate  and stable. Patient is voiding without difficulty and ambulating with no difficulty. She has passed flatus.  Patient does plan to breast feed. Per primary OB for contraception. Circumcision was declined by peds.     Objective:       Temp:  [97.5 °F (36.4 °C)-97.6 °F (36.4 °C)] 97.5 °F (36.4 °C)  Pulse:  [69-75] 75  Resp:  [18] 18  SpO2:  [97 %-99 %] 99 %  BP: (104-132)/(63-70) 132/70    General:   alert, appears stated age and cooperative   Lungs:   Non-labored respirations    Heart:   regular rate and rhythm   Abdomen:  Soft, nondistended    Uterus:  firm located at the umblicus.    Incision: Incision clean, dry and intact   Extremities: no pedal edema noted     Lab Review  Recent Labs   Lab 19  1050 19  2258   WBC 13.66* 19.42*   HGB 13.4 11.8*   HCT 40.6 34.7*   MCV 92 91    267      Assessment:     Patient Active Problem List   Diagnosis    Mastodynia    Insomnia    Family history of breast cancer in mother    Rh negative status during pregnancy-RHOGAM at 28wks    Indication for care or intervention in labor or delivery    Screening for colorectal cancer    Ectopic pregnancy    'light-for-dates' infant with signs of fetal malnutrition    History of  section complicating pregnancy      Plan:   1. Postpartum care:  - Patient doing well. Continue routine management and advances.  - Continue PO pain meds. Pain well controlled.  - Heme: H/h 13.4/40 >   - Encourage ambulation  - Circumcision declined  - Contraception to be discussed at 6 week pp visit  - Lactation consult PRN    2. RH negative  - Assess for  rhogam prior to discharge as needed    Dispo: As patient meets milestones, will plan to discharge POD2-4.    Nicholas Quintana MD  OBGYN PGY-1

## 2019-12-08 NOTE — LACTATION NOTE
LC visit to room. LC did lactation discharge instructions yesterday. Allowed patient to ask questions and all questions answered. Mother has her Mother Baby Breastfeeding guide and is aware of the community resources in the back of the book that has [facility] lactation phone number. Mother will call LC's as needed.

## 2019-12-08 NOTE — DISCHARGE SUMMARY
Delivery Discharge Summary  Obstetrics      Primary OB Clinician: Lizzie Mackenzie MD      Admission date: 2019  Discharge date: 2019    Disposition: To home, self care    Discharge Diagnosis List:      Patient Active Problem List   Diagnosis    Mastodynia    Insomnia    Family history of breast cancer in mother    Rh negative status during pregnancy-RHOGAM at 28wks    Indication for care or intervention in labor or delivery    Screening for colorectal cancer    Ectopic pregnancy    'light-for-dates' infant with signs of fetal malnutrition    History of  section complicating pregnancy    39 weeks gestation of pregnancy       Procedure: , due to repeat    Hospital Course:  Charis Wells is a 34 y.o. now , POD #3 who was admitted on 2019 at 39w5d for scheduled CS in a pregnancy complicated by IVF and Rh neg. Patient was subsequently admitted to labor and delivery unit with signed consents.     Please see delivery note for further details. Her postpartum course was uncomplicated. On discharge day, patient's pain is controlled with oral pain medications. Pt is tolerating ambulation without SOB or CP, and regular diet without N/V. Reports lochia is mild. Denies any HA, vision changes, F/C, LE swelling. Denies any breast pain/soreness.    Pt in stable condition and ready for discharge. She has been instructed to start and/or continue medications and follow up with her obstetrics provider as listed below.    Pertinent studies:  CBC  Recent Labs   Lab 19  1050 19  2258   WBC 13.66* 19.42*   HGB 13.4 11.8*   HCT 40.6 34.7*   MCV 92 91    267          Immunization History   Administered Date(s) Administered    HPV Quadrivalent 2007, 2007, 2008    Hepatitis B, Adolescent/High Risk Infant 1997    Influenza - Quadrivalent - PF (6 months and older) 10/27/2015, 10/25/2018, 10/10/2019    Meningococcal Conjugate (MCV4P)  2009    Rho (D) Immune Globulin 2016, 2017, 10/10/2019    Rho (D) Immune Globulin - IM 2016, 2019    Td (ADULT) 1998, 2009    Tdap 2016, 10/10/2019        Delivery:    Episiotomy:     Lacerations:     Repair suture:     Repair # of packets:     Blood loss (ml):       Birth information:  YOB: 2019   Time of birth: 7:45 AM   Sex: male   Delivery type: , Low Transverse   Gestational Age: 39w5d    Delivery Clinician:      Other providers:       Additional  information:  Forceps:    Vacuum:    Breech:    Observed anomalies      Living?:           APGARS  One minute Five minutes Ten minutes   Skin color:         Heart rate:         Grimace:         Muscle tone:         Breathing:         Totals: 9  9        Placenta: Delivered:       appearance      Patient Instructions:   Current Discharge Medication List      START taking these medications    Details   ibuprofen (ADVIL,MOTRIN) 800 MG tablet Take 1 tablet (800 mg total) by mouth every 6 (six) hours as needed for Other.  Qty: 40 tablet, Refills: 0      oxyCODONE (ROXICODONE) 5 MG immediate release tablet Take 1 tablet (5 mg total) by mouth every 4 (four) hours as needed.  Qty: 21 tablet, Refills: 0    Comments: Quantity prescribed more than 7 day supply? No         CONTINUE these medications which have NOT CHANGED    Details   prenatal vit,calc76/iron/folic (PNV 29-1 ORAL) Take by mouth.      butalbital-acetaminophen-caffeine -40 mg (FIORICET, ESGIC) -40 mg per tablet TK 1 T PO Q 4 H PRF PAIN  Refills: 1             Discharge Procedure Orders   Pelvic Rest     Notify your health care provider if you experience any of the following:  temperature >100.4     Notify your health care provider if you experience any of the following:  persistent nausea and vomiting or diarrhea     Notify your health care provider if you experience any of the following:  severe uncontrolled pain     Notify  your health care provider if you experience any of the following:  redness, tenderness, or signs of infection (pain, swelling, redness, odor or green/yellow discharge around incision site)     Notify your health care provider if you experience any of the following:  difficulty breathing or increased cough     Notify your health care provider if you experience any of the following:  severe persistent headache     Notify your health care provider if you experience any of the following:  persistent dizziness, light-headedness, or visual disturbances     Notify your health care provider if you experience any of the following:  increased confusion or weakness     Notify your health care provider if you experience any of the following:     Activity as tolerated       Follow-up Information     Lizzie Mackenzie MD. Schedule an appointment as soon as possible for a visit in 6 weeks.    Specialties:  Obstetrics, Obstetrics and Gynecology  Why:  Postpartum Visit  Contact information:  [address] [place] [id number]  [phone]                  Maida Quarles MD  OBGYN, PGY-2

## 2019-12-12 NOTE — ANESTHESIA POSTPROCEDURE EVALUATION
Anesthesia Post Evaluation    Patient: Charis Wells    Procedure(s) Performed: Procedure(s) (LRB):   SECTION (N/A)    Final Anesthesia Type: general    Patient location during evaluation: PACU  Patient participation: Yes- Able to Participate  Level of consciousness: awake and alert  Post-procedure vital signs: reviewed and stable  Pain management: adequate  Airway patency: patent    PONV status at discharge: No PONV  Anesthetic complications: no      Cardiovascular status: stable  Respiratory status: unassisted and spontaneous ventilation  Hydration status: euvolemic  Follow-up not needed.          Event Time     Out of Recovery 11:04:33          Pain/Ingrid Score: No data recorded

## 2019-12-13 ENCOUNTER — RESEARCH ENCOUNTER (OUTPATIENT)
Dept: RESEARCH | Facility: HOSPITAL | Age: 34
End: 2019-12-13

## 2019-12-13 NOTE — PROGRESS NOTES
2017.561 TXA study 1-2 Week Postpartum survey note:     Spoke with Ms. Wells per telephone for Txa study one week follow up.  Denies hospital visits since discharge; or any thromboembolism, stroke, MI, seizure, endometritis, surgical site infections or pelvic abscess. No transfusion or blood products since discharge. No open label TXA or other antifibrinolytics given, no uterotonics given since discharge, and no reoperations since discharge. Instructed pt we'd follow up again at 6 week PP, verbalized understanding.

## 2019-12-23 ENCOUNTER — TELEPHONE (OUTPATIENT)
Dept: OBSTETRICS AND GYNECOLOGY | Facility: OTHER | Age: 34
End: 2019-12-23

## 2019-12-26 ENCOUNTER — PATIENT MESSAGE (OUTPATIENT)
Dept: ADMINISTRATIVE | Facility: OTHER | Age: 34
End: 2019-12-26

## 2019-12-26 ENCOUNTER — PATIENT MESSAGE (OUTPATIENT)
Dept: OBSTETRICS AND GYNECOLOGY | Facility: CLINIC | Age: 34
End: 2019-12-26

## 2020-01-08 ENCOUNTER — PATIENT MESSAGE (OUTPATIENT)
Dept: OBSTETRICS AND GYNECOLOGY | Facility: CLINIC | Age: 35
End: 2020-01-08

## 2020-01-16 ENCOUNTER — TELEPHONE (OUTPATIENT)
Dept: MATERNAL FETAL MEDICINE | Facility: CLINIC | Age: 35
End: 2020-01-16

## 2020-01-16 NOTE — TELEPHONE ENCOUNTER
*TXA study 6-week f/u* 2017.561    Spoke with Ms. Wells regarding the TXA study. She reports no problems or hospitalizations since discharge from her delivery. $20 added to Mauro, pt notified.

## 2020-01-21 ENCOUNTER — POSTPARTUM VISIT (OUTPATIENT)
Dept: OBSTETRICS AND GYNECOLOGY | Facility: CLINIC | Age: 35
End: 2020-01-21
Payer: COMMERCIAL

## 2020-01-21 VITALS
BODY MASS INDEX: 32.89 KG/M2 | SYSTOLIC BLOOD PRESSURE: 106 MMHG | HEIGHT: 63 IN | DIASTOLIC BLOOD PRESSURE: 72 MMHG | WEIGHT: 185.63 LBS

## 2020-01-21 PROCEDURE — 99999 PR PBB SHADOW E&M-EST. PATIENT-LVL III: ICD-10-PCS | Mod: PBBFAC,,, | Performed by: OBSTETRICS & GYNECOLOGY

## 2020-01-21 PROCEDURE — 0503F PR POSTPARTUM CARE VISIT: ICD-10-PCS | Mod: S$GLB,,, | Performed by: OBSTETRICS & GYNECOLOGY

## 2020-01-21 PROCEDURE — 99999 PR PBB SHADOW E&M-EST. PATIENT-LVL III: CPT | Mod: PBBFAC,,, | Performed by: OBSTETRICS & GYNECOLOGY

## 2020-01-21 PROCEDURE — 0503F POSTPARTUM CARE VISIT: CPT | Mod: S$GLB,,, | Performed by: OBSTETRICS & GYNECOLOGY

## 2020-01-21 NOTE — PROGRESS NOTES
"CC: Post-partum follow-up    Charis Wells is a 34 y.o. female  who presents for post-partum visit.  She is S/P a repeat CS on DEC 5,2019.  She and the baby are doing well.  No pain.  No fever.   No bowel / bladder complaints.    Delivery Date: 2019  Delivery MD:RUFINA Mackenzie  Gender: male  Breast Feeding: YES  Depression: NO  Contraception: no method    Pregnancy was complicated by:  Prior CS    /72   Ht 5' 3" (1.6 m)   Wt 84.2 kg (185 lb 10 oz)   LMP 2019   Breastfeeding? Yes   BMI 32.88 kg/m²     ROS:  GENERAL: No fever, chills, fatigability.  VULVAR: No pain, no lesions and no itching.  VAGINAL: No relaxation, no itching, no discharge, no abnormal bleeding and no lesions.  ABDOMEN: No abdominal pain. Denies nausea. Denies vomiting. No diarrhea. No constipation  BREAST: Denies pain. No lumps.  URINARY: No incontinence, no nocturia, no frequency and no dysuria.  CARDIOVASCULAR: No chest pain. No shortness of breath. No leg cramps.  NEUROLOGICAL: No headaches. No vision changes.    PHYSICAL EXAM:  Exam chaperoned by nurse  ABDOMEN:  Soft, non-tender, non-distended  VULVA:  Normal, no lesions  CERVIX:  Without lesions, polyps or tenderness.  UTERUS:  Normal size, shape, consistency, no mass or tenderness.  ADNEXA:  Normal in size without mass or tenderness    IMP:  Doing well S/P CSection  Instructions / precautions reviewed  Contraceptive counseling    Rx NONE    PLAN:  May resume normal activities  Return: PRN          "

## 2020-01-29 ENCOUNTER — PATIENT MESSAGE (OUTPATIENT)
Dept: OBSTETRICS AND GYNECOLOGY | Facility: CLINIC | Age: 35
End: 2020-01-29

## 2020-10-16 ENCOUNTER — OFFICE VISIT (OUTPATIENT)
Dept: DERMATOLOGY | Facility: CLINIC | Age: 35
End: 2020-10-16
Payer: COMMERCIAL

## 2020-10-16 DIAGNOSIS — L91.8 ST (SKIN TAG): ICD-10-CM

## 2020-10-16 DIAGNOSIS — L81.4 LENTIGINES: ICD-10-CM

## 2020-10-16 DIAGNOSIS — D18.01 CHERRY ANGIOMA: ICD-10-CM

## 2020-10-16 DIAGNOSIS — Z12.83 SKIN CANCER SCREENING: ICD-10-CM

## 2020-10-16 DIAGNOSIS — B35.3 TINEA PEDIS OF LEFT FOOT: Primary | ICD-10-CM

## 2020-10-16 DIAGNOSIS — D22.9 MULTIPLE BENIGN NEVI: ICD-10-CM

## 2020-10-16 PROCEDURE — 99999 PR PBB SHADOW E&M-EST. PATIENT-LVL III: CPT | Mod: PBBFAC,,, | Performed by: DERMATOLOGY

## 2020-10-16 PROCEDURE — 87220 PR  TISSUE EXAM BY KOH: ICD-10-PCS | Mod: S$GLB,,, | Performed by: DERMATOLOGY

## 2020-10-16 PROCEDURE — 99202 PR OFFICE/OUTPT VISIT, NEW, LEVL II, 15-29 MIN: ICD-10-PCS | Mod: 25,S$GLB,, | Performed by: DERMATOLOGY

## 2020-10-16 PROCEDURE — 99999 PR PBB SHADOW E&M-EST. PATIENT-LVL III: ICD-10-PCS | Mod: PBBFAC,,, | Performed by: DERMATOLOGY

## 2020-10-16 PROCEDURE — 87220 TISSUE EXAM FOR FUNGI: CPT | Mod: S$GLB,,, | Performed by: DERMATOLOGY

## 2020-10-16 PROCEDURE — 99202 OFFICE O/P NEW SF 15 MIN: CPT | Mod: 25,S$GLB,, | Performed by: DERMATOLOGY

## 2020-10-16 RX ORDER — KETOCONAZOLE 20 MG/G
CREAM TOPICAL
Qty: 60 G | Refills: 3 | Status: SHIPPED | OUTPATIENT
Start: 2020-10-16 | End: 2021-05-07

## 2020-10-16 NOTE — PATIENT INSTRUCTIONS
Summer Sun Protection      The [facility] would like to remind you of the importance of sun protection all year round and particularly during the summer when the suns rays are the strongest. It has been proven that both acute and chronic sun exposure damages our cells and leads to skin cancer. Beyond skin cancer, the sun causes 90% of the symptoms of pre-mature skin aging, including wrinkles, lentigines (brown spots), and thin, easily bruised skin. Proper sun protection can help prevent these unwanted conditions.    Many patients report that the dont go in the sun. It has been shown that the average person receives 18 hours of incidental sun exposure per week during activities such as walking through parking lots, driving, or sitting next to windows. This accumulates to several bad sunburns per year!    In choosing sunscreen, you want one that protects against both UVA and UVB rays. It is recommended that you use one of SPF 30 or higher. It is important to apply the sunscreen about 20 minutes prior to sun exposure. Most sunscreens are chemical sunscreens and a reaction must take place in the skin so that they are effective. If they are applied and then you are immediately exposed to the sun or start sweating, this reaction has not had time to take place and you are therefore unprotected. Sunscreen needs to be reapplied every 2 hours if you are participating in water sports or sweating. We recommend Elta MD or Neutrogena Ultra Sheer Dry Touch SPF 55 for daily use; however there are many options and it is most important for you to find one that you will use on a consistent basis.    If you have sensitive skin, you may do best with a sunscreen that contains only physical blockers such as titanium dioxide or zinc oxide. These are typically thicker and harder to apply, however they afford very good protection. Neutrogena Sensitive Skin, Blue Lizard Sensitive Skin (pink top) or Neutrogena Pure  and Free are popular ones.     Aside from sunscreen, clothes with UV protection, wide brimmed hats, and sunglasses are other means of sun protection that we recommend.                        [facility] [address] [address]  [place] [address]  Dept: [phone]  Dept Fax: [phone]                                                                               CRYOSURGERY      Your doctor has used a method called cryosurgery to treat your skin condition. Cryosurgery refers to the use of very cold substances to treat a variety of skin conditions such as warts, pre-skin cancers, molluscum contagiosum, sun spots, and several benign growths. The substance we use in cryosurgery is liquid nitrogen and is so cold (-195 degrees Celsius) that is burns when administered.     Following treatment in the office, the skin may immediately burn and become red. You may find the area around the lesion is affected as well. It is sometimes necessary to treat not only the lesion, but a small area of the surrounding normal skin to achieve a good response.     A blister, and even a blood filled blister, may form after treatment.   This is a normal response. If the blister is painful, it is acceptable to sterilize a needle and with rubbing alcohol and gently pop the blister. It is important that you gently wash the area with soap and warm water as the blister fluid may contain wart virus if a wart was treated. Do no remove the roof of the blister.     The area treated can take anywhere from 1-3 weeks to heal. Healing time depends on the kind skin lesion treated, the location, and how aggressively the lesion was treated. It is recommended that the areas treated are covered with Vaseline or bacitracin ointment and a band-aid. If a band-aid is not practical, just ointment applied several times per day will do. Keeping these areas moist will speed the healing time.    Treatment with liquid  nitrogen can leave a scar. In dark skin, it may be a light or dark scar, in light skin it may be a white or pink scar. These will generally fade with time, but may never go away completely.     If you have any concerns after your treatment, please feel free to call the office.       [facility] [place] [address]/ [phone] FAX/ [email or web address]

## 2020-10-16 NOTE — PROGRESS NOTES
"  Subjective:       Patient ID:  Charis Wells is a 34 y.o. female who presents for   Chief Complaint   Patient presents with    Skin Check     UBSE    Nail Problem     L foot, dry, peeling, itching, fluid filled bumps, Tx. none     HPI   Patient is here today for a "mole" check.   Pt has a history of  severe sun exposure in the past.   Pt recalls several blistering sunburns in the past- yes  Pt has history of tanning bed use- yes  Pt has  had moles removed in the past- no  Pt has history of melanoma in first degree relatives-  Not that pt is aware    Also c/o itchy, dry rash on L foot x months. Occasionally gets itchy fluid-filled bumps there too and has moist areas between toes. No similar problems on R foot. No current tx.    Pt is breastfeeding.    Review of Systems   Constitutional: Negative for fever, chills, weight loss, weight gain, fatigue, night sweats and malaise.   Respiratory: Negative for cough and shortness of breath.    Skin: Positive for daily sunscreen use, activity-related sunscreen use and wears hat. Negative for recent sunburn.   Hematologic/Lymphatic: Bruises/bleeds easily.        Objective:    Physical Exam   Constitutional: She appears well-developed and well-nourished.   Neurological: She is alert and oriented to person, place, and time.   Psychiatric: She has a normal mood and affect.   Skin:   Areas Examined (abnormalities noted in diagram):   Head / Face Inspection Performed  Neck Inspection Performed  Chest / Axilla Inspection Performed  Abdomen Inspection Performed  Back Inspection Performed  RLE Inspected  LLE Inspection Performed  Nails and Digits Inspection Performed                       Diagram Legend     Erythematous scaling macule/papule c/w actinic keratosis       Vascular papule c/w angioma      Pigmented verrucoid papule/plaque c/w seborrheic keratosis      Yellow umbilicated papule c/w sebaceous hyperplasia      Irregularly shaped tan macule c/w lentigo     1-2 mm " smooth white papules consistent with Milia      Movable subcutaneous cyst with punctum c/w epidermal inclusion cyst      Subcutaneous movable cyst c/w pilar cyst      Firm pink to brown papule c/w dermatofibroma      Pedunculated fleshy papule(s) c/w skin tag(s)      Evenly pigmented macule c/w junctional nevus     Mildly variegated pigmented, slightly irregular-bordered macule c/w mildly atypical nevus      Flesh colored to evenly pigmented papule c/w intradermal nevus       Pink pearly papule/plaque c/w basal cell carcinoma      Erythematous hyperkeratotic cursted plaque c/w SCC      Surgical scar with no sign of skin cancer recurrence      Open and closed comedones      Inflammatory papules and pustules      Verrucoid papule consistent consistent with wart     Erythematous eczematous patches and plaques     Dystrophic onycholytic nail with subungual debris c/w onychomycosis     Umbilicated papule    Erythematous-base heme-crusted tan verrucoid plaque consistent with inflamed seborrheic keratosis     Erythematous Silvery Scaling Plaque c/w Psoriasis     See annotation      Assessment / Plan:        Tinea pedis of left foot  KOH today + for hyphae  -     ketoconazole (NIZORAL) 2 % cream; AAA on foot BID x 4 weeks  Dispense: 60 g; Refill: 3  Rec: vinegar:water soaks x 5 min daily    ST (skin tag)  Reassurance given to patient. No treatment is necessary.   Treatment of benign, asymptomatic lesions may be considered cosmetic.    Cherry angioma  This is a benign vascular lesion. Reassurance given. No treatment required. Treatment of benign, asymptomatic lesions may be considered cosmetic.    Multiple benign nevi  Benign-appearing on exam today. Counseled pt to monitor mole(s) and return to clinic if any changes noted or symptoms (bleeding, itching, pain, etc) noted. Brochure provided.    Lentigines  These are benign sun spots which should be monitored for changes. Patient instructed in importance of daily broad  spectrum sunscreen use with spf at least 30. Sun avoidance and topical protection/protective clothing discussed.    Skin cancer screening  Upper body skin examination performed today including at least 6 points as noted in physical examination. No lesions suspicious for malignancy noted.  Patient instructed in importance of daily broad spectrum sunscreen use with spf at least 30. Sun avoidance and topical protection/protective clothing discussed.    Follow up in about 1 year (around 10/16/2021) for skin check or sooner for any concerns.

## 2020-10-16 NOTE — LETTER
October 16, 2020      Mariajose Ma MD  [address] [place]  [facility] [address]           [facility] - Dermatology [address]  [address]  [facility] [address]  Phone: [phone]  Fax: [phone]          Patient: Charis Wells   MR Number: [id number]   YOB: 1985   Date of Visit: 10/16/2020       Dear Dr. Mariajose Ma:    Thank you for referring Charis Wells to me for evaluation. Attached you will find relevant portions of my assessment and plan of care.    If you have questions, please do not hesitate to call me. I look forward to following Charis Wells along with you.    Sincerely,    Ashanti Rendon MD    Enclosure  CC:  No Recipients    If you would like to receive this communication electronically, please contact [email or web address] or [phone] to request more information on [facility] [employer] [facility] access.    For providers and/or their staff who would like to refer a patient to [facility], please contact us through our one-stop-shop provider referral line, [facility], at [phone].    If you feel you have received this communication in error or would no longer like to receive these types of communications, please e-mail [email or web address]

## 2020-11-16 PROBLEM — Z3A.39 39 WEEKS GESTATION OF PREGNANCY: Status: RESOLVED | Noted: 2019-12-08 | Resolved: 2020-11-16

## 2021-01-01 NOTE — TELEPHONE ENCOUNTER
Patient is requesting a prescription for progesterone. Patient is in early stages of pregnancy and progesterone is dropping. Patient will also be scheduled for repeat labs.    (2) well flexed

## 2021-01-02 ENCOUNTER — CLINICAL SUPPORT (OUTPATIENT)
Dept: URGENT CARE | Facility: CLINIC | Age: 36
End: 2021-01-02
Payer: COMMERCIAL

## 2021-01-02 DIAGNOSIS — Z11.59 ENCOUNTER FOR SCREENING FOR OTHER VIRAL DISEASES: Primary | ICD-10-CM

## 2021-01-02 LAB
CTP QC/QA: YES
SARS-COV-2 RDRP RESP QL NAA+PROBE: NEGATIVE

## 2021-01-02 PROCEDURE — U0002 COVID-19 LAB TEST NON-CDC: HCPCS | Mod: QW,S$GLB,, | Performed by: PHYSICIAN ASSISTANT

## 2021-01-02 PROCEDURE — U0002: ICD-10-PCS | Mod: QW,S$GLB,, | Performed by: PHYSICIAN ASSISTANT

## 2021-04-08 ENCOUNTER — OFFICE VISIT (OUTPATIENT)
Dept: OBSTETRICS AND GYNECOLOGY | Facility: CLINIC | Age: 36
End: 2021-04-08
Payer: COMMERCIAL

## 2021-04-08 ENCOUNTER — TELEPHONE (OUTPATIENT)
Dept: OBSTETRICS AND GYNECOLOGY | Facility: CLINIC | Age: 36
End: 2021-04-08

## 2021-04-08 ENCOUNTER — HOSPITAL ENCOUNTER (OUTPATIENT)
Dept: RADIOLOGY | Facility: OTHER | Age: 36
Discharge: HOME | End: 2021-04-08
Attending: OBSTETRICS & GYNECOLOGY
Payer: COMMERCIAL

## 2021-04-08 ENCOUNTER — PATIENT MESSAGE (OUTPATIENT)
Dept: OBSTETRICS AND GYNECOLOGY | Facility: CLINIC | Age: 36
End: 2021-04-08

## 2021-04-08 VITALS
BODY MASS INDEX: 30.82 KG/M2 | WEIGHT: 173.94 LBS | HEIGHT: 63 IN | SYSTOLIC BLOOD PRESSURE: 118 MMHG | DIASTOLIC BLOOD PRESSURE: 78 MMHG

## 2021-04-08 DIAGNOSIS — N91.2 AMENORRHEA: Primary | ICD-10-CM

## 2021-04-08 DIAGNOSIS — Z87.59 HISTORY OF ECTOPIC PREGNANCY: ICD-10-CM

## 2021-04-08 DIAGNOSIS — O36.80X0 PREGNANCY OF UNKNOWN ANATOMIC LOCATION: Primary | ICD-10-CM

## 2021-04-08 DIAGNOSIS — Z32.01 POSITIVE URINE PREGNANCY TEST: ICD-10-CM

## 2021-04-08 LAB
B-HCG UR QL: POSITIVE
C TRACH DNA SPEC QL NAA+PROBE: NOT DETECTED
CTP QC/QA: YES
N GONORRHOEA DNA SPEC QL NAA+PROBE: NOT DETECTED

## 2021-04-08 PROCEDURE — 99214 PR OFFICE/OUTPT VISIT, EST, LEVL IV, 30-39 MIN: ICD-10-PCS | Mod: 25,S$GLB,, | Performed by: OBSTETRICS & GYNECOLOGY

## 2021-04-08 PROCEDURE — 99999 PR PBB SHADOW E&M-EST. PATIENT-LVL III: ICD-10-PCS | Mod: PBBFAC,,, | Performed by: OBSTETRICS & GYNECOLOGY

## 2021-04-08 PROCEDURE — 81025 POCT URINE PREGNANCY: ICD-10-PCS | Mod: S$GLB,,, | Performed by: OBSTETRICS & GYNECOLOGY

## 2021-04-08 PROCEDURE — 88175 CYTOPATH C/V AUTO FLUID REDO: CPT | Performed by: OBSTETRICS & GYNECOLOGY

## 2021-04-08 PROCEDURE — 76801 US OB <14 WEEKS, TRANSABDOM & TRANSVAG, SINGLE GESTATION (XPD): ICD-10-PCS | Mod: 26,,, | Performed by: SPECIALIST

## 2021-04-08 PROCEDURE — 87624 HPV HI-RISK TYP POOLED RSLT: CPT | Performed by: OBSTETRICS & GYNECOLOGY

## 2021-04-08 PROCEDURE — 87591 N.GONORRHOEAE DNA AMP PROB: CPT | Performed by: OBSTETRICS & GYNECOLOGY

## 2021-04-08 PROCEDURE — 76817 TRANSVAGINAL US OBSTETRIC: CPT | Mod: 26,,, | Performed by: SPECIALIST

## 2021-04-08 PROCEDURE — 87491 CHLMYD TRACH DNA AMP PROBE: CPT | Performed by: OBSTETRICS & GYNECOLOGY

## 2021-04-08 PROCEDURE — 76801 OB US < 14 WKS SINGLE FETUS: CPT | Mod: 26,,, | Performed by: SPECIALIST

## 2021-04-08 PROCEDURE — 99214 OFFICE O/P EST MOD 30 MIN: CPT | Mod: 25,S$GLB,, | Performed by: OBSTETRICS & GYNECOLOGY

## 2021-04-08 PROCEDURE — 76817 TRANSVAGINAL US OBSTETRIC: CPT | Mod: TC

## 2021-04-08 PROCEDURE — 87086 URINE CULTURE/COLONY COUNT: CPT | Performed by: OBSTETRICS & GYNECOLOGY

## 2021-04-08 PROCEDURE — 99999 PR PBB SHADOW E&M-EST. PATIENT-LVL III: CPT | Mod: PBBFAC,,, | Performed by: OBSTETRICS & GYNECOLOGY

## 2021-04-08 PROCEDURE — 81025 URINE PREGNANCY TEST: CPT | Mod: S$GLB,,, | Performed by: OBSTETRICS & GYNECOLOGY

## 2021-04-08 PROCEDURE — 76817 US OB <14 WEEKS, TRANSABDOM & TRANSVAG, SINGLE GESTATION (XPD): ICD-10-PCS | Mod: 26,,, | Performed by: SPECIALIST

## 2021-04-09 LAB — BACTERIA UR CULT: NO GROWTH

## 2021-04-10 ENCOUNTER — LAB VISIT (OUTPATIENT)
Dept: LAB | Facility: HOSPITAL | Age: 36
End: 2021-04-10
Attending: OBSTETRICS & GYNECOLOGY
Payer: COMMERCIAL

## 2021-04-10 ENCOUNTER — PATIENT MESSAGE (OUTPATIENT)
Dept: OBSTETRICS AND GYNECOLOGY | Facility: CLINIC | Age: 36
End: 2021-04-10

## 2021-04-10 DIAGNOSIS — Z87.59 HISTORY OF ECTOPIC PREGNANCY: ICD-10-CM

## 2021-04-10 DIAGNOSIS — Z32.01 POSITIVE URINE PREGNANCY TEST: ICD-10-CM

## 2021-04-10 LAB — HCG INTACT+B SERPL-ACNC: 1713 MIU/ML

## 2021-04-10 PROCEDURE — 84702 CHORIONIC GONADOTROPIN TEST: CPT | Performed by: OBSTETRICS & GYNECOLOGY

## 2021-04-10 PROCEDURE — 36415 COLL VENOUS BLD VENIPUNCTURE: CPT | Performed by: OBSTETRICS & GYNECOLOGY

## 2021-04-14 LAB
CLINICAL INFO: NORMAL
CYTO CVX: NORMAL
CYTOLOGIST CVX/VAG CYTO: NORMAL
CYTOLOGY CMNT CVX/VAG CYTO-IMP: NORMAL
CYTOLOGY PAP THIN PREP EXPLANATION: NORMAL
DATE OF PREVIOUS PAP: NORMAL
DATE PREVIOUS BX: NO
HPV I/H RISK 4 DNA CVX QL NAA+PROBE: NOT DETECTED
LMP START DATE: NORMAL
SPECIMEN SOURCE CVX/VAG CYTO: NORMAL
STAT OF ADQ CVX/VAG CYTO-IMP: NORMAL

## 2021-04-15 ENCOUNTER — LAB VISIT (OUTPATIENT)
Dept: LAB | Facility: HOSPITAL | Age: 36
End: 2021-04-15
Attending: OBSTETRICS & GYNECOLOGY
Payer: COMMERCIAL

## 2021-04-15 DIAGNOSIS — O36.80X0 PREGNANCY OF UNKNOWN ANATOMIC LOCATION: ICD-10-CM

## 2021-04-15 LAB — HCG INTACT+B SERPL-ACNC: NORMAL MIU/ML

## 2021-04-15 PROCEDURE — 36415 COLL VENOUS BLD VENIPUNCTURE: CPT | Performed by: OBSTETRICS & GYNECOLOGY

## 2021-04-15 PROCEDURE — 84702 CHORIONIC GONADOTROPIN TEST: CPT | Performed by: OBSTETRICS & GYNECOLOGY

## 2021-04-21 ENCOUNTER — TELEPHONE (OUTPATIENT)
Dept: OBSTETRICS AND GYNECOLOGY | Facility: CLINIC | Age: 36
End: 2021-04-21

## 2021-04-21 ENCOUNTER — PROCEDURE VISIT (OUTPATIENT)
Dept: OBSTETRICS AND GYNECOLOGY | Facility: CLINIC | Age: 36
End: 2021-04-21
Payer: COMMERCIAL

## 2021-04-21 DIAGNOSIS — Z34.90 PREGNANCY: ICD-10-CM

## 2021-04-21 DIAGNOSIS — Z34.90 PREGNANCY: Primary | ICD-10-CM

## 2021-04-21 DIAGNOSIS — Z36.89 ESTABLISH GESTATIONAL AGE, ULTRASOUND: ICD-10-CM

## 2021-04-21 PROCEDURE — 76801 OB US < 14 WKS SINGLE FETUS: CPT | Mod: S$GLB,,, | Performed by: OBSTETRICS & GYNECOLOGY

## 2021-04-21 PROCEDURE — 76801 PR US, OB <14WKS, TRANSABD, SINGLE GESTATION: ICD-10-PCS | Mod: S$GLB,,, | Performed by: OBSTETRICS & GYNECOLOGY

## 2021-04-24 ENCOUNTER — PATIENT MESSAGE (OUTPATIENT)
Dept: OBSTETRICS AND GYNECOLOGY | Facility: CLINIC | Age: 36
End: 2021-04-24

## 2021-05-07 ENCOUNTER — ROUTINE PRENATAL (OUTPATIENT)
Dept: OBSTETRICS AND GYNECOLOGY | Facility: CLINIC | Age: 36
End: 2021-05-07
Payer: COMMERCIAL

## 2021-05-07 VITALS — SYSTOLIC BLOOD PRESSURE: 120 MMHG | DIASTOLIC BLOOD PRESSURE: 80 MMHG | WEIGHT: 176.13 LBS | BODY MASS INDEX: 31.2 KG/M2

## 2021-05-07 DIAGNOSIS — O21.9 NAUSEA/VOMITING IN PREGNANCY: Primary | ICD-10-CM

## 2021-05-07 DIAGNOSIS — O09.521 ADVANCED MATERNAL AGE IN MULTIGRAVIDA, FIRST TRIMESTER: ICD-10-CM

## 2021-05-07 PROCEDURE — 99999 PR PBB SHADOW E&M-EST. PATIENT-LVL II: ICD-10-PCS | Mod: PBBFAC,,, | Performed by: OBSTETRICS & GYNECOLOGY

## 2021-05-07 PROCEDURE — 0500F PR INITIAL PRENATAL CARE VISIT: ICD-10-PCS | Mod: S$GLB,,, | Performed by: OBSTETRICS & GYNECOLOGY

## 2021-05-07 PROCEDURE — 99999 PR PBB SHADOW E&M-EST. PATIENT-LVL II: CPT | Mod: PBBFAC,,, | Performed by: OBSTETRICS & GYNECOLOGY

## 2021-05-07 PROCEDURE — 0500F INITIAL PRENATAL CARE VISIT: CPT | Mod: S$GLB,,, | Performed by: OBSTETRICS & GYNECOLOGY

## 2021-05-07 RX ORDER — PYRIDOXINE HCL (VITAMIN B6) 25 MG
TABLET ORAL
Qty: 40 TABLET | Refills: 3 | Status: SHIPPED | OUTPATIENT
Start: 2021-05-07 | End: 2021-09-15

## 2021-05-08 ENCOUNTER — PATIENT MESSAGE (OUTPATIENT)
Dept: OBSTETRICS AND GYNECOLOGY | Facility: CLINIC | Age: 36
End: 2021-05-08

## 2021-06-01 ENCOUNTER — PATIENT MESSAGE (OUTPATIENT)
Dept: ADMINISTRATIVE | Facility: OTHER | Age: 36
End: 2021-06-01

## 2021-06-03 ENCOUNTER — PATIENT MESSAGE (OUTPATIENT)
Dept: OBSTETRICS AND GYNECOLOGY | Facility: CLINIC | Age: 36
End: 2021-06-03

## 2021-06-04 ENCOUNTER — ROUTINE PRENATAL (OUTPATIENT)
Dept: OBSTETRICS AND GYNECOLOGY | Facility: CLINIC | Age: 36
End: 2021-06-04
Payer: COMMERCIAL

## 2021-06-04 ENCOUNTER — PATIENT MESSAGE (OUTPATIENT)
Dept: ADMINISTRATIVE | Facility: OTHER | Age: 36
End: 2021-06-04

## 2021-06-04 VITALS
BODY MASS INDEX: 31.91 KG/M2 | WEIGHT: 180.13 LBS | DIASTOLIC BLOOD PRESSURE: 68 MMHG | SYSTOLIC BLOOD PRESSURE: 118 MMHG

## 2021-06-04 DIAGNOSIS — Z67.91 RH NEGATIVE STATUS DURING PREGNANCY IN SECOND TRIMESTER: ICD-10-CM

## 2021-06-04 DIAGNOSIS — O09.521 ADVANCED MATERNAL AGE IN MULTIGRAVIDA, FIRST TRIMESTER: Primary | ICD-10-CM

## 2021-06-04 DIAGNOSIS — O26.892 RH NEGATIVE STATUS DURING PREGNANCY IN SECOND TRIMESTER: ICD-10-CM

## 2021-06-04 PROCEDURE — 0502F SUBSEQUENT PRENATAL CARE: CPT | Mod: S$GLB,,, | Performed by: OBSTETRICS & GYNECOLOGY

## 2021-06-04 PROCEDURE — 0502F PR SUBSEQUENT PRENATAL CARE: ICD-10-PCS | Mod: S$GLB,,, | Performed by: OBSTETRICS & GYNECOLOGY

## 2021-06-04 PROCEDURE — 99999 PR PBB SHADOW E&M-EST. PATIENT-LVL III: CPT | Mod: PBBFAC,,, | Performed by: OBSTETRICS & GYNECOLOGY

## 2021-06-04 PROCEDURE — 99999 PR PBB SHADOW E&M-EST. PATIENT-LVL III: ICD-10-PCS | Mod: PBBFAC,,, | Performed by: OBSTETRICS & GYNECOLOGY

## 2021-06-08 ENCOUNTER — PATIENT MESSAGE (OUTPATIENT)
Dept: ADMINISTRATIVE | Facility: OTHER | Age: 36
End: 2021-06-08

## 2021-07-08 ENCOUNTER — LAB VISIT (OUTPATIENT)
Dept: LAB | Facility: HOSPITAL | Age: 36
End: 2021-07-08
Attending: OBSTETRICS & GYNECOLOGY
Payer: COMMERCIAL

## 2021-07-08 ENCOUNTER — ROUTINE PRENATAL (OUTPATIENT)
Dept: OBSTETRICS AND GYNECOLOGY | Facility: CLINIC | Age: 36
End: 2021-07-08
Payer: COMMERCIAL

## 2021-07-08 VITALS
WEIGHT: 189.38 LBS | BODY MASS INDEX: 33.55 KG/M2 | DIASTOLIC BLOOD PRESSURE: 68 MMHG | SYSTOLIC BLOOD PRESSURE: 108 MMHG

## 2021-07-08 DIAGNOSIS — O09.522 ADVANCED MATERNAL AGE IN MULTIGRAVIDA, SECOND TRIMESTER: ICD-10-CM

## 2021-07-08 DIAGNOSIS — Z34.82 ENCOUNTER FOR SUPERVISION OF OTHER NORMAL PREGNANCY IN SECOND TRIMESTER: Primary | ICD-10-CM

## 2021-07-08 DIAGNOSIS — Z34.82 ENCOUNTER FOR SUPERVISION OF OTHER NORMAL PREGNANCY IN SECOND TRIMESTER: ICD-10-CM

## 2021-07-08 DIAGNOSIS — Z98.891 HISTORY OF CESAREAN DELIVERY: ICD-10-CM

## 2021-07-08 DIAGNOSIS — O26.892 RH NEGATIVE STATUS DURING PREGNANCY IN SECOND TRIMESTER: ICD-10-CM

## 2021-07-08 DIAGNOSIS — Z67.91 RH NEGATIVE STATUS DURING PREGNANCY IN SECOND TRIMESTER: ICD-10-CM

## 2021-07-08 PROCEDURE — 82105 ALPHA-FETOPROTEIN SERUM: CPT | Performed by: OBSTETRICS & GYNECOLOGY

## 2021-07-08 PROCEDURE — 36415 COLL VENOUS BLD VENIPUNCTURE: CPT | Performed by: OBSTETRICS & GYNECOLOGY

## 2021-07-08 PROCEDURE — 99999 PR PBB SHADOW E&M-EST. PATIENT-LVL II: ICD-10-PCS | Mod: PBBFAC,,, | Performed by: OBSTETRICS & GYNECOLOGY

## 2021-07-08 PROCEDURE — 0502F PR SUBSEQUENT PRENATAL CARE: ICD-10-PCS | Mod: S$GLB,,, | Performed by: OBSTETRICS & GYNECOLOGY

## 2021-07-08 PROCEDURE — 0502F SUBSEQUENT PRENATAL CARE: CPT | Mod: S$GLB,,, | Performed by: OBSTETRICS & GYNECOLOGY

## 2021-07-08 PROCEDURE — 99999 PR PBB SHADOW E&M-EST. PATIENT-LVL II: CPT | Mod: PBBFAC,,, | Performed by: OBSTETRICS & GYNECOLOGY

## 2021-07-12 LAB
# FETUSES US: NORMAL
AFP INTERPRETATION: NORMAL
AFP MOM SERPL: 0.77
AFP SERPL-MCNC: 27.3 NG/ML
AFP SERPL-MCNC: NEGATIVE NG/ML
AGE AT DELIVERY: 36
GA (DAYS): 2 D
GA (WEEKS): 17 WK
GESTATIONAL AGE METHOD: NORMAL
IDDM PATIENT QL: NORMAL
SMOKING STATUS FTND: NO

## 2021-07-21 ENCOUNTER — PROCEDURE VISIT (OUTPATIENT)
Dept: MATERNAL FETAL MEDICINE | Facility: CLINIC | Age: 36
End: 2021-07-21
Payer: COMMERCIAL

## 2021-07-21 DIAGNOSIS — Z36.89 ENCOUNTER FOR FETAL ANATOMIC SURVEY: ICD-10-CM

## 2021-07-21 DIAGNOSIS — Z34.82 ENCOUNTER FOR SUPERVISION OF OTHER NORMAL PREGNANCY IN SECOND TRIMESTER: ICD-10-CM

## 2021-07-21 DIAGNOSIS — Z36.89 ENCOUNTER FOR ULTRASOUND TO CHECK FETAL GROWTH: Primary | ICD-10-CM

## 2021-07-21 DIAGNOSIS — O09.522 ELDERLY MULTIGRAVIDA IN SECOND TRIMESTER: ICD-10-CM

## 2021-07-21 PROCEDURE — 76811 OB US DETAILED SNGL FETUS: CPT | Mod: S$GLB,,, | Performed by: OBSTETRICS & GYNECOLOGY

## 2021-07-21 PROCEDURE — 76811 PR US, OB FETAL EVAL & EXAM, TRANSABDOM,FIRST GESTATION: ICD-10-PCS | Mod: S$GLB,,, | Performed by: OBSTETRICS & GYNECOLOGY

## 2021-07-27 ENCOUNTER — PATIENT MESSAGE (OUTPATIENT)
Dept: OBSTETRICS AND GYNECOLOGY | Facility: CLINIC | Age: 36
End: 2021-07-27

## 2021-08-05 ENCOUNTER — ROUTINE PRENATAL (OUTPATIENT)
Dept: OBSTETRICS AND GYNECOLOGY | Facility: CLINIC | Age: 36
End: 2021-08-05
Payer: COMMERCIAL

## 2021-08-05 VITALS
BODY MASS INDEX: 34.48 KG/M2 | WEIGHT: 194.69 LBS | DIASTOLIC BLOOD PRESSURE: 68 MMHG | SYSTOLIC BLOOD PRESSURE: 102 MMHG

## 2021-08-05 DIAGNOSIS — Z34.82 ENCOUNTER FOR SUPERVISION OF OTHER NORMAL PREGNANCY IN SECOND TRIMESTER: Primary | ICD-10-CM

## 2021-08-05 PROCEDURE — 0502F PR SUBSEQUENT PRENATAL CARE: ICD-10-PCS | Mod: S$GLB,,, | Performed by: OBSTETRICS & GYNECOLOGY

## 2021-08-05 PROCEDURE — 99999 PR PBB SHADOW E&M-EST. PATIENT-LVL III: ICD-10-PCS | Mod: PBBFAC,,, | Performed by: OBSTETRICS & GYNECOLOGY

## 2021-08-05 PROCEDURE — 99999 PR PBB SHADOW E&M-EST. PATIENT-LVL III: CPT | Mod: PBBFAC,,, | Performed by: OBSTETRICS & GYNECOLOGY

## 2021-08-05 PROCEDURE — 0502F SUBSEQUENT PRENATAL CARE: CPT | Mod: S$GLB,,, | Performed by: OBSTETRICS & GYNECOLOGY

## 2021-08-06 ENCOUNTER — PATIENT MESSAGE (OUTPATIENT)
Dept: OBSTETRICS AND GYNECOLOGY | Facility: CLINIC | Age: 36
End: 2021-08-06

## 2021-08-25 ENCOUNTER — PROCEDURE VISIT (OUTPATIENT)
Dept: MATERNAL FETAL MEDICINE | Facility: CLINIC | Age: 36
End: 2021-08-25
Payer: COMMERCIAL

## 2021-08-25 DIAGNOSIS — Z36.89 ENCOUNTER FOR ULTRASOUND TO ASSESS FETAL GROWTH: Primary | ICD-10-CM

## 2021-08-25 DIAGNOSIS — O09.522 ELDERLY MULTIGRAVIDA IN SECOND TRIMESTER: ICD-10-CM

## 2021-08-25 DIAGNOSIS — Z36.89 ENCOUNTER FOR ULTRASOUND TO CHECK FETAL GROWTH: ICD-10-CM

## 2021-08-25 DIAGNOSIS — Z36.2 ENCOUNTER FOR OTHER ANTENATAL SCREENING FOLLOW-UP: ICD-10-CM

## 2021-08-25 PROCEDURE — 76816 PR  US,PREGNANT UTERUS,F/U,TRANSABD APP: ICD-10-PCS | Mod: S$GLB,,, | Performed by: OBSTETRICS & GYNECOLOGY

## 2021-08-25 PROCEDURE — 76816 OB US FOLLOW-UP PER FETUS: CPT | Mod: S$GLB,,, | Performed by: OBSTETRICS & GYNECOLOGY

## 2021-08-31 ENCOUNTER — PATIENT MESSAGE (OUTPATIENT)
Dept: OBSTETRICS AND GYNECOLOGY | Facility: CLINIC | Age: 36
End: 2021-08-31

## 2021-08-31 ENCOUNTER — PATIENT MESSAGE (OUTPATIENT)
Dept: ADMINISTRATIVE | Facility: OTHER | Age: 36
End: 2021-08-31

## 2021-09-13 ENCOUNTER — PATIENT MESSAGE (OUTPATIENT)
Dept: OBSTETRICS AND GYNECOLOGY | Facility: CLINIC | Age: 36
End: 2021-09-13

## 2021-09-15 ENCOUNTER — ROUTINE PRENATAL (OUTPATIENT)
Dept: OBSTETRICS AND GYNECOLOGY | Facility: CLINIC | Age: 36
End: 2021-09-15
Payer: COMMERCIAL

## 2021-09-15 ENCOUNTER — LAB VISIT (OUTPATIENT)
Dept: LAB | Facility: OTHER | Age: 36
End: 2021-09-15
Attending: OBSTETRICS & GYNECOLOGY
Payer: COMMERCIAL

## 2021-09-15 VITALS
SYSTOLIC BLOOD PRESSURE: 102 MMHG | DIASTOLIC BLOOD PRESSURE: 64 MMHG | BODY MASS INDEX: 37.26 KG/M2 | WEIGHT: 210.31 LBS

## 2021-09-15 DIAGNOSIS — Z34.82 ENCOUNTER FOR SUPERVISION OF OTHER NORMAL PREGNANCY IN SECOND TRIMESTER: ICD-10-CM

## 2021-09-15 DIAGNOSIS — O09.521 ADVANCED MATERNAL AGE IN MULTIGRAVIDA, FIRST TRIMESTER: ICD-10-CM

## 2021-09-15 DIAGNOSIS — O34.219 HISTORY OF CESAREAN SECTION COMPLICATING PREGNANCY: ICD-10-CM

## 2021-09-15 DIAGNOSIS — O09.521 ADVANCED MATERNAL AGE IN MULTIGRAVIDA, FIRST TRIMESTER: Primary | ICD-10-CM

## 2021-09-15 LAB
ABO + RH BLD: NORMAL
BASOPHILS # BLD AUTO: 0.04 K/UL (ref 0–0.2)
BASOPHILS NFR BLD: 0.3 % (ref 0–1.9)
BLD GP AB SCN CELLS X3 SERPL QL: NORMAL
DIFFERENTIAL METHOD: ABNORMAL
EOSINOPHIL # BLD AUTO: 0.1 K/UL (ref 0–0.5)
EOSINOPHIL NFR BLD: 0.5 % (ref 0–8)
ERYTHROCYTE [DISTWIDTH] IN BLOOD BY AUTOMATED COUNT: 13.8 % (ref 11.5–14.5)
GLUCOSE SERPL-MCNC: 100 MG/DL (ref 70–140)
HCT VFR BLD AUTO: 37.7 % (ref 37–48.5)
HGB BLD-MCNC: 12.9 G/DL (ref 12–16)
IMM GRANULOCYTES # BLD AUTO: 0.08 K/UL (ref 0–0.04)
IMM GRANULOCYTES NFR BLD AUTO: 0.6 % (ref 0–0.5)
LYMPHOCYTES # BLD AUTO: 2.4 K/UL (ref 1–4.8)
LYMPHOCYTES NFR BLD: 18.5 % (ref 18–48)
MCH RBC QN AUTO: 31 PG (ref 27–31)
MCHC RBC AUTO-ENTMCNC: 34.2 G/DL (ref 32–36)
MCV RBC AUTO: 91 FL (ref 82–98)
MONOCYTES # BLD AUTO: 0.7 K/UL (ref 0.3–1)
MONOCYTES NFR BLD: 4.9 % (ref 4–15)
NEUTROPHILS # BLD AUTO: 9.9 K/UL (ref 1.8–7.7)
NEUTROPHILS NFR BLD: 75.2 % (ref 38–73)
NRBC BLD-RTO: 0 /100 WBC
PLATELET # BLD AUTO: 285 K/UL (ref 150–450)
PMV BLD AUTO: 9.5 FL (ref 9.2–12.9)
RBC # BLD AUTO: 4.16 M/UL (ref 4–5.4)
WBC # BLD AUTO: 13.2 K/UL (ref 3.9–12.7)

## 2021-09-15 PROCEDURE — 0502F SUBSEQUENT PRENATAL CARE: CPT | Mod: S$GLB,,, | Performed by: OBSTETRICS & GYNECOLOGY

## 2021-09-15 PROCEDURE — 86900 BLOOD TYPING SEROLOGIC ABO: CPT | Performed by: OBSTETRICS & GYNECOLOGY

## 2021-09-15 PROCEDURE — 82950 GLUCOSE TEST: CPT | Performed by: OBSTETRICS & GYNECOLOGY

## 2021-09-15 PROCEDURE — 99999 PR PBB SHADOW E&M-EST. PATIENT-LVL III: ICD-10-PCS | Mod: PBBFAC,,, | Performed by: OBSTETRICS & GYNECOLOGY

## 2021-09-15 PROCEDURE — 0502F PR SUBSEQUENT PRENATAL CARE: ICD-10-PCS | Mod: S$GLB,,, | Performed by: OBSTETRICS & GYNECOLOGY

## 2021-09-15 PROCEDURE — 99999 PR PBB SHADOW E&M-EST. PATIENT-LVL III: CPT | Mod: PBBFAC,,, | Performed by: OBSTETRICS & GYNECOLOGY

## 2021-09-15 PROCEDURE — 85025 COMPLETE CBC W/AUTO DIFF WBC: CPT | Performed by: OBSTETRICS & GYNECOLOGY

## 2021-09-21 ENCOUNTER — PATIENT MESSAGE (OUTPATIENT)
Dept: ADMINISTRATIVE | Facility: OTHER | Age: 36
End: 2021-09-21

## 2021-09-21 ENCOUNTER — CLINICAL SUPPORT (OUTPATIENT)
Dept: OBSTETRICS AND GYNECOLOGY | Facility: CLINIC | Age: 36
End: 2021-09-21
Payer: COMMERCIAL

## 2021-09-21 DIAGNOSIS — O26.899 RH NEGATIVE, ANTEPARTUM, UNSPECIFIED TRIMESTER: ICD-10-CM

## 2021-09-21 DIAGNOSIS — Z23 NEED FOR DIPHTHERIA-TETANUS-PERTUSSIS (TDAP) VACCINE: Primary | ICD-10-CM

## 2021-09-21 DIAGNOSIS — Z67.91 RH NEGATIVE, ANTEPARTUM, UNSPECIFIED TRIMESTER: ICD-10-CM

## 2021-09-21 PROCEDURE — 90471 TDAP VACCINE GREATER THAN OR EQUAL TO 7YO IM: ICD-10-PCS | Mod: 59,S$GLB,, | Performed by: OBSTETRICS & GYNECOLOGY

## 2021-09-21 PROCEDURE — 90715 TDAP VACCINE GREATER THAN OR EQUAL TO 7YO IM: ICD-10-PCS | Mod: S$GLB,,, | Performed by: OBSTETRICS & GYNECOLOGY

## 2021-09-21 PROCEDURE — 90471 IMMUNIZATION ADMIN: CPT | Mod: 59,S$GLB,, | Performed by: OBSTETRICS & GYNECOLOGY

## 2021-09-21 PROCEDURE — 96372 THER/PROPH/DIAG INJ SC/IM: CPT | Mod: S$GLB,,, | Performed by: OBSTETRICS & GYNECOLOGY

## 2021-09-21 PROCEDURE — 96372 RHO (D) IMMUNE GLOBULIN: ICD-10-PCS | Mod: S$GLB,,, | Performed by: OBSTETRICS & GYNECOLOGY

## 2021-09-21 PROCEDURE — 90715 TDAP VACCINE 7 YRS/> IM: CPT | Mod: S$GLB,,, | Performed by: OBSTETRICS & GYNECOLOGY

## 2021-10-12 ENCOUNTER — ROUTINE PRENATAL (OUTPATIENT)
Dept: OBSTETRICS AND GYNECOLOGY | Facility: CLINIC | Age: 36
End: 2021-10-12
Payer: COMMERCIAL

## 2021-10-12 VITALS
SYSTOLIC BLOOD PRESSURE: 128 MMHG | BODY MASS INDEX: 38.27 KG/M2 | WEIGHT: 216.06 LBS | DIASTOLIC BLOOD PRESSURE: 68 MMHG

## 2021-10-12 DIAGNOSIS — O09.521 ADVANCED MATERNAL AGE IN MULTIGRAVIDA, FIRST TRIMESTER: Primary | ICD-10-CM

## 2021-10-12 PROCEDURE — 99999 PR PBB SHADOW E&M-EST. PATIENT-LVL II: ICD-10-PCS | Mod: PBBFAC,,, | Performed by: OBSTETRICS & GYNECOLOGY

## 2021-10-12 PROCEDURE — 99999 PR PBB SHADOW E&M-EST. PATIENT-LVL II: CPT | Mod: PBBFAC,,, | Performed by: OBSTETRICS & GYNECOLOGY

## 2021-10-12 PROCEDURE — 0502F SUBSEQUENT PRENATAL CARE: CPT | Mod: S$GLB,,, | Performed by: OBSTETRICS & GYNECOLOGY

## 2021-10-12 PROCEDURE — 0502F PR SUBSEQUENT PRENATAL CARE: ICD-10-PCS | Mod: S$GLB,,, | Performed by: OBSTETRICS & GYNECOLOGY

## 2021-10-19 ENCOUNTER — PATIENT MESSAGE (OUTPATIENT)
Dept: OBSTETRICS AND GYNECOLOGY | Facility: CLINIC | Age: 36
End: 2021-10-19
Payer: COMMERCIAL

## 2021-10-19 ENCOUNTER — PATIENT MESSAGE (OUTPATIENT)
Dept: ADMINISTRATIVE | Facility: OTHER | Age: 36
End: 2021-10-19
Payer: COMMERCIAL

## 2021-10-19 ENCOUNTER — PATIENT MESSAGE (OUTPATIENT)
Dept: MATERNAL FETAL MEDICINE | Facility: CLINIC | Age: 36
End: 2021-10-19
Payer: COMMERCIAL

## 2021-10-20 ENCOUNTER — ROUTINE PRENATAL (OUTPATIENT)
Dept: OBSTETRICS AND GYNECOLOGY | Facility: CLINIC | Age: 36
End: 2021-10-20
Payer: COMMERCIAL

## 2021-10-20 ENCOUNTER — PROCEDURE VISIT (OUTPATIENT)
Dept: MATERNAL FETAL MEDICINE | Facility: CLINIC | Age: 36
End: 2021-10-20
Payer: COMMERCIAL

## 2021-10-20 VITALS
BODY MASS INDEX: 38.62 KG/M2 | DIASTOLIC BLOOD PRESSURE: 82 MMHG | SYSTOLIC BLOOD PRESSURE: 122 MMHG | WEIGHT: 218.06 LBS

## 2021-10-20 DIAGNOSIS — Z3A.32 32 WEEKS GESTATION OF PREGNANCY: Primary | ICD-10-CM

## 2021-10-20 DIAGNOSIS — Z36.89 ENCOUNTER FOR ULTRASOUND TO ASSESS FETAL GROWTH: ICD-10-CM

## 2021-10-20 DIAGNOSIS — O09.522 ELDERLY MULTIGRAVIDA IN SECOND TRIMESTER: ICD-10-CM

## 2021-10-20 DIAGNOSIS — O09.523 ADVANCED MATERNAL AGE IN MULTIGRAVIDA, THIRD TRIMESTER: ICD-10-CM

## 2021-10-20 PROCEDURE — 99999 PR PBB SHADOW E&M-EST. PATIENT-LVL II: CPT | Mod: PBBFAC,,, | Performed by: STUDENT IN AN ORGANIZED HEALTH CARE EDUCATION/TRAINING PROGRAM

## 2021-10-20 PROCEDURE — 76816 OB US FOLLOW-UP PER FETUS: CPT | Mod: S$GLB,,, | Performed by: OBSTETRICS & GYNECOLOGY

## 2021-10-20 PROCEDURE — 76816 PR  US,PREGNANT UTERUS,F/U,TRANSABD APP: ICD-10-PCS | Mod: S$GLB,,, | Performed by: OBSTETRICS & GYNECOLOGY

## 2021-10-20 PROCEDURE — 0502F SUBSEQUENT PRENATAL CARE: CPT | Mod: S$GLB,,, | Performed by: STUDENT IN AN ORGANIZED HEALTH CARE EDUCATION/TRAINING PROGRAM

## 2021-10-20 PROCEDURE — 99999 PR PBB SHADOW E&M-EST. PATIENT-LVL II: ICD-10-PCS | Mod: PBBFAC,,, | Performed by: STUDENT IN AN ORGANIZED HEALTH CARE EDUCATION/TRAINING PROGRAM

## 2021-10-20 PROCEDURE — 0502F PR SUBSEQUENT PRENATAL CARE: ICD-10-PCS | Mod: S$GLB,,, | Performed by: STUDENT IN AN ORGANIZED HEALTH CARE EDUCATION/TRAINING PROGRAM

## 2021-10-27 ENCOUNTER — ROUTINE PRENATAL (OUTPATIENT)
Dept: OBSTETRICS AND GYNECOLOGY | Facility: CLINIC | Age: 36
End: 2021-10-27
Payer: COMMERCIAL

## 2021-10-27 VITALS
BODY MASS INDEX: 39.05 KG/M2 | SYSTOLIC BLOOD PRESSURE: 110 MMHG | WEIGHT: 220.44 LBS | DIASTOLIC BLOOD PRESSURE: 64 MMHG

## 2021-10-27 DIAGNOSIS — Z3A.32 32 WEEKS GESTATION OF PREGNANCY: Primary | ICD-10-CM

## 2021-10-27 PROCEDURE — 99999 PR PBB SHADOW E&M-EST. PATIENT-LVL III: ICD-10-PCS | Mod: PBBFAC,,, | Performed by: OBSTETRICS & GYNECOLOGY

## 2021-10-27 PROCEDURE — 0502F PR SUBSEQUENT PRENATAL CARE: ICD-10-PCS | Mod: S$GLB,,, | Performed by: OBSTETRICS & GYNECOLOGY

## 2021-10-27 PROCEDURE — 0502F SUBSEQUENT PRENATAL CARE: CPT | Mod: S$GLB,,, | Performed by: OBSTETRICS & GYNECOLOGY

## 2021-10-27 PROCEDURE — 99999 PR PBB SHADOW E&M-EST. PATIENT-LVL III: CPT | Mod: PBBFAC,,, | Performed by: OBSTETRICS & GYNECOLOGY

## 2021-11-03 ENCOUNTER — PATIENT MESSAGE (OUTPATIENT)
Dept: OBSTETRICS AND GYNECOLOGY | Facility: CLINIC | Age: 36
End: 2021-11-03
Payer: COMMERCIAL

## 2021-11-09 ENCOUNTER — ROUTINE PRENATAL (OUTPATIENT)
Dept: OBSTETRICS AND GYNECOLOGY | Facility: CLINIC | Age: 36
End: 2021-11-09
Payer: COMMERCIAL

## 2021-11-09 ENCOUNTER — LAB VISIT (OUTPATIENT)
Dept: LAB | Facility: OTHER | Age: 36
End: 2021-11-09
Attending: OBSTETRICS & GYNECOLOGY
Payer: COMMERCIAL

## 2021-11-09 VITALS
WEIGHT: 225.75 LBS | BODY MASS INDEX: 39.99 KG/M2 | SYSTOLIC BLOOD PRESSURE: 102 MMHG | DIASTOLIC BLOOD PRESSURE: 62 MMHG

## 2021-11-09 DIAGNOSIS — O09.521 ADVANCED MATERNAL AGE IN MULTIGRAVIDA, FIRST TRIMESTER: Primary | ICD-10-CM

## 2021-11-09 DIAGNOSIS — O09.521 ADVANCED MATERNAL AGE IN MULTIGRAVIDA, FIRST TRIMESTER: ICD-10-CM

## 2021-11-09 DIAGNOSIS — Z67.91 RH NEGATIVE STATUS DURING PREGNANCY IN THIRD TRIMESTER: ICD-10-CM

## 2021-11-09 DIAGNOSIS — O26.893 RH NEGATIVE STATUS DURING PREGNANCY IN THIRD TRIMESTER: ICD-10-CM

## 2021-11-09 LAB
BASOPHILS # BLD AUTO: 0.04 K/UL (ref 0–0.2)
BASOPHILS NFR BLD: 0.3 % (ref 0–1.9)
DIFFERENTIAL METHOD: ABNORMAL
EOSINOPHIL # BLD AUTO: 0.1 K/UL (ref 0–0.5)
EOSINOPHIL NFR BLD: 0.5 % (ref 0–8)
ERYTHROCYTE [DISTWIDTH] IN BLOOD BY AUTOMATED COUNT: 13 % (ref 11.5–14.5)
HCT VFR BLD AUTO: 41.1 % (ref 37–48.5)
HGB BLD-MCNC: 13.6 G/DL (ref 12–16)
IMM GRANULOCYTES # BLD AUTO: 0.09 K/UL (ref 0–0.04)
IMM GRANULOCYTES NFR BLD AUTO: 0.8 % (ref 0–0.5)
LYMPHOCYTES # BLD AUTO: 2.4 K/UL (ref 1–4.8)
LYMPHOCYTES NFR BLD: 20.2 % (ref 18–48)
MCH RBC QN AUTO: 30.8 PG (ref 27–31)
MCHC RBC AUTO-ENTMCNC: 33.1 G/DL (ref 32–36)
MCV RBC AUTO: 93 FL (ref 82–98)
MONOCYTES # BLD AUTO: 0.7 K/UL (ref 0.3–1)
MONOCYTES NFR BLD: 5.8 % (ref 4–15)
NEUTROPHILS # BLD AUTO: 8.7 K/UL (ref 1.8–7.7)
NEUTROPHILS NFR BLD: 72.4 % (ref 38–73)
NRBC BLD-RTO: 0 /100 WBC
PLATELET # BLD AUTO: 279 K/UL (ref 150–450)
PMV BLD AUTO: 10 FL (ref 9.2–12.9)
RBC # BLD AUTO: 4.41 M/UL (ref 4–5.4)
WBC # BLD AUTO: 11.98 K/UL (ref 3.9–12.7)

## 2021-11-09 PROCEDURE — 86592 SYPHILIS TEST NON-TREP QUAL: CPT | Performed by: OBSTETRICS & GYNECOLOGY

## 2021-11-09 PROCEDURE — 85025 COMPLETE CBC W/AUTO DIFF WBC: CPT | Performed by: OBSTETRICS & GYNECOLOGY

## 2021-11-09 PROCEDURE — 36415 COLL VENOUS BLD VENIPUNCTURE: CPT | Performed by: OBSTETRICS & GYNECOLOGY

## 2021-11-09 PROCEDURE — 99999 PR PBB SHADOW E&M-EST. PATIENT-LVL II: ICD-10-PCS | Mod: PBBFAC,,, | Performed by: OBSTETRICS & GYNECOLOGY

## 2021-11-09 PROCEDURE — 99999 PR PBB SHADOW E&M-EST. PATIENT-LVL II: CPT | Mod: PBBFAC,,, | Performed by: OBSTETRICS & GYNECOLOGY

## 2021-11-09 PROCEDURE — 0502F PR SUBSEQUENT PRENATAL CARE: ICD-10-PCS | Mod: S$GLB,,, | Performed by: OBSTETRICS & GYNECOLOGY

## 2021-11-09 PROCEDURE — 0502F SUBSEQUENT PRENATAL CARE: CPT | Mod: S$GLB,,, | Performed by: OBSTETRICS & GYNECOLOGY

## 2021-11-09 PROCEDURE — 87389 HIV-1 AG W/HIV-1&-2 AB AG IA: CPT | Performed by: OBSTETRICS & GYNECOLOGY

## 2021-11-10 ENCOUNTER — IMMUNIZATION (OUTPATIENT)
Dept: PHARMACY | Facility: CLINIC | Age: 36
End: 2021-11-10
Payer: COMMERCIAL

## 2021-11-10 LAB
HIV 1+2 AB+HIV1 P24 AG SERPL QL IA: NEGATIVE
RPR SER QL: NORMAL

## 2021-11-15 ENCOUNTER — PATIENT MESSAGE (OUTPATIENT)
Dept: OBSTETRICS AND GYNECOLOGY | Facility: CLINIC | Age: 36
End: 2021-11-15
Payer: COMMERCIAL

## 2021-11-23 ENCOUNTER — ROUTINE PRENATAL (OUTPATIENT)
Dept: OBSTETRICS AND GYNECOLOGY | Facility: CLINIC | Age: 36
End: 2021-11-23
Payer: COMMERCIAL

## 2021-11-23 VITALS
DIASTOLIC BLOOD PRESSURE: 62 MMHG | WEIGHT: 226.63 LBS | SYSTOLIC BLOOD PRESSURE: 112 MMHG | BODY MASS INDEX: 40.15 KG/M2

## 2021-11-23 DIAGNOSIS — O09.521 ADVANCED MATERNAL AGE IN MULTIGRAVIDA, FIRST TRIMESTER: Primary | ICD-10-CM

## 2021-11-23 PROCEDURE — 0502F PR SUBSEQUENT PRENATAL CARE: ICD-10-PCS | Mod: CPTII,S$GLB,, | Performed by: OBSTETRICS & GYNECOLOGY

## 2021-11-23 PROCEDURE — 87081 CULTURE SCREEN ONLY: CPT | Performed by: OBSTETRICS & GYNECOLOGY

## 2021-11-23 PROCEDURE — 99999 PR PBB SHADOW E&M-EST. PATIENT-LVL II: ICD-10-PCS | Mod: PBBFAC,,, | Performed by: OBSTETRICS & GYNECOLOGY

## 2021-11-23 PROCEDURE — 99999 PR PBB SHADOW E&M-EST. PATIENT-LVL II: CPT | Mod: PBBFAC,,, | Performed by: OBSTETRICS & GYNECOLOGY

## 2021-11-23 PROCEDURE — 0502F SUBSEQUENT PRENATAL CARE: CPT | Mod: CPTII,S$GLB,, | Performed by: OBSTETRICS & GYNECOLOGY

## 2021-11-26 LAB — BACTERIA SPEC AEROBE CULT: NORMAL

## 2021-11-30 ENCOUNTER — ROUTINE PRENATAL (OUTPATIENT)
Dept: OBSTETRICS AND GYNECOLOGY | Facility: CLINIC | Age: 36
End: 2021-11-30
Payer: COMMERCIAL

## 2021-11-30 VITALS
BODY MASS INDEX: 40.19 KG/M2 | WEIGHT: 226.88 LBS | SYSTOLIC BLOOD PRESSURE: 116 MMHG | DIASTOLIC BLOOD PRESSURE: 74 MMHG

## 2021-11-30 DIAGNOSIS — O09.521 ADVANCED MATERNAL AGE IN MULTIGRAVIDA, FIRST TRIMESTER: Primary | ICD-10-CM

## 2021-11-30 DIAGNOSIS — Z01.818 PRE-OP TESTING: ICD-10-CM

## 2021-11-30 PROCEDURE — 99999 PR PBB SHADOW E&M-EST. PATIENT-LVL II: CPT | Mod: PBBFAC,,, | Performed by: OBSTETRICS & GYNECOLOGY

## 2021-11-30 PROCEDURE — 0502F SUBSEQUENT PRENATAL CARE: CPT | Mod: CPTII,S$GLB,, | Performed by: OBSTETRICS & GYNECOLOGY

## 2021-11-30 PROCEDURE — 99999 PR PBB SHADOW E&M-EST. PATIENT-LVL II: ICD-10-PCS | Mod: PBBFAC,,, | Performed by: OBSTETRICS & GYNECOLOGY

## 2021-11-30 PROCEDURE — 0502F PR SUBSEQUENT PRENATAL CARE: ICD-10-PCS | Mod: CPTII,S$GLB,, | Performed by: OBSTETRICS & GYNECOLOGY

## 2021-11-30 RX ORDER — SODIUM CHLORIDE, SODIUM LACTATE, POTASSIUM CHLORIDE, CALCIUM CHLORIDE 600; 310; 30; 20 MG/100ML; MG/100ML; MG/100ML; MG/100ML
INJECTION, SOLUTION INTRAVENOUS CONTINUOUS
Status: DISCONTINUED | OUTPATIENT
Start: 2021-11-30 | End: 2021-12-12 | Stop reason: HOSPADM

## 2021-11-30 RX ORDER — OXYTOCIN/RINGER'S LACTATE 30/500 ML
95 PLASTIC BAG, INJECTION (ML) INTRAVENOUS ONCE
Status: DISCONTINUED | OUTPATIENT
Start: 2021-11-30 | End: 2021-12-12 | Stop reason: HOSPADM

## 2021-11-30 RX ORDER — OXYTOCIN/RINGER'S LACTATE 30/500 ML
334 PLASTIC BAG, INJECTION (ML) INTRAVENOUS ONCE
Status: DISCONTINUED | OUTPATIENT
Start: 2021-11-30 | End: 2021-12-12 | Stop reason: HOSPADM

## 2021-11-30 RX ORDER — METHYLERGONOVINE MALEATE 0.2 MG/ML
200 INJECTION INTRAVENOUS
Status: DISCONTINUED | OUTPATIENT
Start: 2021-11-30 | End: 2021-12-12 | Stop reason: HOSPADM

## 2021-11-30 RX ORDER — MUPIROCIN 20 MG/G
OINTMENT TOPICAL
Status: CANCELLED | OUTPATIENT
Start: 2021-11-30

## 2021-11-30 RX ORDER — FAMOTIDINE 10 MG/ML
20 INJECTION INTRAVENOUS
Status: DISCONTINUED | OUTPATIENT
Start: 2021-11-30 | End: 2021-12-12 | Stop reason: HOSPADM

## 2021-11-30 RX ORDER — CARBOPROST TROMETHAMINE 250 UG/ML
250 INJECTION, SOLUTION INTRAMUSCULAR
Status: DISCONTINUED | OUTPATIENT
Start: 2021-11-30 | End: 2021-12-12 | Stop reason: HOSPADM

## 2021-11-30 RX ORDER — MISOPROSTOL 100 UG/1
800 TABLET ORAL
Status: DISCONTINUED | OUTPATIENT
Start: 2021-11-30 | End: 2021-12-12 | Stop reason: HOSPADM

## 2021-11-30 RX ORDER — SODIUM CITRATE AND CITRIC ACID MONOHYDRATE 334; 500 MG/5ML; MG/5ML
30 SOLUTION ORAL
Status: DISCONTINUED | OUTPATIENT
Start: 2021-11-30 | End: 2021-12-12 | Stop reason: HOSPADM

## 2021-12-07 ENCOUNTER — LAB VISIT (OUTPATIENT)
Dept: PRIMARY CARE CLINIC | Facility: CLINIC | Age: 36
End: 2021-12-07
Payer: COMMERCIAL

## 2021-12-07 DIAGNOSIS — Z01.818 PRE-OP TESTING: ICD-10-CM

## 2021-12-07 PROCEDURE — U0003 INFECTIOUS AGENT DETECTION BY NUCLEIC ACID (DNA OR RNA); SEVERE ACUTE RESPIRATORY SYNDROME CORONAVIRUS 2 (SARS-COV-2) (CORONAVIRUS DISEASE [COVID-19]), AMPLIFIED PROBE TECHNIQUE, MAKING USE OF HIGH THROUGHPUT TECHNOLOGIES AS DESCRIBED BY CMS-2020-01-R: HCPCS | Performed by: OBSTETRICS & GYNECOLOGY

## 2021-12-07 PROCEDURE — U0005 INFEC AGEN DETEC AMPLI PROBE: HCPCS | Performed by: OBSTETRICS & GYNECOLOGY

## 2021-12-08 LAB
SARS-COV-2 RNA RESP QL NAA+PROBE: NOT DETECTED
SARS-COV-2- CYCLE NUMBER: NORMAL

## 2021-12-10 ENCOUNTER — HOSPITAL ENCOUNTER (INPATIENT)
Facility: OTHER | Age: 36
LOS: 2 days | Discharge: HOME | End: 2021-12-12
Attending: OBSTETRICS & GYNECOLOGY | Admitting: OBSTETRICS & GYNECOLOGY
Payer: COMMERCIAL

## 2021-12-10 ENCOUNTER — ANESTHESIA (OUTPATIENT)
Dept: OBSTETRICS AND GYNECOLOGY | Facility: OTHER | Age: 36
End: 2021-12-10
Payer: COMMERCIAL

## 2021-12-10 ENCOUNTER — ANESTHESIA EVENT (OUTPATIENT)
Dept: OBSTETRICS AND GYNECOLOGY | Facility: OTHER | Age: 36
End: 2021-12-10
Payer: COMMERCIAL

## 2021-12-10 DIAGNOSIS — O09.521 ADVANCED MATERNAL AGE IN MULTIGRAVIDA, FIRST TRIMESTER: Primary | ICD-10-CM

## 2021-12-10 DIAGNOSIS — Z01.818 PRE-OP TESTING: ICD-10-CM

## 2021-12-10 LAB
ABO + RH BLD: NORMAL
BASOPHILS # BLD AUTO: 0.04 K/UL (ref 0–0.2)
BASOPHILS NFR BLD: 0.3 % (ref 0–1.9)
BLD GP AB SCN CELLS X3 SERPL QL: NORMAL
DIFFERENTIAL METHOD: ABNORMAL
EOSINOPHIL # BLD AUTO: 0.1 K/UL (ref 0–0.5)
EOSINOPHIL NFR BLD: 0.4 % (ref 0–8)
ERYTHROCYTE [DISTWIDTH] IN BLOOD BY AUTOMATED COUNT: 13 % (ref 11.5–14.5)
HCT VFR BLD AUTO: 40.7 % (ref 37–48.5)
HGB BLD-MCNC: 13.6 G/DL (ref 12–16)
IMM GRANULOCYTES # BLD AUTO: 0.07 K/UL (ref 0–0.04)
IMM GRANULOCYTES NFR BLD AUTO: 0.6 % (ref 0–0.5)
LYMPHOCYTES # BLD AUTO: 2.2 K/UL (ref 1–4.8)
LYMPHOCYTES NFR BLD: 17.5 % (ref 18–48)
MCH RBC QN AUTO: 30.2 PG (ref 27–31)
MCHC RBC AUTO-ENTMCNC: 33.4 G/DL (ref 32–36)
MCV RBC AUTO: 90 FL (ref 82–98)
MONOCYTES # BLD AUTO: 0.8 K/UL (ref 0.3–1)
MONOCYTES NFR BLD: 6.1 % (ref 4–15)
NEUTROPHILS # BLD AUTO: 9.2 K/UL (ref 1.8–7.7)
NEUTROPHILS NFR BLD: 75.1 % (ref 38–73)
NRBC BLD-RTO: 0 /100 WBC
PLATELET # BLD AUTO: 264 K/UL (ref 150–450)
PMV BLD AUTO: 9.8 FL (ref 9.2–12.9)
RBC # BLD AUTO: 4.5 M/UL (ref 4–5.4)
WBC # BLD AUTO: 12.3 K/UL (ref 3.9–12.7)

## 2021-12-10 PROCEDURE — 37000008 HC ANESTHESIA 1ST 15 MINUTES: Performed by: OBSTETRICS & GYNECOLOGY

## 2021-12-10 PROCEDURE — 59510 PR FULL ROUT OBSTE CARE,CESAREAN DELIV: ICD-10-PCS | Mod: ,,, | Performed by: OBSTETRICS & GYNECOLOGY

## 2021-12-10 PROCEDURE — 86900 BLOOD TYPING SEROLOGIC ABO: CPT | Performed by: STUDENT IN AN ORGANIZED HEALTH CARE EDUCATION/TRAINING PROGRAM

## 2021-12-10 PROCEDURE — 37000009 HC ANESTHESIA EA ADD 15 MINS: Performed by: OBSTETRICS & GYNECOLOGY

## 2021-12-10 PROCEDURE — 25000003 PHARM REV CODE 250: Performed by: STUDENT IN AN ORGANIZED HEALTH CARE EDUCATION/TRAINING PROGRAM

## 2021-12-10 PROCEDURE — 63600175 PHARM REV CODE 636 W HCPCS: Performed by: STUDENT IN AN ORGANIZED HEALTH CARE EDUCATION/TRAINING PROGRAM

## 2021-12-10 PROCEDURE — 71000039 HC RECOVERY, EACH ADD'L HOUR: Performed by: OBSTETRICS & GYNECOLOGY

## 2021-12-10 PROCEDURE — 59510 CESAREAN DELIVERY: CPT | Mod: ,,, | Performed by: OBSTETRICS & GYNECOLOGY

## 2021-12-10 PROCEDURE — 59514 CESAREAN DELIVERY ONLY: CPT | Mod: ,,, | Performed by: ANESTHESIOLOGY

## 2021-12-10 PROCEDURE — 85025 COMPLETE CBC W/AUTO DIFF WBC: CPT | Performed by: STUDENT IN AN ORGANIZED HEALTH CARE EDUCATION/TRAINING PROGRAM

## 2021-12-10 PROCEDURE — 71000033 HC RECOVERY, INTIAL HOUR: Performed by: OBSTETRICS & GYNECOLOGY

## 2021-12-10 PROCEDURE — 36004725 HC OB OR TIME LEV III - EA ADD 15 MIN: Performed by: OBSTETRICS & GYNECOLOGY

## 2021-12-10 PROCEDURE — 59514 PRA REAN DELIVERY ONLY: ICD-10-PCS | Mod: ,,, | Performed by: ANESTHESIOLOGY

## 2021-12-10 PROCEDURE — 36004724 HC OB OR TIME LEV III - 1ST 15 MIN: Performed by: OBSTETRICS & GYNECOLOGY

## 2021-12-10 PROCEDURE — 11000001 HC ACUTE MED/SURG PRIVATE ROOM

## 2021-12-10 RX ORDER — OXYCODONE HYDROCHLORIDE 5 MG/1
10 TABLET ORAL EVERY 4 HOURS PRN
Status: DISCONTINUED | OUTPATIENT
Start: 2021-12-10 | End: 2021-12-10

## 2021-12-10 RX ORDER — ACETAMINOPHEN 325 MG/1
650 TABLET ORAL EVERY 6 HOURS
Status: DISCONTINUED | OUTPATIENT
Start: 2021-12-10 | End: 2021-12-10

## 2021-12-10 RX ORDER — ONDANSETRON 2 MG/ML
INJECTION INTRAMUSCULAR; INTRAVENOUS
Status: DISCONTINUED | OUTPATIENT
Start: 2021-12-10 | End: 2021-12-10

## 2021-12-10 RX ORDER — METHYLERGONOVINE MALEATE 0.2 MG/ML
200 INJECTION INTRAVENOUS ONCE AS NEEDED
Status: DISCONTINUED | OUTPATIENT
Start: 2021-12-10 | End: 2021-12-12 | Stop reason: HOSPADM

## 2021-12-10 RX ORDER — MORPHINE SULFATE 1 MG/ML
INJECTION, SOLUTION EPIDURAL; INTRATHECAL; INTRAVENOUS
Status: DISCONTINUED | OUTPATIENT
Start: 2021-12-10 | End: 2021-12-10

## 2021-12-10 RX ORDER — OXYTOCIN/RINGER'S LACTATE 30/500 ML
95 PLASTIC BAG, INJECTION (ML) INTRAVENOUS CONTINUOUS
Status: DISCONTINUED | OUTPATIENT
Start: 2021-12-10 | End: 2021-12-12 | Stop reason: HOSPADM

## 2021-12-10 RX ORDER — CARBOPROST TROMETHAMINE 250 UG/ML
250 INJECTION, SOLUTION INTRAMUSCULAR
Status: DISCONTINUED | OUTPATIENT
Start: 2021-12-10 | End: 2021-12-12 | Stop reason: HOSPADM

## 2021-12-10 RX ORDER — FAMOTIDINE 10 MG/ML
20 INJECTION INTRAVENOUS
Status: COMPLETED | OUTPATIENT
Start: 2021-12-10 | End: 2021-12-10

## 2021-12-10 RX ORDER — ACETAMINOPHEN 325 MG/1
650 TABLET ORAL
Status: DISCONTINUED | OUTPATIENT
Start: 2021-12-10 | End: 2021-12-12 | Stop reason: HOSPADM

## 2021-12-10 RX ORDER — PHENYLEPHRINE HCL IN 0.9% NACL 1 MG/10 ML
SYRINGE (ML) INTRAVENOUS
Status: DISCONTINUED | OUTPATIENT
Start: 2021-12-10 | End: 2021-12-10

## 2021-12-10 RX ORDER — OXYCODONE HYDROCHLORIDE 5 MG/1
5 TABLET ORAL EVERY 4 HOURS PRN
Status: DISCONTINUED | OUTPATIENT
Start: 2021-12-10 | End: 2021-12-10

## 2021-12-10 RX ORDER — ONDANSETRON 2 MG/ML
4 INJECTION INTRAMUSCULAR; INTRAVENOUS EVERY 6 HOURS PRN
Status: DISCONTINUED | OUTPATIENT
Start: 2021-12-10 | End: 2021-12-10

## 2021-12-10 RX ORDER — OXYCODONE HYDROCHLORIDE 5 MG/1
5 TABLET ORAL EVERY 4 HOURS PRN
Status: DISCONTINUED | OUTPATIENT
Start: 2021-12-10 | End: 2021-12-12 | Stop reason: HOSPADM

## 2021-12-10 RX ORDER — PROCHLORPERAZINE EDISYLATE 5 MG/ML
5 INJECTION INTRAMUSCULAR; INTRAVENOUS EVERY 6 HOURS PRN
Status: DISCONTINUED | OUTPATIENT
Start: 2021-12-10 | End: 2021-12-10

## 2021-12-10 RX ORDER — DEXAMETHASONE SODIUM PHOSPHATE 4 MG/ML
INJECTION, SOLUTION INTRA-ARTICULAR; INTRALESIONAL; INTRAMUSCULAR; INTRAVENOUS; SOFT TISSUE
Status: DISCONTINUED | OUTPATIENT
Start: 2021-12-10 | End: 2021-12-10

## 2021-12-10 RX ORDER — PROCHLORPERAZINE EDISYLATE 5 MG/ML
5 INJECTION INTRAMUSCULAR; INTRAVENOUS EVERY 6 HOURS PRN
Status: DISCONTINUED | OUTPATIENT
Start: 2021-12-10 | End: 2021-12-12 | Stop reason: HOSPADM

## 2021-12-10 RX ORDER — SODIUM CHLORIDE, SODIUM LACTATE, POTASSIUM CHLORIDE, CALCIUM CHLORIDE 600; 310; 30; 20 MG/100ML; MG/100ML; MG/100ML; MG/100ML
INJECTION, SOLUTION INTRAVENOUS CONTINUOUS
Status: DISCONTINUED | OUTPATIENT
Start: 2021-12-10 | End: 2021-12-12 | Stop reason: HOSPADM

## 2021-12-10 RX ORDER — KETOROLAC TROMETHAMINE 30 MG/ML
30 INJECTION, SOLUTION INTRAMUSCULAR; INTRAVENOUS EVERY 6 HOURS
Status: DISCONTINUED | OUTPATIENT
Start: 2021-12-10 | End: 2021-12-10

## 2021-12-10 RX ORDER — ONDANSETRON 8 MG/1
8 TABLET, ORALLY DISINTEGRATING ORAL EVERY 8 HOURS PRN
Status: DISCONTINUED | OUTPATIENT
Start: 2021-12-10 | End: 2021-12-12 | Stop reason: HOSPADM

## 2021-12-10 RX ORDER — CEFAZOLIN SODIUM 2 G/50ML
2 SOLUTION INTRAVENOUS
Status: DISCONTINUED | OUTPATIENT
Start: 2021-12-10 | End: 2021-12-12 | Stop reason: HOSPADM

## 2021-12-10 RX ORDER — OXYCODONE HYDROCHLORIDE 5 MG/1
10 TABLET ORAL EVERY 4 HOURS PRN
Status: DISCONTINUED | OUTPATIENT
Start: 2021-12-10 | End: 2021-12-12 | Stop reason: HOSPADM

## 2021-12-10 RX ORDER — ACETAMINOPHEN 500 MG
1000 TABLET ORAL
Status: COMPLETED | OUTPATIENT
Start: 2021-12-10 | End: 2021-12-10

## 2021-12-10 RX ORDER — BUPIVACAINE HYDROCHLORIDE 7.5 MG/ML
INJECTION, SOLUTION INTRASPINAL
Status: DISCONTINUED | OUTPATIENT
Start: 2021-12-10 | End: 2021-12-10

## 2021-12-10 RX ORDER — PRENATAL WITH FERROUS FUM AND FOLIC ACID 3080; 920; 120; 400; 22; 1.84; 3; 20; 10; 1; 12; 200; 27; 25; 2 [IU]/1; [IU]/1; MG/1; [IU]/1; MG/1; MG/1; MG/1; MG/1; MG/1; MG/1; UG/1; MG/1; MG/1; MG/1; MG/1
1 TABLET ORAL DAILY
Status: DISCONTINUED | OUTPATIENT
Start: 2021-12-10 | End: 2021-12-12 | Stop reason: HOSPADM

## 2021-12-10 RX ORDER — SIMETHICONE 80 MG
1 TABLET,CHEWABLE ORAL EVERY 6 HOURS PRN
Status: DISCONTINUED | OUTPATIENT
Start: 2021-12-10 | End: 2021-12-12 | Stop reason: HOSPADM

## 2021-12-10 RX ORDER — CEFAZOLIN SODIUM 1 G/3ML
INJECTION, POWDER, FOR SOLUTION INTRAMUSCULAR; INTRAVENOUS
Status: DISCONTINUED | OUTPATIENT
Start: 2021-12-10 | End: 2021-12-10

## 2021-12-10 RX ORDER — MISOPROSTOL 200 UG/1
800 TABLET ORAL ONCE AS NEEDED
Status: DISCONTINUED | OUTPATIENT
Start: 2021-12-10 | End: 2021-12-12 | Stop reason: HOSPADM

## 2021-12-10 RX ORDER — SODIUM CITRATE AND CITRIC ACID MONOHYDRATE 334; 500 MG/5ML; MG/5ML
30 SOLUTION ORAL
Status: COMPLETED | OUTPATIENT
Start: 2021-12-10 | End: 2021-12-10

## 2021-12-10 RX ORDER — DIPHENHYDRAMINE HYDROCHLORIDE 50 MG/ML
12.5 INJECTION INTRAMUSCULAR; INTRAVENOUS
Status: DISCONTINUED | OUTPATIENT
Start: 2021-12-10 | End: 2021-12-12 | Stop reason: HOSPADM

## 2021-12-10 RX ORDER — AMOXICILLIN 250 MG
1 CAPSULE ORAL NIGHTLY PRN
Status: DISCONTINUED | OUTPATIENT
Start: 2021-12-10 | End: 2021-12-12 | Stop reason: HOSPADM

## 2021-12-10 RX ORDER — DIPHENHYDRAMINE HCL 25 MG
25 CAPSULE ORAL EVERY 6 HOURS PRN
Status: DISCONTINUED | OUTPATIENT
Start: 2021-12-10 | End: 2021-12-12 | Stop reason: HOSPADM

## 2021-12-10 RX ORDER — TRANEXAMIC ACID 100 MG/ML
1000 INJECTION, SOLUTION INTRAVENOUS ONCE AS NEEDED
Status: DISCONTINUED | OUTPATIENT
Start: 2021-12-10 | End: 2021-12-12 | Stop reason: HOSPADM

## 2021-12-10 RX ORDER — MUPIROCIN 20 MG/G
OINTMENT TOPICAL
Status: DISCONTINUED | OUTPATIENT
Start: 2021-12-10 | End: 2021-12-10

## 2021-12-10 RX ORDER — HYDROCORTISONE 25 MG/G
CREAM TOPICAL 3 TIMES DAILY PRN
Status: DISCONTINUED | OUTPATIENT
Start: 2021-12-10 | End: 2021-12-12 | Stop reason: HOSPADM

## 2021-12-10 RX ORDER — NALBUPHINE HYDROCHLORIDE 10 MG/ML
5 INJECTION, SOLUTION INTRAMUSCULAR; INTRAVENOUS; SUBCUTANEOUS ONCE AS NEEDED
Status: DISCONTINUED | OUTPATIENT
Start: 2021-12-10 | End: 2021-12-12 | Stop reason: HOSPADM

## 2021-12-10 RX ORDER — ONDANSETRON 2 MG/ML
4 INJECTION INTRAMUSCULAR; INTRAVENOUS EVERY 6 HOURS PRN
Status: DISCONTINUED | OUTPATIENT
Start: 2021-12-10 | End: 2021-12-12 | Stop reason: HOSPADM

## 2021-12-10 RX ORDER — FENTANYL CITRATE 50 UG/ML
INJECTION, SOLUTION INTRAMUSCULAR; INTRAVENOUS
Status: DISCONTINUED | OUTPATIENT
Start: 2021-12-10 | End: 2021-12-10

## 2021-12-10 RX ORDER — IBUPROFEN 400 MG/1
800 TABLET ORAL
Status: DISCONTINUED | OUTPATIENT
Start: 2021-12-11 | End: 2021-12-12 | Stop reason: HOSPADM

## 2021-12-10 RX ORDER — KETOROLAC TROMETHAMINE 30 MG/ML
30 INJECTION, SOLUTION INTRAMUSCULAR; INTRAVENOUS
Status: COMPLETED | OUTPATIENT
Start: 2021-12-10 | End: 2021-12-11

## 2021-12-10 RX ORDER — DOCUSATE SODIUM 100 MG/1
200 CAPSULE, LIQUID FILLED ORAL 2 TIMES DAILY
Status: DISCONTINUED | OUTPATIENT
Start: 2021-12-10 | End: 2021-12-12 | Stop reason: HOSPADM

## 2021-12-10 RX ADMIN — Medication 100 MCG: at 08:12

## 2021-12-10 RX ADMIN — DOCUSATE SODIUM 200 MG: 100 CAPSULE ORAL at 08:12

## 2021-12-10 RX ADMIN — ACETAMINOPHEN 650 MG: 325 TABLET, FILM COATED ORAL at 02:12

## 2021-12-10 RX ADMIN — DEXAMETHASONE SODIUM PHOSPHATE 4 MG: 4 INJECTION INTRA-ARTICULAR; INTRALESIONAL; INTRAMUSCULAR; INTRAVENOUS; SOFT TISSUE at 08:12

## 2021-12-10 RX ADMIN — KETOROLAC TROMETHAMINE 30 MG: 30 INJECTION, SOLUTION INTRAMUSCULAR at 09:12

## 2021-12-10 RX ADMIN — KETOROLAC TROMETHAMINE 30 MG: 30 INJECTION, SOLUTION INTRAMUSCULAR at 10:12

## 2021-12-10 RX ADMIN — CEFAZOLIN 2 G: 330 INJECTION, POWDER, FOR SOLUTION INTRAMUSCULAR; INTRAVENOUS at 08:12

## 2021-12-10 RX ADMIN — ONDANSETRON 4 MG: 2 INJECTION INTRAMUSCULAR; INTRAVENOUS at 08:12

## 2021-12-10 RX ADMIN — SODIUM CITRATE AND CITRIC ACID MONOHYDRATE 30 ML: 500; 334 SOLUTION ORAL at 07:12

## 2021-12-10 RX ADMIN — ACETAMINOPHEN 650 MG: 325 TABLET, FILM COATED ORAL at 08:12

## 2021-12-10 RX ADMIN — Medication 6 UNITS: at 08:12

## 2021-12-10 RX ADMIN — ACETAMINOPHEN 1000 MG: 500 TABLET, FILM COATED ORAL at 07:12

## 2021-12-10 RX ADMIN — PHENYLEPHRINE HYDROCHLORIDE 0.5 MCG/KG/MIN: 10 INJECTION INTRAVENOUS at 08:12

## 2021-12-10 RX ADMIN — OXYCODONE 5 MG: 5 TABLET ORAL at 10:12

## 2021-12-10 RX ADMIN — FENTANYL CITRATE 10 MCG: 50 INJECTION INTRAMUSCULAR; INTRAVENOUS at 08:12

## 2021-12-10 RX ADMIN — OXYCODONE 10 MG: 5 TABLET ORAL at 11:12

## 2021-12-10 RX ADMIN — MORPHINE SULFATE 0.1 MG: 1 INJECTION EPIDURAL; INTRATHECAL; INTRAVENOUS at 08:12

## 2021-12-10 RX ADMIN — SODIUM CHLORIDE, SODIUM LACTATE, POTASSIUM CHLORIDE, AND CALCIUM CHLORIDE: 600; 310; 30; 20 INJECTION, SOLUTION INTRAVENOUS at 08:12

## 2021-12-10 RX ADMIN — FAMOTIDINE 20 MG: 10 INJECTION INTRAVENOUS at 07:12

## 2021-12-10 RX ADMIN — Medication 95 MILLI-UNITS/MIN: at 09:12

## 2021-12-10 RX ADMIN — SODIUM CHLORIDE, SODIUM LACTATE, POTASSIUM CHLORIDE, AND CALCIUM CHLORIDE: 600; 310; 30; 20 INJECTION, SOLUTION INTRAVENOUS at 09:12

## 2021-12-10 RX ADMIN — KETOROLAC TROMETHAMINE 30 MG: 30 INJECTION, SOLUTION INTRAMUSCULAR at 02:12

## 2021-12-10 RX ADMIN — BUPIVACAINE HYDROCHLORIDE IN DEXTROSE 1.6 ML: 7.5 INJECTION, SOLUTION SUBARACHNOID at 08:12

## 2021-12-11 LAB
ABO + RH BLD: NORMAL
BASOPHILS # BLD AUTO: 0.04 K/UL (ref 0–0.2)
BASOPHILS NFR BLD: 0.3 % (ref 0–1.9)
BLD GP AB SCN CELLS X3 SERPL QL: NORMAL
DIFFERENTIAL METHOD: ABNORMAL
EOSINOPHIL # BLD AUTO: 0.1 K/UL (ref 0–0.5)
EOSINOPHIL NFR BLD: 0.3 % (ref 0–8)
ERYTHROCYTE [DISTWIDTH] IN BLOOD BY AUTOMATED COUNT: 13.1 % (ref 11.5–14.5)
FETAL CELL SCN BLD QL ROSETTE: NORMAL
HCT VFR BLD AUTO: 33.7 % (ref 37–48.5)
HGB BLD-MCNC: 11.4 G/DL (ref 12–16)
IMM GRANULOCYTES # BLD AUTO: 0.11 K/UL (ref 0–0.04)
IMM GRANULOCYTES NFR BLD AUTO: 0.7 % (ref 0–0.5)
INJECT RH IG VOL PATIENT: NORMAL ML
LYMPHOCYTES # BLD AUTO: 2.8 K/UL (ref 1–4.8)
LYMPHOCYTES NFR BLD: 18.2 % (ref 18–48)
MCH RBC QN AUTO: 30.8 PG (ref 27–31)
MCHC RBC AUTO-ENTMCNC: 33.8 G/DL (ref 32–36)
MCV RBC AUTO: 91 FL (ref 82–98)
MONOCYTES # BLD AUTO: 1 K/UL (ref 0.3–1)
MONOCYTES NFR BLD: 6.7 % (ref 4–15)
NEUTROPHILS # BLD AUTO: 11.3 K/UL (ref 1.8–7.7)
NEUTROPHILS NFR BLD: 73.8 % (ref 38–73)
NRBC BLD-RTO: 0 /100 WBC
PLATELET # BLD AUTO: 211 K/UL (ref 150–450)
PMV BLD AUTO: 9.6 FL (ref 9.2–12.9)
RBC # BLD AUTO: 3.7 M/UL (ref 4–5.4)
WBC # BLD AUTO: 15.28 K/UL (ref 3.9–12.7)

## 2021-12-11 PROCEDURE — 86900 BLOOD TYPING SEROLOGIC ABO: CPT | Performed by: OBSTETRICS & GYNECOLOGY

## 2021-12-11 PROCEDURE — 63600519 RHOGAM PHARM REV CODE 636 ALT 250 W HCPCS: Performed by: OBSTETRICS & GYNECOLOGY

## 2021-12-11 PROCEDURE — 63600175 PHARM REV CODE 636 W HCPCS: Performed by: STUDENT IN AN ORGANIZED HEALTH CARE EDUCATION/TRAINING PROGRAM

## 2021-12-11 PROCEDURE — 85025 COMPLETE CBC W/AUTO DIFF WBC: CPT | Performed by: OBSTETRICS & GYNECOLOGY

## 2021-12-11 PROCEDURE — 85461 HEMOGLOBIN FETAL: CPT | Performed by: OBSTETRICS & GYNECOLOGY

## 2021-12-11 PROCEDURE — 11000001 HC ACUTE MED/SURG PRIVATE ROOM

## 2021-12-11 PROCEDURE — 25000003 PHARM REV CODE 250: Performed by: STUDENT IN AN ORGANIZED HEALTH CARE EDUCATION/TRAINING PROGRAM

## 2021-12-11 RX ADMIN — HUMAN RHO(D) IMMUNE GLOBULIN 300 MCG: 300 INJECTION, SOLUTION INTRAMUSCULAR at 04:12

## 2021-12-11 RX ADMIN — ACETAMINOPHEN 650 MG: 325 TABLET, FILM COATED ORAL at 04:12

## 2021-12-11 RX ADMIN — OXYCODONE 5 MG: 5 TABLET ORAL at 04:12

## 2021-12-11 RX ADMIN — OXYCODONE 5 MG: 5 TABLET ORAL at 08:12

## 2021-12-11 RX ADMIN — IBUPROFEN 800 MG: 400 TABLET, FILM COATED ORAL at 11:12

## 2021-12-11 RX ADMIN — OXYCODONE 5 MG: 5 TABLET ORAL at 10:12

## 2021-12-11 RX ADMIN — DOCUSATE SODIUM 200 MG: 100 CAPSULE ORAL at 10:12

## 2021-12-11 RX ADMIN — DOCUSATE SODIUM 200 MG: 100 CAPSULE ORAL at 08:12

## 2021-12-11 RX ADMIN — KETOROLAC TROMETHAMINE 30 MG: 30 INJECTION, SOLUTION INTRAMUSCULAR at 04:12

## 2021-12-11 RX ADMIN — IBUPROFEN 800 MG: 400 TABLET, FILM COATED ORAL at 04:12

## 2021-12-11 RX ADMIN — PRENATAL VIT W/ FE FUMARATE-FA TAB 27-0.8 MG 1 TABLET: 27-0.8 TAB at 10:12

## 2021-12-11 RX ADMIN — IBUPROFEN 800 MG: 400 TABLET, FILM COATED ORAL at 10:12

## 2021-12-11 RX ADMIN — ACETAMINOPHEN 650 MG: 325 TABLET, FILM COATED ORAL at 10:12

## 2021-12-12 VITALS
BODY MASS INDEX: 38.98 KG/M2 | HEIGHT: 63 IN | OXYGEN SATURATION: 96 % | WEIGHT: 220 LBS | SYSTOLIC BLOOD PRESSURE: 125 MMHG | TEMPERATURE: 98 F | RESPIRATION RATE: 18 BRPM | DIASTOLIC BLOOD PRESSURE: 67 MMHG | HEART RATE: 82 BPM

## 2021-12-12 PROCEDURE — 99024 POSTOP FOLLOW-UP VISIT: CPT | Mod: ,,, | Performed by: OBSTETRICS & GYNECOLOGY

## 2021-12-12 PROCEDURE — 25000003 PHARM REV CODE 250: Performed by: STUDENT IN AN ORGANIZED HEALTH CARE EDUCATION/TRAINING PROGRAM

## 2021-12-12 PROCEDURE — 99024 PR POST-OP FOLLOW-UP VISIT: ICD-10-PCS | Mod: ,,, | Performed by: OBSTETRICS & GYNECOLOGY

## 2021-12-12 RX ORDER — IBUPROFEN 600 MG/1
600 TABLET ORAL EVERY 6 HOURS PRN
Qty: 60 TABLET | Refills: 0 | Status: SHIPPED | OUTPATIENT
Start: 2021-12-12 | End: 2023-03-23

## 2021-12-12 RX ORDER — HYDROCODONE BITARTRATE AND ACETAMINOPHEN 5; 325 MG/1; MG/1
1 TABLET ORAL EVERY 6 HOURS PRN
Qty: 20 TABLET | Refills: 0 | Status: SHIPPED | OUTPATIENT
Start: 2021-12-12 | End: 2023-03-23

## 2021-12-12 RX ADMIN — PRENATAL VIT W/ FE FUMARATE-FA TAB 27-0.8 MG 1 TABLET: 27-0.8 TAB at 08:12

## 2021-12-12 RX ADMIN — IBUPROFEN 800 MG: 400 TABLET, FILM COATED ORAL at 08:12

## 2021-12-12 RX ADMIN — DOCUSATE SODIUM 200 MG: 100 CAPSULE ORAL at 08:12

## 2021-12-12 RX ADMIN — ACETAMINOPHEN 650 MG: 325 TABLET, FILM COATED ORAL at 05:12

## 2022-01-11 ENCOUNTER — POSTPARTUM VISIT (OUTPATIENT)
Dept: OBSTETRICS AND GYNECOLOGY | Facility: CLINIC | Age: 37
End: 2022-01-11
Payer: COMMERCIAL

## 2022-01-11 ENCOUNTER — PATIENT MESSAGE (OUTPATIENT)
Dept: OBSTETRICS AND GYNECOLOGY | Facility: CLINIC | Age: 37
End: 2022-01-11

## 2022-01-11 VITALS
WEIGHT: 209.19 LBS | DIASTOLIC BLOOD PRESSURE: 74 MMHG | HEIGHT: 63 IN | BODY MASS INDEX: 37.07 KG/M2 | SYSTOLIC BLOOD PRESSURE: 126 MMHG

## 2022-01-11 PROBLEM — O09.521 ADVANCED MATERNAL AGE IN MULTIGRAVIDA, FIRST TRIMESTER: Status: RESOLVED | Noted: 2021-05-07 | Resolved: 2022-01-11

## 2022-01-11 PROCEDURE — 99999 PR PBB SHADOW E&M-EST. PATIENT-LVL II: CPT | Mod: PBBFAC,,, | Performed by: OBSTETRICS & GYNECOLOGY

## 2022-01-11 PROCEDURE — 0503F POSTPARTUM CARE VISIT: CPT | Mod: CPTII,S$GLB,, | Performed by: OBSTETRICS & GYNECOLOGY

## 2022-01-11 PROCEDURE — 99999 PR PBB SHADOW E&M-EST. PATIENT-LVL II: ICD-10-PCS | Mod: PBBFAC,,, | Performed by: OBSTETRICS & GYNECOLOGY

## 2022-01-11 PROCEDURE — 0503F PR POSTPARTUM CARE VISIT: ICD-10-PCS | Mod: CPTII,S$GLB,, | Performed by: OBSTETRICS & GYNECOLOGY

## 2022-01-11 NOTE — PROGRESS NOTES
Subjective:       Patient ID: Charis Wells is a 36 y.o. female.    Chief Complaint:  Postpartum Care      History of Present Illness       Charis Wells is a 36 y.o. female  presents for a postpartum visit.  She is status post rLTCS 6 weeks ago.  Her hospitalization was not complicated.  She is breastfeeding.  She desires bilateral tubal ligation for contraception.  She denies postpartum depression.    Her last pap was 2021    Review of Systems  Review of Systems   Constitutional: Negative for appetite change.   Eyes: Negative for visual disturbance.   Respiratory: Negative for cough and shortness of breath.    Cardiovascular: Negative for chest pain.   Gastrointestinal: Negative for abdominal pain, constipation and diarrhea.   Genitourinary: Negative for difficulty urinating, dysuria, frequency, genital sores, pelvic pain, vaginal bleeding, vaginal discharge and vaginal pain.   Neurological: Negative for dizziness, light-headedness and headaches.   Psychiatric/Behavioral: Negative for dysphoric mood.           Objective:   Physical Exam  Vitals and nursing note reviewed.   Constitutional:       Appearance: She is well-developed and well-nourished.   Pulmonary:      Effort: Pulmonary effort is normal.   Abdominal:       Skin:     Coloration: Skin is not pale.   Neurological:      Mental Status: She is oriented to person, place, and time.   Psychiatric:         Mood and Affect: Mood and affect normal.         Behavior: Behavior normal.         Thought Content: Thought content normal.         Judgment: Judgment normal.           Assessment:        1. Postpartum state               Plan:        No orders of the defined types were placed in this encounter.         Breastfeeding: yes  Postpartum Depression: none  Contraception: vasectomy  Pap smear: 2021    Follow up for annual exam    Tiny Shelby MD  [facility]  [phone]

## 2022-08-23 ENCOUNTER — PATIENT MESSAGE (OUTPATIENT)
Dept: OBSTETRICS AND GYNECOLOGY | Facility: CLINIC | Age: 37
End: 2022-08-23
Payer: COMMERCIAL

## 2022-09-15 ENCOUNTER — OFFICE VISIT (OUTPATIENT)
Dept: URGENT CARE | Facility: CLINIC | Age: 37
End: 2022-09-15
Payer: COMMERCIAL

## 2022-09-15 VITALS
DIASTOLIC BLOOD PRESSURE: 79 MMHG | SYSTOLIC BLOOD PRESSURE: 114 MMHG | HEIGHT: 62 IN | HEART RATE: 79 BPM | RESPIRATION RATE: 17 BRPM | BODY MASS INDEX: 31.28 KG/M2 | TEMPERATURE: 98 F | WEIGHT: 170 LBS | OXYGEN SATURATION: 98 %

## 2022-09-15 DIAGNOSIS — J02.0 STREP PHARYNGITIS: ICD-10-CM

## 2022-09-15 DIAGNOSIS — J02.9 SORE THROAT: Primary | ICD-10-CM

## 2022-09-15 LAB
CTP QC/QA: YES
CTP QC/QA: YES
MOLECULAR STREP A: POSITIVE
SARS-COV-2 RDRP RESP QL NAA+PROBE: NEGATIVE

## 2022-09-15 PROCEDURE — 1159F PR MEDICATION LIST DOCUMENTED IN MEDICAL RECORD: ICD-10-PCS | Mod: CPTII,S$GLB,, | Performed by: FAMILY MEDICINE

## 2022-09-15 PROCEDURE — 87651 POCT STREP A MOLECULAR: ICD-10-PCS | Mod: QW,S$GLB,, | Performed by: FAMILY MEDICINE

## 2022-09-15 PROCEDURE — 99213 OFFICE O/P EST LOW 20 MIN: CPT | Mod: S$GLB,,, | Performed by: FAMILY MEDICINE

## 2022-09-15 PROCEDURE — 1160F RVW MEDS BY RX/DR IN RCRD: CPT | Mod: CPTII,S$GLB,, | Performed by: FAMILY MEDICINE

## 2022-09-15 PROCEDURE — 1160F PR REVIEW ALL MEDS BY PRESCRIBER/CLIN PHARMACIST DOCUMENTED: ICD-10-PCS | Mod: CPTII,S$GLB,, | Performed by: FAMILY MEDICINE

## 2022-09-15 PROCEDURE — 3008F BODY MASS INDEX DOCD: CPT | Mod: CPTII,S$GLB,, | Performed by: FAMILY MEDICINE

## 2022-09-15 PROCEDURE — 87651 STREP A DNA AMP PROBE: CPT | Mod: QW,S$GLB,, | Performed by: FAMILY MEDICINE

## 2022-09-15 PROCEDURE — 3074F SYST BP LT 130 MM HG: CPT | Mod: CPTII,S$GLB,, | Performed by: FAMILY MEDICINE

## 2022-09-15 PROCEDURE — 3074F PR MOST RECENT SYSTOLIC BLOOD PRESSURE < 130 MM HG: ICD-10-PCS | Mod: CPTII,S$GLB,, | Performed by: FAMILY MEDICINE

## 2022-09-15 PROCEDURE — 3078F DIAST BP <80 MM HG: CPT | Mod: CPTII,S$GLB,, | Performed by: FAMILY MEDICINE

## 2022-09-15 PROCEDURE — 99213 PR OFFICE/OUTPT VISIT, EST, LEVL III, 20-29 MIN: ICD-10-PCS | Mod: S$GLB,,, | Performed by: FAMILY MEDICINE

## 2022-09-15 PROCEDURE — 3078F PR MOST RECENT DIASTOLIC BLOOD PRESSURE < 80 MM HG: ICD-10-PCS | Mod: CPTII,S$GLB,, | Performed by: FAMILY MEDICINE

## 2022-09-15 PROCEDURE — U0002: ICD-10-PCS | Mod: QW,S$GLB,, | Performed by: FAMILY MEDICINE

## 2022-09-15 PROCEDURE — 3008F PR BODY MASS INDEX (BMI) DOCUMENTED: ICD-10-PCS | Mod: CPTII,S$GLB,, | Performed by: FAMILY MEDICINE

## 2022-09-15 PROCEDURE — U0002 COVID-19 LAB TEST NON-CDC: HCPCS | Mod: QW,S$GLB,, | Performed by: FAMILY MEDICINE

## 2022-09-15 PROCEDURE — 1159F MED LIST DOCD IN RCRD: CPT | Mod: CPTII,S$GLB,, | Performed by: FAMILY MEDICINE

## 2022-09-15 RX ORDER — AMOXICILLIN 875 MG/1
875 TABLET, FILM COATED ORAL 2 TIMES DAILY
Qty: 20 TABLET | Refills: 0 | Status: SHIPPED | OUTPATIENT
Start: 2022-09-15 | End: 2022-09-25

## 2022-09-15 NOTE — PROGRESS NOTES
"Subjective:       Patient ID: Charis Wells is a 36 y.o. female.    Vitals:  height is 5' 2" (1.575 m) and weight is 77.1 kg (170 lb). Her temperature is 97.9 °F (36.6 °C). Her blood pressure is 114/79 and her pulse is 79. Her respiration is 17 and oxygen saturation is 98%.     Chief Complaint: Sore Throat    This is a 36 y.o. female who presents today with a chief complaint of Sore throat, headache, swollen glands, nausea, and neck pain. Symptoms started yesterday.  Denies fever, chest pain, shortness breast, abdominal pain, diarrhea, nausea/vomiting.      Sore Throat   This is a new problem. The current episode started yesterday. The problem has been gradually worsening. Neither side of throat is experiencing more pain than the other. There has been no fever. The pain is at a severity of 4/10. The pain is moderate. Associated symptoms include congestion, headaches and swollen glands. Pertinent negatives include no neck pain. Associated symptoms comments: nausea. She has had exposure to strep. She has tried nothing for the symptoms. The treatment provided no relief.     HENT:  Positive for congestion and sore throat.    Neck: Negative for neck pain.   Neurological:  Positive for headaches.     Objective:      Physical Exam   Constitutional: She is oriented to person, place, and time. She appears well-developed. She is cooperative.  Non-toxic appearance. She does not appear ill. No distress.   HENT:   Head: Normocephalic and atraumatic.   Ears:   Right Ear: Hearing, tympanic membrane, external ear and ear canal normal. impacted cerumen  Left Ear: Hearing, tympanic membrane, external ear and ear canal normal. impacted cerumen  Nose: Congestion present. No mucosal edema, rhinorrhea or nasal deformity. No epistaxis. Right sinus exhibits no maxillary sinus tenderness and no frontal sinus tenderness. Left sinus exhibits no maxillary sinus tenderness and no frontal sinus tenderness.   Mouth/Throat: Uvula is " midline, oropharynx is clear and moist and mucous membranes are normal. No trismus in the jaw. Normal dentition. No uvula swelling. No oropharyngeal exudate or posterior oropharyngeal edema.      Comments: +ve pharyngeal erythema w/o tonsillar swelling or exudates.        Eyes: Conjunctivae and lids are normal. No scleral icterus.   Neck: Trachea normal and phonation normal. Neck supple. No edema present. No erythema present. No neck rigidity present.   Cardiovascular: Normal rate, regular rhythm, normal heart sounds and normal pulses.   No murmur heard.  Pulmonary/Chest: Effort normal and breath sounds normal. No stridor. No respiratory distress. She has no decreased breath sounds. She has no wheezes. She has no rhonchi. She has no rales.   Abdominal: Normal appearance.   Musculoskeletal: Normal range of motion.         General: No deformity. Normal range of motion.      Cervical back: She exhibits tenderness.   Lymphadenopathy:     She has cervical adenopathy.   Neurological: She is alert and oriented to person, place, and time. She exhibits normal muscle tone. Coordination normal.   Skin: Skin is warm, dry, intact, not diaphoretic and not pale.   Psychiatric: Her speech is normal and behavior is normal. Judgment and thought content normal.   Nursing note and vitals reviewed.      Assessment:       1. Sore throat    2. Strep pharyngitis          Plan:         Sore throat  -     POCT COVID-19 Rapid Screening  -     POCT Strep A, Molecular    Strep pharyngitis    Other orders  -     amoxicillin (AMOXIL) 875 MG tablet; Take 1 tablet (875 mg total) by mouth 2 (two) times daily. for 10 days  Dispense: 20 tablet; Refill: 0

## 2023-01-12 ENCOUNTER — OFFICE VISIT (OUTPATIENT)
Dept: INTERNAL MEDICINE | Facility: CLINIC | Age: 38
End: 2023-01-12
Payer: COMMERCIAL

## 2023-01-12 VITALS
SYSTOLIC BLOOD PRESSURE: 110 MMHG | DIASTOLIC BLOOD PRESSURE: 70 MMHG | WEIGHT: 193.81 LBS | HEART RATE: 87 BPM | TEMPERATURE: 98 F | BODY MASS INDEX: 35.66 KG/M2 | HEIGHT: 62 IN | OXYGEN SATURATION: 97 %

## 2023-01-12 DIAGNOSIS — Z11.59 ENCOUNTER FOR HEPATITIS C SCREENING TEST FOR LOW RISK PATIENT: ICD-10-CM

## 2023-01-12 DIAGNOSIS — Z13.220 ENCOUNTER FOR LIPID SCREENING FOR CARDIOVASCULAR DISEASE: ICD-10-CM

## 2023-01-12 DIAGNOSIS — N30.00 ACUTE CYSTITIS WITHOUT HEMATURIA: ICD-10-CM

## 2023-01-12 DIAGNOSIS — G47.00 INSOMNIA, UNSPECIFIED TYPE: ICD-10-CM

## 2023-01-12 DIAGNOSIS — Z13.1 SCREENING FOR DIABETES MELLITUS: ICD-10-CM

## 2023-01-12 DIAGNOSIS — Z13.6 ENCOUNTER FOR LIPID SCREENING FOR CARDIOVASCULAR DISEASE: ICD-10-CM

## 2023-01-12 DIAGNOSIS — Z00.00 ENCOUNTER FOR MEDICAL EXAMINATION TO ESTABLISH CARE: Primary | ICD-10-CM

## 2023-01-12 LAB
BILIRUB SERPL-MCNC: NORMAL MG/DL
BLOOD URINE, POC: NORMAL
CLARITY, POC UA: CLEAR
COLOR, POC UA: YELLOW
GLUCOSE UR QL STRIP: NORMAL
KETONES UR QL STRIP: NORMAL
LEUKOCYTE ESTERASE URINE, POC: NORMAL
NITRITE, POC UA: NORMAL
PH, POC UA: 5
PROTEIN, POC: NORMAL
SPECIFIC GRAVITY, POC UA: 1.01
UROBILINOGEN, POC UA: NORMAL

## 2023-01-12 PROCEDURE — 3008F BODY MASS INDEX DOCD: CPT | Mod: CPTII,S$GLB,, | Performed by: NURSE PRACTITIONER

## 2023-01-12 PROCEDURE — 3044F PR MOST RECENT HEMOGLOBIN A1C LEVEL <7.0%: ICD-10-PCS | Mod: CPTII,S$GLB,, | Performed by: NURSE PRACTITIONER

## 2023-01-12 PROCEDURE — 99999 PR PBB SHADOW E&M-EST. PATIENT-LVL III: ICD-10-PCS | Mod: PBBFAC,,, | Performed by: NURSE PRACTITIONER

## 2023-01-12 PROCEDURE — 1160F PR REVIEW ALL MEDS BY PRESCRIBER/CLIN PHARMACIST DOCUMENTED: ICD-10-PCS | Mod: CPTII,S$GLB,, | Performed by: NURSE PRACTITIONER

## 2023-01-12 PROCEDURE — 1160F RVW MEDS BY RX/DR IN RCRD: CPT | Mod: CPTII,S$GLB,, | Performed by: NURSE PRACTITIONER

## 2023-01-12 PROCEDURE — 3078F DIAST BP <80 MM HG: CPT | Mod: CPTII,S$GLB,, | Performed by: NURSE PRACTITIONER

## 2023-01-12 PROCEDURE — 99385 PREV VISIT NEW AGE 18-39: CPT | Mod: S$GLB,,, | Performed by: NURSE PRACTITIONER

## 2023-01-12 PROCEDURE — 81002 URINALYSIS NONAUTO W/O SCOPE: CPT | Mod: S$GLB,,, | Performed by: NURSE PRACTITIONER

## 2023-01-12 PROCEDURE — 99385 PR PREVENTIVE VISIT,NEW,18-39: ICD-10-PCS | Mod: S$GLB,,, | Performed by: NURSE PRACTITIONER

## 2023-01-12 PROCEDURE — 3008F PR BODY MASS INDEX (BMI) DOCUMENTED: ICD-10-PCS | Mod: CPTII,S$GLB,, | Performed by: NURSE PRACTITIONER

## 2023-01-12 PROCEDURE — 3044F HG A1C LEVEL LT 7.0%: CPT | Mod: CPTII,S$GLB,, | Performed by: NURSE PRACTITIONER

## 2023-01-12 PROCEDURE — 1159F PR MEDICATION LIST DOCUMENTED IN MEDICAL RECORD: ICD-10-PCS | Mod: CPTII,S$GLB,, | Performed by: NURSE PRACTITIONER

## 2023-01-12 PROCEDURE — 1159F MED LIST DOCD IN RCRD: CPT | Mod: CPTII,S$GLB,, | Performed by: NURSE PRACTITIONER

## 2023-01-12 PROCEDURE — 3074F PR MOST RECENT SYSTOLIC BLOOD PRESSURE < 130 MM HG: ICD-10-PCS | Mod: CPTII,S$GLB,, | Performed by: NURSE PRACTITIONER

## 2023-01-12 PROCEDURE — 3078F PR MOST RECENT DIASTOLIC BLOOD PRESSURE < 80 MM HG: ICD-10-PCS | Mod: CPTII,S$GLB,, | Performed by: NURSE PRACTITIONER

## 2023-01-12 PROCEDURE — 99999 PR PBB SHADOW E&M-EST. PATIENT-LVL III: CPT | Mod: PBBFAC,,, | Performed by: NURSE PRACTITIONER

## 2023-01-12 PROCEDURE — 3074F SYST BP LT 130 MM HG: CPT | Mod: CPTII,S$GLB,, | Performed by: NURSE PRACTITIONER

## 2023-01-12 PROCEDURE — 81002 POCT URINE DIPSTICK WITHOUT MICROSCOPE: ICD-10-PCS | Mod: S$GLB,,, | Performed by: NURSE PRACTITIONER

## 2023-01-12 NOTE — PROGRESS NOTES
[facility] Note    Chief Complaint      Chief Complaint   Patient presents with    Annual Exam     History of Present Illness      Charis Wells is a 37 y.o. female patient with no chronic conditions known who is new to me and  presents today for annual visit exam. Pt feeling well, no complaints, denies sob or cp, reviewed meds and history with pt. No concerns of bowel or bladder function, regular diet, stays hydrated and active.     Comes to appt alone  Practices safety measures such as wearing her seatbelt      Labs- ordered  Eye exams- utd  Gyn-Dr. Shelby      Vacciens:  Covid- declines  Tdap-  Flu shot- declines    Health Maintenance   Topic Date Due    TETANUS VACCINE  2031    Hepatitis C Screening  Completed    Lipid Panel  Completed       Past Medical History:   Diagnosis Date    Allergy     Chronic kidney disease     born with kidney disease been under control since age 12    Keloid cicatrix     Lipoma of unspecified site 2012       Past Surgical History:   Procedure Laterality Date     SECTION       SECTION N/A 2019    Procedure:  SECTION;  Surgeon: Lizzie Mackenzie MD;  Location: [facility];  Service: OB/GYN;  Laterality: N/A;     SECTION N/A 12/10/2021    Procedure:  SECTION;  Surgeon: Tiny Shelby MD;  Location: [facility];  Service: OB/GYN;  Laterality: N/A;    COLONOSCOPY N/A 2016    Procedure: COLONOSCOPY - Miralax split prep;  Surgeon: Jose Antonio Jr., MD;  Location: [facility];  Service: Endoscopy;  Laterality: N/A;    LIPOMA RESECTION      right buttocks    TENDON REPAIR      bilateral feet    TONSILLECTOMY         family history includes Breast cancer (age of onset: 35) in her mother; Cancer in her maternal grandmother and mother; Diabetes in her paternal grandfather.    Social History     Tobacco Use    Smoking status: Never    Smokeless tobacco: Never   Substance Use Topics    Alcohol use: No  "    Comment: occasional    Drug use: No       Review of Systems   Constitutional:  Negative for chills and fever.   HENT:  Negative for congestion, sinus pain and sore throat.    Eyes:  Negative for blurred vision.   Respiratory:  Negative for cough, shortness of breath and wheezing.    Cardiovascular:  Negative for chest pain, palpitations and leg swelling.   Gastrointestinal:  Negative for abdominal pain, constipation, diarrhea, nausea and vomiting.   Genitourinary:  Negative for dysuria.   Musculoskeletal:  Negative for myalgias.   Skin:  Negative for rash.   Neurological:  Negative for dizziness, weakness and headaches.   Psychiatric/Behavioral:  Negative for depression. The patient is not nervous/anxious.       Outpatient Encounter Medications as of 1/12/2023   Medication Sig Dispense Refill    HYDROcodone-acetaminophen (NORCO) 5-325 mg per tablet Take 1 tablet by mouth every 6 (six) hours as needed for Pain. (Patient not taking: Reported on 1/11/2022) 20 tablet 0    ibuprofen (ADVIL,MOTRIN) 600 MG tablet Take 1 tablet (600 mg total) by mouth every 6 (six) hours as needed for Pain. (Patient not taking: Reported on 1/11/2022) 60 tablet 0    PNV no.95/ferrous fum/folic ac (PRENATAL ORAL) Take by mouth.       No facility-administered encounter medications on file as of 1/12/2023.        Review of patient's allergies indicates:   Allergen Reactions    No known drug allergies        Physical Exam      Vital Signs  Temp: 98 °F (36.7 °C)  Temp src: Oral  Pulse: 87  SpO2: 97 %  BP: 110/70  BP Location: Left arm  Patient Position: Sitting  Height and Weight  Height: 5' 2" (157.5 cm)  Weight: 87.9 kg (193 lb 12.6 oz)  BSA (Calculated - sq m): 1.96 sq meters  BMI (Calculated): 35.4  Weight in (lb) to have BMI = 25: 136.4    Physical Exam  Vitals and nursing note reviewed.   Constitutional:       General: She is not in acute distress.     Appearance: Normal appearance. She is well-developed. She is not ill-appearing. "   HENT:      Head: Normocephalic and atraumatic.      Right Ear: External ear normal.      Left Ear: External ear normal.   Eyes:      Conjunctiva/sclera: Conjunctivae normal.      Pupils: Pupils are equal, round, and reactive to light.   Neck:      Thyroid: No thyromegaly.      Vascular: No JVD.      Trachea: No tracheal deviation.   Cardiovascular:      Rate and Rhythm: Normal rate and regular rhythm.      Heart sounds: Normal heart sounds. No murmur heard.  Pulmonary:      Effort: Pulmonary effort is normal.      Breath sounds: Normal breath sounds.   Chest:      Chest wall: No tenderness.   Abdominal:      General: Bowel sounds are normal.      Palpations: Abdomen is soft.      Tenderness: There is no abdominal tenderness. There is no guarding.   Musculoskeletal:         General: Normal range of motion.      Cervical back: Normal range of motion and neck supple.   Lymphadenopathy:      Cervical: No cervical adenopathy.   Skin:     General: Skin is warm and dry.   Neurological:      General: No focal deficit present.      Mental Status: She is alert and oriented to person, place, and time.   Psychiatric:         Mood and Affect: Mood normal.         Behavior: Behavior normal.         Thought Content: Thought content normal.         Judgment: Judgment normal.        Laboratory:  CBC:  Lab Results   Component Value Date    WBC 7.81 01/19/2023    RBC 5.17 01/19/2023    HGB 14.3 01/19/2023    HCT 45.1 01/19/2023     01/19/2023    MCV 87 01/19/2023    MCH 27.7 01/19/2023    MCHC 31.7 (L) 01/19/2023    MCHC 33.8 12/11/2021    MCHC 33.4 12/10/2021     CMP:  Lab Results   Component Value Date    GLU 82 01/19/2023    CALCIUM 9.4 01/19/2023    ALBUMIN 4.3 01/19/2023    PROT 7.4 01/19/2023     01/19/2023    K 4.5 01/19/2023    CO2 22 (L) 01/19/2023     01/19/2023    BUN 16 01/19/2023    ALKPHOS 57 01/19/2023    ALT 23 01/19/2023    AST 17 01/19/2023    BILITOT 0.7 01/19/2023    BILITOT 0.8 01/04/2018     BILITOT 0.3 12/28/2017     URINALYSIS:  Lab Results   Component Value Date    COLORU Yellow 01/12/2023    CLARITYU Clear 01/12/2023    SPECGRAV 1.015 01/12/2023    PHUR 5 01/12/2023    PROTEINUA Negative 12/28/2017    NITRITE - 01/12/2023    LEUKOCYTESUR Negative 12/28/2017    UROBILINOGEN - 01/12/2023      LIPIDS:  Lab Results   Component Value Date    TSH 0.785 01/19/2023    TSH 0.864 06/04/2018    TSH 1.353 07/21/2015    HDL 51 01/19/2023    HDL 54 08/01/2014    CHOL 183 01/19/2023    CHOL 165 08/01/2014    TRIG 65 01/19/2023    TRIG 48 08/01/2014    LDLCALC 119.0 01/19/2023    LDLCALC 101.4 08/01/2014    CHOLHDL 27.9 01/19/2023    CHOLHDL 32.7 08/01/2014    NONHDLCHOL 132 01/19/2023    NONHDLCHOL 111 08/01/2014    TOTALCHOLEST 3.6 01/19/2023    TOTALCHOLEST 3.1 08/01/2014     TSH:  Lab Results   Component Value Date    TSH 0.785 01/19/2023    TSH 0.864 06/04/2018    TSH 1.353 07/21/2015     A1C:  Lab Results   Component Value Date    HGBA1C 5.3 01/19/2023         Assessment/Plan     Charis Wells is a 37 y.o.female with:    Encounter for medical examination to establish care  -     CBC Auto Differential; Future; Expected date: 01/12/2023  -     Comprehensive Metabolic Panel; Future; Expected date: 01/12/2023  -     Hemoglobin A1C; Future; Expected date: 01/12/2023  -     Hepatitis C Antibody; Future; Expected date: 01/12/2023  -     Lipid Panel; Future; Expected date: 01/12/2023  -     T4, Free; Future; Expected date: 01/12/2023  -     TSH; Future; Expected date: 01/12/2023    Insomnia, unspecified type    Acute cystitis without hematuria  -     POCT URINE DIPSTICK WITHOUT MICROSCOPE    Screening for diabetes mellitus  -     CBC Auto Differential; Future; Expected date: 01/12/2023  -     Comprehensive Metabolic Panel; Future; Expected date: 01/12/2023  -     Hemoglobin A1C; Future; Expected date: 01/12/2023  -     Hepatitis C Antibody; Future; Expected date: 01/12/2023  -     Lipid Panel; Future; Expected  date: 01/12/2023  -     T4, Free; Future; Expected date: 01/12/2023  -     TSH; Future; Expected date: 01/12/2023    Encounter for hepatitis C screening test for low risk patient  -     CBC Auto Differential; Future; Expected date: 01/12/2023  -     Comprehensive Metabolic Panel; Future; Expected date: 01/12/2023  -     Hemoglobin A1C; Future; Expected date: 01/12/2023  -     Hepatitis C Antibody; Future; Expected date: 01/12/2023  -     Lipid Panel; Future; Expected date: 01/12/2023  -     T4, Free; Future; Expected date: 01/12/2023  -     TSH; Future; Expected date: 01/12/2023    Encounter for lipid screening for cardiovascular disease  -     CBC Auto Differential; Future; Expected date: 01/12/2023  -     Comprehensive Metabolic Panel; Future; Expected date: 01/12/2023  -     Hemoglobin A1C; Future; Expected date: 01/12/2023  -     Hepatitis C Antibody; Future; Expected date: 01/12/2023  -     Lipid Panel; Future; Expected date: 01/12/2023  -     T4, Free; Future; Expected date: 01/12/2023  -     TSH; Future; Expected date: 01/12/2023          Health Maintenance Due   Topic Date Due    COVID-19 Vaccine (1) Never done    Influenza Vaccine (1) 09/01/2022        I spent 40 minutes on the day of this encounter for preparing for, evaluating, treating, and managing this patient.      -Continue current medications and maintain follow up with specialists.  Return to clinic in 1 year or sooner for any concerns   No follow-ups on file.       MART JettC  [facility]

## 2023-01-19 ENCOUNTER — LAB VISIT (OUTPATIENT)
Dept: LAB | Facility: HOSPITAL | Age: 38
End: 2023-01-19
Attending: NURSE PRACTITIONER
Payer: COMMERCIAL

## 2023-01-19 DIAGNOSIS — Z13.6 ENCOUNTER FOR LIPID SCREENING FOR CARDIOVASCULAR DISEASE: ICD-10-CM

## 2023-01-19 DIAGNOSIS — Z13.220 ENCOUNTER FOR LIPID SCREENING FOR CARDIOVASCULAR DISEASE: ICD-10-CM

## 2023-01-19 DIAGNOSIS — Z13.1 SCREENING FOR DIABETES MELLITUS: ICD-10-CM

## 2023-01-19 DIAGNOSIS — Z00.00 ENCOUNTER FOR MEDICAL EXAMINATION TO ESTABLISH CARE: ICD-10-CM

## 2023-01-19 DIAGNOSIS — Z11.59 ENCOUNTER FOR HEPATITIS C SCREENING TEST FOR LOW RISK PATIENT: ICD-10-CM

## 2023-01-19 LAB
ALBUMIN SERPL BCP-MCNC: 4.3 G/DL (ref 3.5–5.2)
ALP SERPL-CCNC: 57 U/L (ref 55–135)
ALT SERPL W/O P-5'-P-CCNC: 23 U/L (ref 10–44)
ANION GAP SERPL CALC-SCNC: 9 MMOL/L (ref 8–16)
AST SERPL-CCNC: 17 U/L (ref 10–40)
BASOPHILS # BLD AUTO: 0.05 K/UL (ref 0–0.2)
BASOPHILS NFR BLD: 0.6 % (ref 0–1.9)
BILIRUB SERPL-MCNC: 0.7 MG/DL (ref 0.1–1)
BUN SERPL-MCNC: 16 MG/DL (ref 6–20)
CALCIUM SERPL-MCNC: 9.4 MG/DL (ref 8.7–10.5)
CHLORIDE SERPL-SCNC: 106 MMOL/L (ref 95–110)
CHOLEST SERPL-MCNC: 183 MG/DL (ref 120–199)
CHOLEST/HDLC SERPL: 3.6 {RATIO} (ref 2–5)
CO2 SERPL-SCNC: 22 MMOL/L (ref 23–29)
CREAT SERPL-MCNC: 0.8 MG/DL (ref 0.5–1.4)
DIFFERENTIAL METHOD: ABNORMAL
EOSINOPHIL # BLD AUTO: 0.1 K/UL (ref 0–0.5)
EOSINOPHIL NFR BLD: 1.2 % (ref 0–8)
ERYTHROCYTE [DISTWIDTH] IN BLOOD BY AUTOMATED COUNT: 13.1 % (ref 11.5–14.5)
EST. GFR  (NO RACE VARIABLE): >60 ML/MIN/1.73 M^2
ESTIMATED AVG GLUCOSE: 105 MG/DL (ref 68–131)
GLUCOSE SERPL-MCNC: 82 MG/DL (ref 70–110)
HBA1C MFR BLD: 5.3 % (ref 4–5.6)
HCT VFR BLD AUTO: 45.1 % (ref 37–48.5)
HCV AB SERPL QL IA: NORMAL
HDLC SERPL-MCNC: 51 MG/DL (ref 40–75)
HDLC SERPL: 27.9 % (ref 20–50)
HGB BLD-MCNC: 14.3 G/DL (ref 12–16)
IMM GRANULOCYTES # BLD AUTO: 0.02 K/UL (ref 0–0.04)
IMM GRANULOCYTES NFR BLD AUTO: 0.3 % (ref 0–0.5)
LDLC SERPL CALC-MCNC: 119 MG/DL (ref 63–159)
LYMPHOCYTES # BLD AUTO: 2 K/UL (ref 1–4.8)
LYMPHOCYTES NFR BLD: 25.2 % (ref 18–48)
MCH RBC QN AUTO: 27.7 PG (ref 27–31)
MCHC RBC AUTO-ENTMCNC: 31.7 G/DL (ref 32–36)
MCV RBC AUTO: 87 FL (ref 82–98)
MONOCYTES # BLD AUTO: 0.5 K/UL (ref 0.3–1)
MONOCYTES NFR BLD: 6.1 % (ref 4–15)
NEUTROPHILS # BLD AUTO: 5.2 K/UL (ref 1.8–7.7)
NEUTROPHILS NFR BLD: 66.6 % (ref 38–73)
NONHDLC SERPL-MCNC: 132 MG/DL
NRBC BLD-RTO: 0 /100 WBC
PLATELET # BLD AUTO: 297 K/UL (ref 150–450)
PMV BLD AUTO: 10.1 FL (ref 9.2–12.9)
POTASSIUM SERPL-SCNC: 4.5 MMOL/L (ref 3.5–5.1)
PROT SERPL-MCNC: 7.4 G/DL (ref 6–8.4)
RBC # BLD AUTO: 5.17 M/UL (ref 4–5.4)
SODIUM SERPL-SCNC: 137 MMOL/L (ref 136–145)
T4 FREE SERPL-MCNC: 0.87 NG/DL (ref 0.71–1.51)
TRIGL SERPL-MCNC: 65 MG/DL (ref 30–150)
TSH SERPL DL<=0.005 MIU/L-ACNC: 0.79 UIU/ML (ref 0.4–4)
WBC # BLD AUTO: 7.81 K/UL (ref 3.9–12.7)

## 2023-01-19 PROCEDURE — 80061 LIPID PANEL: CPT | Performed by: NURSE PRACTITIONER

## 2023-01-19 PROCEDURE — 84439 ASSAY OF FREE THYROXINE: CPT | Performed by: NURSE PRACTITIONER

## 2023-01-19 PROCEDURE — 83036 HEMOGLOBIN GLYCOSYLATED A1C: CPT | Performed by: NURSE PRACTITIONER

## 2023-01-19 PROCEDURE — 36415 COLL VENOUS BLD VENIPUNCTURE: CPT | Performed by: NURSE PRACTITIONER

## 2023-01-19 PROCEDURE — 85025 COMPLETE CBC W/AUTO DIFF WBC: CPT | Performed by: NURSE PRACTITIONER

## 2023-01-19 PROCEDURE — 84443 ASSAY THYROID STIM HORMONE: CPT | Performed by: NURSE PRACTITIONER

## 2023-01-19 PROCEDURE — 86803 HEPATITIS C AB TEST: CPT | Performed by: NURSE PRACTITIONER

## 2023-01-19 PROCEDURE — 80053 COMPREHEN METABOLIC PANEL: CPT | Performed by: NURSE PRACTITIONER

## 2023-01-20 ENCOUNTER — PATIENT MESSAGE (OUTPATIENT)
Dept: INTERNAL MEDICINE | Facility: CLINIC | Age: 38
End: 2023-01-20
Payer: COMMERCIAL

## 2023-03-10 ENCOUNTER — OFFICE VISIT (OUTPATIENT)
Dept: OBSTETRICS AND GYNECOLOGY | Facility: CLINIC | Age: 38
End: 2023-03-10
Payer: COMMERCIAL

## 2023-03-10 VITALS
DIASTOLIC BLOOD PRESSURE: 72 MMHG | SYSTOLIC BLOOD PRESSURE: 109 MMHG | WEIGHT: 191.56 LBS | BODY MASS INDEX: 35.25 KG/M2 | HEIGHT: 62 IN

## 2023-03-10 DIAGNOSIS — Z01.419 WELL WOMAN EXAM WITH ROUTINE GYNECOLOGICAL EXAM: Primary | ICD-10-CM

## 2023-03-10 DIAGNOSIS — Z12.31 ENCOUNTER FOR SCREENING MAMMOGRAM FOR MALIGNANT NEOPLASM OF BREAST: ICD-10-CM

## 2023-03-10 PROCEDURE — 3044F HG A1C LEVEL LT 7.0%: CPT | Mod: CPTII,S$GLB,, | Performed by: OBSTETRICS & GYNECOLOGY

## 2023-03-10 PROCEDURE — 99999 PR PBB SHADOW E&M-EST. PATIENT-LVL III: CPT | Mod: PBBFAC,,, | Performed by: OBSTETRICS & GYNECOLOGY

## 2023-03-10 PROCEDURE — 99999 PR PBB SHADOW E&M-EST. PATIENT-LVL III: ICD-10-PCS | Mod: PBBFAC,,, | Performed by: OBSTETRICS & GYNECOLOGY

## 2023-03-10 PROCEDURE — 3074F SYST BP LT 130 MM HG: CPT | Mod: CPTII,S$GLB,, | Performed by: OBSTETRICS & GYNECOLOGY

## 2023-03-10 PROCEDURE — 3078F DIAST BP <80 MM HG: CPT | Mod: CPTII,S$GLB,, | Performed by: OBSTETRICS & GYNECOLOGY

## 2023-03-10 PROCEDURE — 1160F PR REVIEW ALL MEDS BY PRESCRIBER/CLIN PHARMACIST DOCUMENTED: ICD-10-PCS | Mod: CPTII,S$GLB,, | Performed by: OBSTETRICS & GYNECOLOGY

## 2023-03-10 PROCEDURE — 1160F RVW MEDS BY RX/DR IN RCRD: CPT | Mod: CPTII,S$GLB,, | Performed by: OBSTETRICS & GYNECOLOGY

## 2023-03-10 PROCEDURE — 99395 PR PREVENTIVE VISIT,EST,18-39: ICD-10-PCS | Mod: S$GLB,,, | Performed by: OBSTETRICS & GYNECOLOGY

## 2023-03-10 PROCEDURE — 3074F PR MOST RECENT SYSTOLIC BLOOD PRESSURE < 130 MM HG: ICD-10-PCS | Mod: CPTII,S$GLB,, | Performed by: OBSTETRICS & GYNECOLOGY

## 2023-03-10 PROCEDURE — 3008F PR BODY MASS INDEX (BMI) DOCUMENTED: ICD-10-PCS | Mod: CPTII,S$GLB,, | Performed by: OBSTETRICS & GYNECOLOGY

## 2023-03-10 PROCEDURE — 3044F PR MOST RECENT HEMOGLOBIN A1C LEVEL <7.0%: ICD-10-PCS | Mod: CPTII,S$GLB,, | Performed by: OBSTETRICS & GYNECOLOGY

## 2023-03-10 PROCEDURE — 1159F PR MEDICATION LIST DOCUMENTED IN MEDICAL RECORD: ICD-10-PCS | Mod: CPTII,S$GLB,, | Performed by: OBSTETRICS & GYNECOLOGY

## 2023-03-10 PROCEDURE — 1159F MED LIST DOCD IN RCRD: CPT | Mod: CPTII,S$GLB,, | Performed by: OBSTETRICS & GYNECOLOGY

## 2023-03-10 PROCEDURE — 99395 PREV VISIT EST AGE 18-39: CPT | Mod: S$GLB,,, | Performed by: OBSTETRICS & GYNECOLOGY

## 2023-03-10 PROCEDURE — 3078F PR MOST RECENT DIASTOLIC BLOOD PRESSURE < 80 MM HG: ICD-10-PCS | Mod: CPTII,S$GLB,, | Performed by: OBSTETRICS & GYNECOLOGY

## 2023-03-10 PROCEDURE — 3008F BODY MASS INDEX DOCD: CPT | Mod: CPTII,S$GLB,, | Performed by: OBSTETRICS & GYNECOLOGY

## 2023-03-10 NOTE — PROGRESS NOTES
History & Physical  Gynecology      SUBJECTIVE:     Chief Complaint: Well Woman and Vaginal Discharge       History of Present Illness:  Annual Exam-Premenopausal  Patient presents for annual exam. She has complaints today of vaginal discharge, no itching or odor.  Sometimes it is pink in color. Menstrual cycles are mostly monthly, no more than about 40 days between.  She is sexually active. GYN screening history: last pap: approximate date  paphpv and was normal. She participates in regular exercise: no.  Smoking status:  no    Contraception: vasectomy    FH:  Breast cancer: mother  Colon cancer: neg  Ovarian cancer: neg    Review of patient's allergies indicates:   Allergen Reactions    No known drug allergies        Past Medical History:   Diagnosis Date    Allergy     Chronic kidney disease     born with kidney disease been under control since age 12    Keloid cicatrix     Lipoma of unspecified site 2012     Past Surgical History:   Procedure Laterality Date     SECTION       SECTION N/A 2019    Procedure:  SECTION;  Surgeon: Lizzie Mackenzie MD;  Location: [facility];  Service: OB/GYN;  Laterality: N/A;     SECTION N/A 12/10/2021    Procedure:  SECTION;  Surgeon: Tiny Shelby MD;  Location: [facility];  Service: OB/GYN;  Laterality: N/A;    COLONOSCOPY N/A 2016    Procedure: COLONOSCOPY - Miralax split prep;  Surgeon: Jose Antonio Jr., MD;  Location: [facility];  Service: Endoscopy;  Laterality: N/A;    LIPOMA RESECTION      right buttocks    TENDON REPAIR      bilateral feet    TONSILLECTOMY       OB History          5    Para   2    Term   2            AB   2    Living   2         SAB   1    IAB        Ectopic   1    Multiple   0    Live Births   2               Family History   Problem Relation Age of Onset    Cancer Mother         breast    Breast cancer Mother 35        , negative genetic testing    Cancer Maternal  Grandmother         throat    Diabetes Paternal Grandfather     Melanoma Neg Hx     Colon cancer Neg Hx     Ovarian cancer Neg Hx      Social History     Tobacco Use    Smoking status: Never    Smokeless tobacco: Never   Substance Use Topics    Alcohol use: No     Comment: occasional    Drug use: No       Current Outpatient Medications   Medication Sig    HYDROcodone-acetaminophen (NORCO) 5-325 mg per tablet Take 1 tablet by mouth every 6 (six) hours as needed for Pain. (Patient not taking: Reported on 1/11/2022)    ibuprofen (ADVIL,MOTRIN) 600 MG tablet Take 1 tablet (600 mg total) by mouth every 6 (six) hours as needed for Pain. (Patient not taking: Reported on 1/11/2022)    PNV no.95/ferrous fum/folic ac (PRENATAL ORAL) Take by mouth.     No current facility-administered medications for this visit.         Review of Systems:  Review of Systems   Constitutional:  Negative for appetite change, fever and unexpected weight change.   Respiratory:  Negative for shortness of breath.    Cardiovascular:  Negative for chest pain.   Gastrointestinal:  Negative for nausea and vomiting.   Genitourinary:  Positive for vaginal discharge. Negative for menorrhagia, menstrual problem, pelvic pain, vaginal bleeding, vaginal pain and vaginal odor.   Integumentary:  Negative for breast mass.   Breast: Negative for mass     OBJECTIVE:     Physical Exam:  Physical Exam  Vitals and nursing note reviewed.   Constitutional:       Appearance: She is well-developed.   Cardiovascular:      Rate and Rhythm: Normal rate and regular rhythm.      Heart sounds: Normal heart sounds.   Pulmonary:      Effort: Pulmonary effort is normal.      Breath sounds: Normal breath sounds.   Chest:   Breasts:     Breasts are symmetrical.      Right: No inverted nipple, mass, nipple discharge, skin change or tenderness.      Left: No inverted nipple, mass, nipple discharge, skin change or tenderness.   Abdominal:      Palpations: Abdomen is soft.    Genitourinary:     General: Normal vulva.      Labia:         Right: No rash, tenderness, lesion or injury.         Left: No rash, tenderness, lesion or injury.       Urethra: No prolapse, urethral pain, urethral swelling or urethral lesion.      Vagina: Normal. No signs of injury and foreign body. No vaginal discharge, erythema, tenderness or bleeding.      Cervix: No cervical motion tenderness, discharge or friability.      Uterus: Not deviated, not enlarged, not fixed and not tender.       Adnexa:         Right: No mass, tenderness or fullness.          Left: No mass, tenderness or fullness.        Rectum: No anal fissure or external hemorrhoid.      Comments: Urethral meatus: normal size, anterior vaginal wall with no prolapse, no lesions  Bladder: no fullness, masses or tenderness  Musculoskeletal:      Cervical back: Normal range of motion.   Neurological:      Mental Status: She is alert and oriented to person, place, and time.   Psychiatric:         Behavior: Behavior normal.         Thought Content: Thought content normal.         Judgment: Judgment normal.       Chaperoned by: Nelsy    ASSESSMENT:       ICD-10-CM ICD-9-CM    1. Well woman exam with routine gynecological exam  Z01.419 V72.31       2. Encounter for screening mammogram for malignant neoplasm of breast  Z12.31 V76.12 Ambulatory referral/consult to Breast Surgery             Plan:      Charis was seen today for well woman and vaginal discharge.    Diagnoses and all orders for this visit:    Well woman exam with routine gynecological exam    Encounter for screening mammogram for malignant neoplasm of breast  -     Ambulatory referral/consult to Breast Surgery; Future        Orders Placed This Encounter   Procedures    Ambulatory referral/consult to Breast Surgery       Well Woman:  - Pap smear up to date  - Birth control: vasectomy  - GC/CT:n/a  - Mammogram: due age 40, referral to breast center  - Smoking cessation: n/a  - Labs: none required    - Vaccines: none required  - Exercise counseling      Follow up in  one year for annual or prn.    Tiny Shelby

## 2023-03-22 ENCOUNTER — TELEPHONE (OUTPATIENT)
Dept: HEMATOLOGY/ONCOLOGY | Facility: CLINIC | Age: 38
End: 2023-03-22
Payer: COMMERCIAL

## 2023-03-23 ENCOUNTER — OFFICE VISIT (OUTPATIENT)
Dept: HEMATOLOGY/ONCOLOGY | Facility: CLINIC | Age: 38
End: 2023-03-23
Payer: COMMERCIAL

## 2023-03-23 DIAGNOSIS — Z87.898 HISTORY OF ABNORMAL MAMMOGRAM: ICD-10-CM

## 2023-03-23 DIAGNOSIS — Z12.31 ENCOUNTER FOR SCREENING MAMMOGRAM FOR MALIGNANT NEOPLASM OF BREAST: ICD-10-CM

## 2023-03-23 DIAGNOSIS — Z80.3 FAMILY HISTORY OF BREAST CANCER IN FIRST DEGREE RELATIVE: ICD-10-CM

## 2023-03-23 DIAGNOSIS — Z91.89 INCREASED RISK OF BREAST CANCER: Primary | ICD-10-CM

## 2023-03-23 DIAGNOSIS — R92.30 DENSE BREAST TISSUE ON MAMMOGRAM: ICD-10-CM

## 2023-03-23 PROCEDURE — 99204 PR OFFICE/OUTPT VISIT, NEW, LEVL IV, 45-59 MIN: ICD-10-PCS | Mod: 95,,, | Performed by: NURSE PRACTITIONER

## 2023-03-23 PROCEDURE — 99204 OFFICE O/P NEW MOD 45 MIN: CPT | Mod: 95,,, | Performed by: NURSE PRACTITIONER

## 2023-03-23 PROCEDURE — 3044F HG A1C LEVEL LT 7.0%: CPT | Mod: CPTII,95,, | Performed by: NURSE PRACTITIONER

## 2023-03-23 PROCEDURE — 3044F PR MOST RECENT HEMOGLOBIN A1C LEVEL <7.0%: ICD-10-PCS | Mod: CPTII,95,, | Performed by: NURSE PRACTITIONER

## 2023-03-23 NOTE — PROGRESS NOTES
"  Reason For Consultation:   Increased lifetime risk of breast cancer    Referring Provider:   Tiny Shelby MD  [address] [place] [address]    The patient location is: home/[place]  The chief complaint leading to consultation is: high risk breast    Visit type: audiovisual    Face to Face time with patient: 35  45 minutes of total time spent on the encounter, which includes face to face time and non-face to face time preparing to see the patient (eg, review of tests), Obtaining and/or reviewing separately obtained history, Documenting clinical information in the electronic or other health record, Independently interpreting results (not separately reported) and communicating results to the patient/family/caregiver, or Care coordination (not separately reported).     Each patient to whom he or she provides medical services by telemedicine is:  (1) informed of the relationship between the physician and patient and the respective role of any other health care provider with respect to management of the patient; and (2) notified that he or she may decline to receive medical services by telemedicine and may withdraw from such care at any time.    Notes:    Records Obtained: Records of the patients history including those obtained from the referring provider were reviewed and summarized in detail.    HPI:   Charis Wells presents for consultation of increased risk of breast cancer. She is premenopausal. She presented for screening mammogram on 17 which was benign but revealed a Tyrer-Cuzick score of 22.89%.     Today, Feels good and no complaints.   No breast concerns.    High Risk Breast cancer specific history:  - Age: 37 y.o.   - Height:  5'3"  - Weight: 185 lbs  - Breast density per BI-RADS:  extremely dense  - Age at menarche:  17  - Number of pregnancies: ; age of first live birth: 30   - History of breast feeding: Yes - 14 months, 20 months, 15 months (still breastfeeding third child at " [facility]).   - Age at menopause, if applicable:  n/a. Premenopausal, was told had diminised ovarian reserve and would probably go into early menopause, did ivf for baby number 2  -Uterus and ovaries intact: Yes  - HRT: No (Did IVF for second child)      - Genetic testing: No  - Personal history of cancer: No  - Previous chest radiation exposure between ages 10-30 years old: No  - Personal history of breast biopsy: No  - [employer]kenazi [employer] Inheritance: No  - Family history of cancer:  mother: breast cancer diagnosed at 36, now 66  Paternal great grandmother: breast cancer  Maternal grandmother: throat cancer    Social History:  Tobacco use:  none  Alcohol use:  occasional  Exercise regimen: not much currently  Employment: sits at a desk all day, 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     Past Medical   Past Medical History:   Diagnosis Date    Allergy     Chronic kidney disease     born with kidney disease been under control since age 12    Keloid cicatrix     Lipoma of unspecified site 2012     Patient Active Problem List   Diagnosis    Mastodynia    Insomnia    Family history of breast cancer in mother    Rh negative status during pregnancy-RHOGAM at 28wks    Indication for care or intervention in labor or delivery    Screening for colorectal cancer    Ectopic pregnancy    'light-for-dates' infant with signs of fetal malnutrition    History of  section complicating pregnancy     delivery delivered     Social History   Social History     Tobacco Use    Smoking status: Never    Smokeless tobacco: Never   Substance Use Topics    Alcohol use: No     Comment: occasional    Drug use: No     Family History  Family History   Problem Relation Age of Onset    Cancer Mother         breast    Breast cancer Mother 35        , negative genetic testing    Cancer Maternal Grandmother         throat    Diabetes Paternal Grandfather     Melanoma Neg Hx     Colon cancer Neg Hx     Ovarian  cancer Neg Hx      Medications    Current Outpatient Medications:     HYDROcodone-acetaminophen (NORCO) 5-325 mg per tablet, Take 1 tablet by mouth every 6 (six) hours as needed for Pain. (Patient not taking: Reported on 1/11/2022), Disp: 20 tablet, Rfl: 0    ibuprofen (ADVIL,MOTRIN) 600 MG tablet, Take 1 tablet (600 mg total) by mouth every 6 (six) hours as needed for Pain. (Patient not taking: Reported on 1/11/2022), Disp: 60 tablet, Rfl: 0    PNV no.95/ferrous fum/folic ac (PRENATAL ORAL), Take by mouth., Disp: , Rfl:   Allergies  Review of patient's allergies indicates:   Allergen Reactions    No known drug allergies        Review of Systems       See above   Review of Systems  Review of Systems  All other systems reviewed and are negative.    Objective:      Physcial exam limited d/t virtual visit    Physical Exam  Constitutional:       Appearance: Normal appearance.   Pulmonary:      Effort: Pulmonary effort is normal. No respiratory distress.   Musculoskeletal:      Cervical back: Normal range of motion. No rigidity.   Neurological:      Mental Status: She is alert and oriented to person, place, and time.     Laboratory Data: reviewed most recent   Imaging: reviewed most recent    Assessment:   No diagnosis found.    1. Increased risk of breast cancer   * Tyrer-Cuzick (TC) lifetime risk of 19.1% (22.8% on mammogram). We discussed that TC score will categorize your lifetime risk of being diagnosed with breast cancer. Categories are as such: Average risk <15%, Intermediate risk 15-19%, and High risk > or = to 20%.    *The Yelitza Model for Breast Cancer risk: She has a 0.7% 5-year breast cancer risk (Compared with 0.4% for the average 37 y.o. woman) and 17.9% Lifetime breast cancer risk (Compared with 12.5% for the average 37 y.o. woman). A woman's risk is considered low if her five-year risk of developing breast cancer is less than 1.6%; it is considered high if she scores above 1.66%. (All women who are over 60  have a score of at least 1.66 and are considered high risk, based on the Yelitza Model.)    Recommendation for women with elevated TC score:     Recommendation for women with elevated Yelitza Model.       Risk factors are categorized into 2 groups: Modifiable and Non-modifiable. Modifiable risk factors include use of hormones, alcohol, smoking, diet and exercise. Non-modifiable risk factors include breast density, genetics, chest radiation, previous pregnancies, age of first period, and age of menopause.      * For women at high risk for breast cancer, endocrine therapy can reduce the risk of invasive and/or in situ breast cancers. (tamoxifen for premenopausal or postmenopausal women and raloxifene or exemestane for postmenopausal women).     * Discussed that Tamoxifen 20 mg daily for 5 years has shown to reduce risk of breast cancer by 49% and women with ADH/ALH or LCIS have an even more significant reduction of risk of 86%. Aromatase inhibitors for 5 years have also shown risk reduction in terms of 50-60%. At current, there is not adequate data to recommend longer courses of therapy more than 5 years for risk reduction.      * Tamoxifen has limited data in BRCA 1/2 mutation carriers but limited retrospective data is suggestive of benefit. There is retrospective data that aromatase inhibitors can reduce the risk of contralateral ER positive breast cancers in BRCA 1/2 patients who were taking AIs as adjuvant therapy. There is no data for raloxifen in the population.      * Reviewed risks of Tamoxifen side effects include hot flashes, invasive endometrial cancer in women > 49 years of age (2.3/1000 compared to 0.9/1000), cataracts, increased risk of pulmonary embolism among others.     * Reviewed Lifestyle modifications which have shown benefit:  Limit alcohol consumption to less than 1 drink per day (1 ounce liquor, 6 oz wine, 8 oz beer)  Avoid smoking.  Exercise at least 150 minutes per week of moderate intensity aerobic  activity or at least 75 minutes of vigorous activity. Exercise can lower the relative risk of breast cancer by ~18-20%.  Maintain healthy weight and avoid post-menopausal weight gain. Avoid processed foods and eat more lean proteins, fruits and vegetables.                  * Discussed available resources including genetic counseling, nutrition, weight management.      * Reviewed future screening:   Semiannual (every 6 months) CBE (clinical breast exam).   Annual Breast MRI alternating with an annual MMG. if TC 20% or greater.     FDA  link about Gadolinium exposure:      https://www.fda.gov/drugs/drug-safety-and-availability/fda-drug-safety-communication-fda-identifies-no-harmful-effects-date-brain-retention-gadolinium     https://www.fda.gov/Drugs/DrugSafety/srx696078.htm               Discussed with patient that there are several models available for stratifying breast cancer risk, and Saturnino-Brendan is presently the model utilized by [facility] and is a model recommended per current NCCN guidelines.    The Yelitza Model for Breast Cancer risk estimates the absolute 5 year risk and lifetime risk of developing breast cancer. Family history includes only first degree relatives with breast cancer, which is not enough information to estimate the risk of a patient having BRCA mutation. It also underestimates the cancer risk for patients with extensive family history. The Yelitza Model is a good predictor of risk for populations but not for individuals. It adjusts risk for race/ethnicity. It may underestimate breast cancer risk in patients with atypical hyperplasia and strong family history. The Yelitza Model was NOT designed to estimate risk for: Women with a prior diagnosis of breast cancer, lobular carcinoma in situ (LCIS), or ductal carcinoma in situ (DCIS);  Women who have received previous radiation therapy to the chest for treatment of Hodgkin lymphoma;  Women with gene mutations in BRCA1 or BRCA2, or those who  are known to have certain genetic syndromes that increase risk for breast cancer; Women of age <35 or >85.    Plan:     Patient has opted out of chemoprevention but will call in the future if she wishes to pursue. Not recommended at this time given Yelitza score of 0.7%  Patient elects to proceed with alternating annual mammogram and annual breast MRI along with semiannual CBEs.  Patient will follow up with PCP or GYN for one semiannual CBE along with annual mammogram in March 2024 and will RTC here for one semiannual CBE along with annual breast MRI in September 2024.  Will get mammogram now  Lifestyle modifications as detailed above.   5.   Encouraged breast awareness, including monthly breast self-exams.   6.   Refer for Genetic counseling.     RTC in 6 months to see me [facility] at [facility] or on 3rd floor..     Questions were encouraged and answered to patient's satisfaction, and patient verbalized understanding of information and agreement with the plan. Advised patient to RTC with any interval changes or concerns.      KARL Glover      Med Onc Chart Routing      Follow up with physician    Follow up with KALEE 6 months. September 2023   Infusion scheduling note    Injection scheduling note    Labs    Imaging Mammogram and MRI   Mammogram now, MRI 6 months from mammogram (October 2023)   Pharmacy appointment    Other referrals         KARL Glover  Hematology & Medical Oncology   [facility] [place] [address]  ph. [phone]  Fax. [phone]    Total time spent with the patient: 45 minutes, 35 minutes of face to face consultation and 10 minutes of chart review and coordination of care, on the day of the visit. This includes face to face time and non-face to face time preparing to see the patient (eg, review of tests), obtaining and/or reviewing separately obtained history, documenting clinical information in the electronic or other health record, independently interpreting  resultsand communicating results to the patient/family/caregiver, or care coordination.

## 2023-03-29 ENCOUNTER — PATIENT MESSAGE (OUTPATIENT)
Dept: HEMATOLOGY/ONCOLOGY | Facility: CLINIC | Age: 38
End: 2023-03-29
Payer: COMMERCIAL

## 2023-06-26 ENCOUNTER — PATIENT MESSAGE (OUTPATIENT)
Dept: PRIMARY CARE CLINIC | Facility: CLINIC | Age: 38
End: 2023-06-26
Payer: COMMERCIAL

## 2023-06-27 ENCOUNTER — TELEPHONE (OUTPATIENT)
Dept: INTERNAL MEDICINE | Facility: CLINIC | Age: 38
End: 2023-06-27
Payer: COMMERCIAL

## 2023-06-27 DIAGNOSIS — R63.5 WEIGHT GAIN: Primary | ICD-10-CM

## 2023-06-27 DIAGNOSIS — Z80.3 FAMILY HISTORY OF BREAST CANCER IN MOTHER: ICD-10-CM

## 2023-06-29 ENCOUNTER — PATIENT MESSAGE (OUTPATIENT)
Dept: HEMATOLOGY/ONCOLOGY | Facility: CLINIC | Age: 38
End: 2023-06-29
Payer: COMMERCIAL

## 2023-10-03 ENCOUNTER — NUTRITION (OUTPATIENT)
Dept: NUTRITION | Facility: CLINIC | Age: 38
End: 2023-10-03
Payer: COMMERCIAL

## 2023-10-03 VITALS — WEIGHT: 196 LBS | HEIGHT: 62 IN | BODY MASS INDEX: 36.07 KG/M2

## 2023-10-03 DIAGNOSIS — Z80.3 FAMILY HISTORY OF BREAST CANCER IN MOTHER: ICD-10-CM

## 2023-10-03 DIAGNOSIS — R63.5 WEIGHT GAIN: ICD-10-CM

## 2023-10-03 DIAGNOSIS — Z71.3 DIETARY COUNSELING: ICD-10-CM

## 2023-10-03 PROCEDURE — 99999 PR PBB SHADOW E&M-EST. PATIENT-LVL III: ICD-10-PCS | Mod: PBBFAC,,,

## 2023-10-03 PROCEDURE — 97802 MEDICAL NUTRITION INDIV IN: CPT | Mod: S$GLB,,, | Performed by: DIETITIAN, REGISTERED

## 2023-10-03 PROCEDURE — 97802 PR MED NUTR THER, 1ST, INDIV, EA 15 MIN: ICD-10-PCS | Mod: S$GLB,,, | Performed by: DIETITIAN, REGISTERED

## 2023-10-03 PROCEDURE — 99999 PR PBB SHADOW E&M-EST. PATIENT-LVL III: CPT | Mod: PBBFAC,,,

## 2023-10-03 NOTE — PROGRESS NOTES
"Referring Physician:Mariama Reed NP     Reason for visit:  Chief Complaint   Patient presents with    Obesity    Nutrition Counseling      Initial Visit    :1985     Allergies Reviewed  Meds Reviewed    Anthropometrics  Weight:88.9 kg (195 lb 15.8 oz)  Height:5' 2" (1.575 m)  BMI:Body mass index is 35.85 kg/m².   IBW: 50.1 kg  +/-10%    Meds:  No outpatient medications prior to visit.     No facility-administered medications prior to visit.       Food/Drug Interactions Noted:  n/a    Vitamins/Supplements/Herbs:  n/a    Labs: reviewed     Nutrition Prescription: 1503 Kcals/day( 30 kcal/kg IBW),  50 g protein( 1.0 g/kg IBW)     Support System:   pt works full time, and has 3 children (ages 7-4-2).  She does the family grocery shopping and cooking.    Diet Hx:  pt states "I need a plan" to help me lose weight.  States she generally eats a healthy diet regimen.  Keeps snacks in her office, ie hard boiled egg, Light & Fit yogurt, dried fruit granola, string cheese, Skinny Pop popcorn,  but doesn't snack every day.  Doesn't read food labels for calories.  Drinks an occasional glass of red wine.    Breakfast: homemade protein shake:  unsweetened almond milk, 2 scoops protein powder, frozen mixed fruit or pineapple and a banana.  Drinks one cup of coffee with vanilla almond milk creamer.  Lunch: 1/2 bag salad with grilled chicken or egg or chicken salad (prepared with full fat gordillo) or a hamburger jeanne.  Water.  Dinner:   last night:  crock pot chicken & dumplings with peas.  Water.    Current activity level and/or physical limitations:  sits at a desk all day at work; does take a walk or bike ride with  and children on the weekends.  Occasionally can get in some early morning exercise while her children sleep.    Motivation to make changes/anticipated barriers and/or expected adherence:  pt seems motivated to make changes and follow recommendations made today.  Seemed to identify ways to make those " changes.    Nutrition-Focus Physical Findings:   pt appears well nourished    Assessment: pt very attentive and asked relevant questions about foods/beverages recommended and to avoid; reading food labels; sample meal plan and menus; portion control; grocery shopping and cooking tips; healthy snacking; role of exercise in weight management.  All of her questions were answered, and she verbalized understanding of information.    Nutrition Diagnosis:  Food- and nutrition-related knowledge deficit RT lack of prior exposure to information AEB dx obesity    Recommendations:  1500 calorie, low fat, high fiber mediterranean diet. Exercise goal:  30 minutes per day, 3-5 days per week.  Handouts provided and reviewed:  My Plate Planner; Cardiac-TLC Nutrition Therapy; 1500 Calorie Sample 5-Day Menus; Servings of Carbohydrates for Meal Planning; Reading A Food Label; Tips to Support Weight Loss; Weight Loss Tips, Heart-Healthy Cooking Tips, Heart Healthy Shopping Tips;  Eat Fit Plan...Anytime/Anywhere; Shop Fit-Get Fit Shopping List; [facility] Snack List for Diabetes; Exercise & Weight-Losing It and Keeping It Off (NLA); Achieving A Healthy Weight (NLA); Setting Goals for Weight Management      Strategies Implemented:  portion control; keep a food journal; read food labels    Consultation Time:45 minutes.  Communicated with referring healthcare provider:  Consult note available in pt's Epic chart per MD discretion  Follow Up:  Pt provided with dietitian contact information and advised to contact me with questions or make future appointment if further intervention needed.

## 2023-10-03 NOTE — PATIENT INSTRUCTIONS
1500 calorie, low fat, high fiber mediterranean diet.  Exercise goal:  30 minutes per day, 3-5 days per week.

## 2024-01-01 NOTE — ED NOTES
Dr. Wright at bedside.    Progress Note reviewed and summarized for off-service hand off on ________ by _________ .     RSV PROPHYLAXIS:   Maternal RSV vaccine [Abrysvo]: [ _ ] Yes  [ _ ] No  SYNAGIS [palivizumab] candidate [ _ ] Yes  [ _ ] No;   Received SYNAGIS [palivizumab]? : [ _ ] Yes  [ _ ] No,  IF yes, date _________        or   [ _ ] ELIGIBLE AT A LATER DATE   - [ _ ]<29 weeks      [ _ ]<32 weeks and O2 use rupali 28 days    [ _ ]  other criteria.   Received BEYFORTUS [Nirsevimab] [ _ ] Yes  [ _ ] No  IF yes, date _________         or    [ _ ] Declined RSV Prophylaxis     CIrcumcision:   Hip US rec:    Neurodevelop eval?	  CPR class done?  	  PVS at DC?  Vit D at DC?	  FE at DC?    G6PD screen sent on  ____ . Result ______ . 	    PMD:          Name:  ______________ _             Contact information:  ______________ _  Pharmacy: Name:  ______________ _              Contact information:  ______________ _    Follow-up appointments (list):      [ _ ] Discharge time spent >30 min    [ _ ] Car Seat Challenge lasting 90 min was performed. Today I have reviewed and interpreted the nurses’ records of pulse oximetry, heart rate and respiratory rate and observations during testing period. Car Seat Challenge  passed. The patient is cleared to begin using rear-facing car seat upon discharge. Parents were counseled on rear-facing car seat use.     Progress Note reviewed and summarized for off-service hand off on ________ by _________ .     RSV PROPHYLAXIS:   Maternal RSV vaccine [Abrysvo]: [ _ ] Yes  [ _ ] No  SYNAGIS [palivizumab] candidate [ _ ] Yes  [ _ ] No;   Received SYNAGIS [palivizumab]? : [ _ ] Yes  [ _ ] No,  IF yes, date _________        or   [ _ ] ELIGIBLE AT A LATER DATE   - [ _ ]<29 weeks      [ _ ]<32 weeks and O2 use rupali 28 days    [ _ ]  other criteria.   Received BEYFORTUS [Nirsevimab] [ _ ] Yes  [ _ ] No  IF yes, date _________         or    [ _ ] Declined RSV Prophylaxis     CIrcumcision:   Hip US rec:    Neurodevelop eval?	  CPR class done?  	  PVS at DC?  Vit D at DC?	  FE at DC?    G6PD screen sent on  ____ . Result ______ . 	    PMD:          Name:  ______________ _             Contact information:  ______________ _  Pharmacy: Name:  ______________ _              Contact information:  ______________ _    Follow-up appointments (list):      [ _ ] Discharge time spent >30 min    [ _ ] Car Seat Challenge lasting 90 min was performed. Today I have reviewed and interpreted the nurses’ records of pulse oximetry, heart rate and respiratory rate and observations during testing period. Car Seat Challenge  passed. The patient is cleared to begin using rear-facing car seat upon discharge. Parents were counseled on rear-facing car seat use.     Progress Note reviewed and summarized for off-service hand off on ________ by _________ .     RSV PROPHYLAXIS:   Maternal RSV vaccine [Abrysvo]: [ _ ] Yes  [ _ ] No  SYNAGIS [palivizumab] candidate [ _ ] Yes  [ _ ] No;   Received SYNAGIS [palivizumab]? : [ _ ] Yes  [ _ ] No,  IF yes, date _________        or   [ _ ] ELIGIBLE AT A LATER DATE   - [ _ ]<29 weeks      [ _ ]<32 weeks and O2 use rupali 28 days    [ _ ]  other criteria.   Received BEYFORTUS [Nirsevimab] [ _ ] Yes  [ _ ] No  IF yes, date _________         or    [ _ ] Declined RSV Prophylaxis     CIrcumcision:   Hip US rec:    Neurodevelop eval?	  CPR class done?  	  PVS at DC?  Vit D at DC?	  FE at DC?    G6PD screen sent on  ____ . Result ______ . 	    PMD:          Name:  ______________ _             Contact information:  ______________ _  Pharmacy: Name:  ______________ _              Contact information:  ______________ _    Follow-up appointments (list):      [ _ ] Discharge time spent >30 min    [ _ ] Car Seat Challenge lasting 90 min was performed. Today I have reviewed and interpreted the nurses’ records of pulse oximetry, heart rate and respiratory rate and observations during testing period. Car Seat Challenge  passed. The patient is cleared to begin using rear-facing car seat upon discharge. Parents were counseled on rear-facing car seat use.     Progress Note reviewed and summarized for off-service hand off on ________ by _________ .     RSV PROPHYLAXIS:   Maternal RSV vaccine [Abrysvo]: [ _ ] Yes  [ _ ] No  SYNAGIS [palivizumab] candidate [ _ ] Yes  [ _ ] No;   Received SYNAGIS [palivizumab]? : [ _ ] Yes  [ _ ] No,  IF yes, date _________        or   [ _ ] ELIGIBLE AT A LATER DATE   - [ _ ]<29 weeks      [ _ ]<32 weeks and O2 use rupali 28 days    [ _ ]  other criteria.   Received BEYFORTUS [Nirsevimab] [ _ ] Yes  [ _ ] No  IF yes, date _________         or    [ _ ] Declined RSV Prophylaxis     CIrcumcision:   Hip US rec:    Neurodevelop eval?	  CPR class done?  	  PVS at DC?  Vit D at DC?	  FE at DC?    G6PD screen sent on  ____ . Result ______ . 	    PMD:          Name:  ______________ _             Contact information:  ______________ _  Pharmacy: Name:  ______________ _              Contact information:  ______________ _    Follow-up appointments (list):      [ _ ] Discharge time spent >30 min    [ _ ] Car Seat Challenge lasting 90 min was performed. Today I have reviewed and interpreted the nurses’ records of pulse oximetry, heart rate and respiratory rate and observations during testing period. Car Seat Challenge  passed. The patient is cleared to begin using rear-facing car seat upon discharge. Parents were counseled on rear-facing car seat use.     Progress Note reviewed and summarized for off-service hand off on ________ by _________ .     RSV PROPHYLAXIS:   Maternal RSV vaccine [Abrysvo]: [ _ ] Yes  [ _ ] No  SYNAGIS [palivizumab] candidate [ _ ] Yes  [ _ ] No;   Received SYNAGIS [palivizumab]? : [ _ ] Yes  [ _ ] No,  IF yes, date _________        or   [ _ ] ELIGIBLE AT A LATER DATE   - [ _ ]<29 weeks      [ _ ]<32 weeks and O2 use rupali 28 days    [ _ ]  other criteria.   Received BEYFORTUS [Nirsevimab] [ _ ] Yes  [ _ ] No  IF yes, date _________         or    [ _ ] Declined RSV Prophylaxis     CIrcumcision:   Hip US rec:    Neurodevelop eval?	  CPR class done?  	  PVS at DC?  Vit D at DC?	  FE at DC?    G6PD screen sent on  ____ . Result ______ . 	    PMD:          Name:  ______________ _             Contact information:  ______________ _  Pharmacy: Name:  ______________ _              Contact information:  ______________ _    Follow-up appointments (list):      [ _ ] Discharge time spent >30 min    [ _ ] Car Seat Challenge lasting 90 min was performed. Today I have reviewed and interpreted the nurses’ records of pulse oximetry, heart rate and respiratory rate and observations during testing period. Car Seat Challenge  passed. The patient is cleared to begin using rear-facing car seat upon discharge. Parents were counseled on rear-facing car seat use.

## 2024-01-30 ENCOUNTER — OFFICE VISIT (OUTPATIENT)
Dept: URGENT CARE | Facility: CLINIC | Age: 39
End: 2024-01-30
Payer: COMMERCIAL

## 2024-01-30 VITALS
HEART RATE: 86 BPM | OXYGEN SATURATION: 98 % | RESPIRATION RATE: 17 BRPM | WEIGHT: 195 LBS | DIASTOLIC BLOOD PRESSURE: 82 MMHG | SYSTOLIC BLOOD PRESSURE: 126 MMHG | HEIGHT: 62 IN | BODY MASS INDEX: 35.88 KG/M2

## 2024-01-30 DIAGNOSIS — Z11.59 ENCOUNTER FOR SCREENING FOR VIRAL DISEASE: ICD-10-CM

## 2024-01-30 DIAGNOSIS — J02.9 SORE THROAT: ICD-10-CM

## 2024-01-30 DIAGNOSIS — J34.89 NASAL CONGESTION WITH RHINORRHEA: ICD-10-CM

## 2024-01-30 DIAGNOSIS — J06.9 VIRAL URI: Primary | ICD-10-CM

## 2024-01-30 DIAGNOSIS — R09.81 NASAL CONGESTION WITH RHINORRHEA: ICD-10-CM

## 2024-01-30 LAB
CTP QC/QA: YES
CTP QC/QA: YES
MOLECULAR STREP A: NEGATIVE
SARS-COV-2 AG RESP QL IA.RAPID: NEGATIVE

## 2024-01-30 PROCEDURE — 99213 OFFICE O/P EST LOW 20 MIN: CPT | Mod: S$GLB,,, | Performed by: PHYSICIAN ASSISTANT

## 2024-01-30 PROCEDURE — 87811 SARS-COV-2 COVID19 W/OPTIC: CPT | Mod: QW,S$GLB,, | Performed by: PHYSICIAN ASSISTANT

## 2024-01-30 PROCEDURE — 87651 STREP A DNA AMP PROBE: CPT | Mod: QW,S$GLB,, | Performed by: PHYSICIAN ASSISTANT

## 2024-01-30 RX ORDER — FLUTICASONE PROPIONATE 50 MCG
1 SPRAY, SUSPENSION (ML) NASAL 2 TIMES DAILY PRN
Qty: 16 G | Refills: 0 | Status: SHIPPED | OUTPATIENT
Start: 2024-01-30 | End: 2024-03-21

## 2024-01-30 RX ORDER — CETIRIZINE HYDROCHLORIDE 10 MG/1
10 TABLET ORAL DAILY PRN
Qty: 30 TABLET | Refills: 0 | Status: SHIPPED | OUTPATIENT
Start: 2024-01-30 | End: 2024-03-21

## 2024-01-30 NOTE — PROGRESS NOTES
"Subjective:      Patient ID: Charis Wells is a 38 y.o. female.    Vitals:  height is 5' 2" (1.575 m) and weight is 88.5 kg (195 lb). Her blood pressure is 126/82 and her pulse is 86. Her respiration is 17 and oxygen saturation is 98%.     Chief Complaint: Sore Throat    This is a 38 y.o. female who presents today with a chief complaint of postnasal drip, Sore throat, runny nose, and headaches. Symptoms started last night. Exposed to Strep from her nieces and nephew.  No other associated symptoms.  Not taking anything for symptoms.  Requesting strep testing done.    Sore Throat   This is a new problem. The current episode started yesterday. The problem has been gradually worsening. Neither side of throat is experiencing more pain than the other. There has been no fever. The pain is at a severity of 7/10. The pain is moderate. Associated symptoms include congestion, headaches and swollen glands. Pertinent negatives include no abdominal pain, coughing, diarrhea, ear pain, neck pain, shortness of breath, stridor, trouble swallowing or vomiting. She has had exposure to strep. She has tried nothing for the symptoms. The treatment provided no relief.       Constitution: Negative for activity change, appetite change, chills, sweating, fatigue, fever and generalized weakness.   HENT:  Positive for congestion, postnasal drip and sore throat. Negative for ear pain, hearing loss, facial swelling, sinus pain, sinus pressure, trouble swallowing and voice change.    Neck: Negative for neck pain, neck stiffness and painful lymph nodes.   Cardiovascular:  Negative for chest pain, leg swelling, palpitations, sob on exertion and passing out.   Eyes:  Negative for eye discharge, eye pain, photophobia, vision loss, double vision and blurred vision.   Respiratory:  Negative for chest tightness, cough, sputum production, bloody sputum, COPD, shortness of breath, stridor, wheezing and asthma.    Gastrointestinal:  Negative for " abdominal pain, nausea, vomiting, constipation, diarrhea, bright red blood in stool, rectal bleeding, heartburn and bowel incontinence.   Genitourinary:  Negative for dysuria, frequency, urgency, urine decreased, flank pain, bladder incontinence and hematuria.   Musculoskeletal:  Negative for trauma, joint pain, joint swelling, abnormal ROM of joint, muscle cramps and muscle ache.   Skin:  Negative for color change, pale, rash and wound.   Allergic/Immunologic: Negative for seasonal allergies, asthma and immunocompromised state.   Neurological:  Positive for headaches. Negative for dizziness, history of vertigo, light-headedness, passing out, facial drooping, speech difficulty, coordination disturbances, loss of balance, disorientation, altered mental status, loss of consciousness, numbness, tingling and seizures.   Hematologic/Lymphatic: Negative for swollen lymph nodes, easy bruising/bleeding and trouble clotting. Does not bruise/bleed easily.   Psychiatric/Behavioral:  Negative for altered mental status and disorientation.       Objective:     Physical Exam   Constitutional: She is oriented to person, place, and time. She appears well-developed. She is cooperative. She does not appear ill. No distress.   HENT:   Head: Normocephalic.   Ears:   Right Ear: Hearing, external ear and ear canal normal. No no drainage, swelling or tenderness. No mastoid tenderness.   Left Ear: Hearing, external ear and ear canal normal. No no drainage, swelling or tenderness. No mastoid tenderness.   Nose: Nose normal. No rhinorrhea or congestion. Right sinus exhibits no maxillary sinus tenderness and no frontal sinus tenderness. Left sinus exhibits no maxillary sinus tenderness and no frontal sinus tenderness.   Mouth/Throat: Uvula is midline, oropharynx is clear and moist and mucous membranes are normal. Mucous membranes are moist. No oral lesions. No trismus in the jaw. No uvula swelling. No oropharyngeal exudate, posterior  oropharyngeal edema, posterior oropharyngeal erythema or tonsillar abscesses. No tonsillar exudate. Oropharynx is clear.   Eyes: Conjunctivae, EOM and lids are normal. Right eye exhibits no discharge. Left eye exhibits no discharge. Right conjunctiva is not injected. Right conjunctiva has no hemorrhage. Left conjunctiva is not injected. Left conjunctiva has no hemorrhage. Extraocular movement intact vision grossly intact gaze aligned appropriately   Neck: Phonation normal. Neck supple. No neck rigidity present.   Cardiovascular: Normal rate, regular rhythm, normal heart sounds and normal pulses.   No murmur heard.  Pulmonary/Chest: Effort normal and breath sounds normal. No accessory muscle usage. No respiratory distress. She has no wheezes. She exhibits no tenderness.   Abdominal: Normal appearance. She exhibits no distension. Soft. There is no abdominal tenderness. There is no rebound and no guarding.   Musculoskeletal: Normal range of motion.         General: Normal range of motion.      Comments: Moves all extremities with normal tone, strength, and ROM.  Gait normal.   Lymphadenopathy:     She has no cervical adenopathy.        Right cervical: No superficial cervical adenopathy present.       Left cervical: No superficial cervical adenopathy present.   Neurological: no focal deficit. She is alert, oriented to person, place, and time and at baseline. She has normal motor skills and normal sensation. She displays facial symmetry and no dysarthria. She exhibits normal muscle tone. Gait and coordination normal. Coordination normal. GCS eye subscore is 4. GCS verbal subscore is 5. GCS motor subscore is 6.   Skin: Skin is warm, dry and no rash. Capillary refill takes less than 2 seconds.   Psychiatric: She experiences Normal attention. Her speech is normal and behavior is normal. Thought content normal.   Nursing note and vitals reviewed.    Results for orders placed or performed in visit on 01/30/24   SARS  Coronavirus 2 Antigen, POCT Manual Read   Result Value Ref Range    SARS Coronavirus 2 Antigen Negative Negative     Acceptable Yes    POCT Strep A, Molecular   Result Value Ref Range    Molecular Strep A, POC Negative Negative     Acceptable Yes        Assessment:     1. Viral URI    2. Sore throat    3. Nasal congestion with rhinorrhea    4. Encounter for screening for viral disease      Note dictated with voice recognition software, please excuse any grammatical errors.    Patient presents with clinical exam findings and history consistent with above.  We discussed the differential diagnosis.    On exam, patient is nontoxic appearing and vitals are stable.  Patient is essentially neurovascularly intact on exam.      Diagnostic testing results were independently reviewed and interpreted, which were discussed in depth with patient.   Test ordered in clinic:  COVID and strep negative.  Additionally, previous progress notes/admissions/lab were reviewed and interpreted.  CMP 01/19/2023 and and 01/04/2018 with Good kidney function and EGFR.    Plan:     Emphasized importance of prescribed and OTC symptomatic treatment to prevent worsening of condition.        Viral URI  -     cetirizine (ZYRTEC) 10 MG tablet; Take 1 tablet (10 mg total) by mouth daily as needed for Allergies or Rhinitis.  Dispense: 30 tablet; Refill: 0  -     fluticasone propionate (FLONASE) 50 mcg/actuation nasal spray; 1 spray (50 mcg total) by Each Nostril route 2 (two) times daily as needed for Rhinitis or Allergies.  Dispense: 16 g; Refill: 0    Sore throat  -     SARS Coronavirus 2 Antigen, POCT Manual Read  -     POCT Strep A, Molecular  -     cetirizine (ZYRTEC) 10 MG tablet; Take 1 tablet (10 mg total) by mouth daily as needed for Allergies or Rhinitis.  Dispense: 30 tablet; Refill: 0  -     fluticasone propionate (FLONASE) 50 mcg/actuation nasal spray; 1 spray (50 mcg total) by Each Nostril route 2 (two) times  daily as needed for Rhinitis or Allergies.  Dispense: 16 g; Refill: 0    Nasal congestion with rhinorrhea  -     cetirizine (ZYRTEC) 10 MG tablet; Take 1 tablet (10 mg total) by mouth daily as needed for Allergies or Rhinitis.  Dispense: 30 tablet; Refill: 0  -     fluticasone propionate (FLONASE) 50 mcg/actuation nasal spray; 1 spray (50 mcg total) by Each Nostril route 2 (two) times daily as needed for Rhinitis or Allergies.  Dispense: 16 g; Refill: 0    Encounter for screening for viral disease      Note dictated with voice recognition software, please excuse any grammatical errors.    We had shared decision making for patient's treatment. We discussed side effects/alternatives/benefits/risk and patient would like to proceed with treatment plan. We also discussed other OTC treatment recommendations.    Patient was counseled, explained with the test results meaning, expected course, and answered all of questions. They can also receive results via my chart.        Patient was instructed to return for re-evaluation with urgent care or PCP for continued outpatient workup and management if symptoms do not improve/worsening symptoms. Strict ED versus clinic precautions given in depth.   Guideline, discharge and follow-up instructions given verbally/printed; patient will also receive via [facility]. Patient verbalized understanding and agreed with the entirety of plan of care.         Additional MDM:     Heart Failure Score:   COPD = No        Patient Instructions     PLEASE READ YOUR DISCHARGE INSTRUCTIONS ENTIRELY AS IT CONTAINS IMPORTANT INFORMATION.    Patient had covid testing done today.      If Negative and has direct exposure:  Symptom free without fever reducing meds for 24 hours may return to work with a mask on for the next 7-10 days at all times.  Return for COVID testing here if symptoms worsens in 5-7 days. Recommend repeat testing at home every 48 hours for 7 days.  Return sooner for evaluation if new or  worsening symptoms.  You may also use home COVID test to check.    Discussed corona virus precautions and reviewed CDC FAC; printed a copy for patient.  I discussed to continue to monitor their symptoms. Discussed that if their symptoms persist or worsen to seek re-evaluation. Clinic vs. ER precautions were given.  Patient verbalized understanding and agreed with the above plan of care.    - Reviewed radiographs and all diagnostic testing with patient/family.    - Rest.  Drink plenty of fluids.    - Tylenol/anti-inflammatory(ibuprofen/motrin/aleve) as directed as needed for fever/pain.  For Tylenol, do not exceed 3000 mg/ day. If no contraindication.  -OK to supplement with OTC DayQuil, NyQuil or TheraFlu every 6 hours as needed for cough and congestion.  Use caution of total amount of Tylenol/acetaminophen per day.    -Below are suggestions for symptomatic relief:              -Salt water gargles to soothe throat pain.              -Chloroseptic spray also helps to numb throat pain.              -Nasal saline spray reduces inflammation and dryness.  Drink hot tea with lemon to help soothe your sore throat.              -Warm face compresses to help with facial sinus pain/pressure.              -Vicks vapor rub at night.              -Flonase OTC or Nasacort OTC  once or twice a day a day for nasal/sinus congestion and runny nose. DON'T USE IF YOU HAVE GLAUCOMA. CHECK WITH YOUR PHARMACIST/PHYSICIAN.              -Simple foods like chicken noodle soup.              -Mucinex DM every 12 hours (ANY COUGH EXPECTORANT--guaifenesin) for cough or chest congestion with mucus    (ANY COUGH SUPPRESSANT--dextromethorphan) helps with coughing at night. Mucinex-DM if you have chest congestion or sputum (caution if history of high blood pressure or palpitations).              -Zyrtec/Claritin/xyzal during the day time  & Benadryl at night (only if severe runny nose) may help with allergies and runny nose. Add decongestant if you  have nasal/sinus congestion/sinus pressure/ear fullness sensation. (see below)     Caution with use of Decongestant meds:  -If you DO NOT have Hypertension or any history of palpitations, it is ok to take over the counter Sudafed or Mucinex D or Allegra-D or Claritin-D or Zyrtec-D.  -If you do take one of the above, it is ok to combine that with plain over the counter Mucinex or Allegra or Claritin or Zyrtec. If, for example, you are taking Zyrtec -D, you can combine that with Mucinex, but not Mucinex-D.  If you are taking Mucinex-D, you can combine that with plain Allegra or Claritin or Zyrtec.     -Do not combine pseudophed or phenylephrine with any other brand allergy-D for DECONGESTANT.   -Or vice versa, you can you take plain allergy medications (allegra/claritin/zyrtec with NO Decongestant) and ADD OTC pseudophed or phenelyphrine 3 times a day. Avoid taking decongestant late at night or with caffeine as it can keep you up or cause jittery feeling.  -If you DO have Hypertension , anxiety, or palpitations, it is safe to take Coricidin HBP for relief of cough, congestion, or sinus symptoms every 4-6hours.      For your GI symptoms:  -Use gatorade/pedialyte or rehydration packets to help stay hydrated. Vitamin water and plain water do not contain rehydrating electrolytes.  -Increase clear liquids (water, gatorade, pedialyte, broths, jello, etc) If you develop nausea/vomiting/diarrhea, Hold off on solids for 12-18 hours. Then advance to BRAT diet (banana, rice, applesauce, tea, toast/crackers), then advance further as tolerated. Avoid spicy or fatty foods.   -Please go to the ER if you experience worsening abdominal pain, blood in your vomit or stool, high fever, dizziness, fainting, swelling of your abdomen, inability to pass gas or stool, or inability to urinate.       -You must understand that you've received an Urgent Care treatment only and that you may be released before all your medical problems are known or  treated. You, the patient, will arrange for follow up care as instructed. Please arrange follow up with your primary medical clinic within 2-5 days if your signs and symptoms have not resolved or worsen.   - Follow up with your PCP or specialty clinic as directed.  You can call [phone] or [phone] to schedule an appointment with the appropriate provider.    - If your condition worsens or fails to improve we recommend that you receive another evaluation at the emergency room immediately or contact your primary medical clinic to discuss your concerns.       Prevention steps for patients with confirmed or suspected COVID-19  Stay home and stay away from family members and friends. The CDC says, you can leave home after these three things have happened: 1) You have had no fever for at least 24 hours (that is one full day of no fever without the use of medicine that reduces fevers) 2) AND other symptoms have improved (for example, when your cough or shortness of breath have improved) 3) AND at least 7 days have passed since your symptoms first appeared OR after 5 days passed from first positive test (with continued mask use on day 6-10 till day #11 from Covid positive test result).  Separate yourself from other people and animals in your home.  Call ahead before visiting your doctor.  Wear a facemask.  Cover your coughs and sneezes.  Wash your hands often with soap and water; hand  can be used, too.  Avoid sharing personal household items.  Wipe down surfaces used daily.  Monitor your symptoms. Seek prompt medical attention if your illness is worsening (e.g., difficulty breathing).   Before seeking care, call your healthcare provider.  If you have a medical emergency and need to call 911, notify the dispatch personnel that you have, or are being evaluated for COVID-19. If possible, put on a facemask before emergency medical services arrive.      Recommended precautions for household members, intimate  partners, and caregivers in a home setting of a patient with symptomatic laboratory-confirmed COVID-19 or a patient under investigation.  Household members, intimate partners, and caregivers in the home setting awaiting tests results have close contact with a person with symptomatic, laboratory-confirmed COVID-19 or a person under investigation. Close contacts should monitor their health; they should call their provider right away if they develop symptoms suggestive of COVID-19 (e.g., fever, cough, shortness of breath).    Close contacts should also follow these recommendations:  Make sure that you understand and can help the patient follow their provider's instructions for medication(s) and care. You should help the patient with basic needs in the home and provide support for getting groceries, prescriptions, and other personal needs.  Monitor the patient's symptoms. If the patient is getting sicker, call his or her healthcare provider and tell them that the patient has laboratory-confirmed COVID-19. If the patient has a medical emergency and you need to call 911, notify the dispatch personnel that the patient has, or is being evaluated for COVID-19.  Household members should stay in another room or be  from the patient. Household members should use a separate bedroom and bathroom, if available.  Prohibit visitors.  Household members should care for any pets in the home.  Make sure that shared spaces in the home have good air flow, such as by an air conditioner or an opened window, weather permitting.  Perform hand hygiene frequently. Wash your hands often with soap and water for at least 20 seconds or use an alcohol-based hand  (that contains > 60% alcohol) covering all surfaces of your hands and rubbing them together until they feel dry. Soap and water should be used preferentially.  Avoid touching your eyes, nose, and mouth.  The patient should wear a facemask. If the patient is not able to  wear a facemask (for example, because it causes trouble breathing), caregivers should wear a mask when they are in the same room as the patient.  Wear a disposable facemask and gloves when you touch or have contact with the patient's blood, stool, or body fluids, such as saliva, sputum, nasal mucus, vomit, urine.  Throw out disposable facemasks and gloves after using them. Do not reuse.  When removing personal protective equipment, first remove and dispose of gloves. Then, immediately clean your hands with soap and water or alcohol-based hand . Next, remove and dispose of facemask, and immediately clean your hands again with soap and water or alcohol-based hand .  You should not share dishes, drinking glasses, cups, eating utensils, towels, bedding, or other items with the patient. After the patient uses these items, you should wash them thoroughly (see below Wash laundry thoroughly).  Clean all high-touch surfaces, such as counters, tabletops, doorknobs, bathroom fixtures, toilets, phones, keyboards, tablets, and bedside tables, every day. Also, clean any surfaces that may have blood, stool, or body fluids on them.  Use a household cleaning spray or wipe, according to the label instructions. Labels contain instructions for safe and effective use of the cleaning product including precautions you should take when applying the product, such as wearing gloves and making sure you have good ventilation during use of the product.  Wash laundry thoroughly.  Immediately remove and wash clothes or bedding that have blood, stool, or body fluids on them.  Wear disposable gloves while handling soiled items and keep soiled items away from your body. Clean your hands (with soap and water or an alcohol-based hand ) immediately after removing your gloves.  Read and follow directions on labels of laundry or clothing items and detergent. In general, using a normal laundry detergent according to washing  machine instructions and dry thoroughly using the warmest temperatures recommended on the clothing label.  Place all used disposable gloves, facemasks, and other contaminated items in a lined container before disposing of them with other household waste. Clean your hands (with soap and water or an alcohol-based hand ) immediately after handling these items. Soap and water should be used preferentially if hands are visibly dirty.  Discuss any additional questions with your state or local health department or healthcare provider. Check available hours when contacting your local health department.    For more information see CDC link below.      https://www.cdc.gov/coronavirus/2019-ncov/hcp/guidance-prevent-spread.html#precautions        Sources:  [facility], [place] [facility] and [facility]      Instructions for Home Care of Patients and Caretakers with Coronavirus Disease 2019  Limit visitors to the home.  Older persons and those that have chronic medical conditions such as diabetes, lung and heart disease are at increased risk for illness.   If possible, patients should use a separate bedroom while recovering. Caregivers and household members should avoid prolonged contact with the patient which means to stay 6 feet away and avoid contact with cough droplets.  When close contact is necessary, wash your hands before and immediately after contact.   Perform hand hygiene frequently. Wash your hands often with soap and water for at least 20 seconds or use an alcohol-based hand , covering all surfaces of your hands and rubbing them together until they feel dry.   Avoid touching your eyes, nose, and mouth with unwashed hands.  Avoid sharing household items with the patient. You should not share dishes, drinking glasses, cups, eating utensils, towels, bedding, or other items. After the patient uses these items, you should wash them thoroughly.  Wash laundry thoroughly.   Immediately remove and wash  clothes or bedding that have blood, stool, or body fluids on them.  Clean all high-touch surfaces, such as counters, tabletops, doorknobs, bathroom fixtures, toilets, phones, keyboards, tablets, and bedside tables, every day.   Use a household cleaning spray or wipe, according to the label instructions. Labels contain instructions for safe and effective use of the cleaning product including precautions you should take when applying the product, such as wearing gloves and making sure you have good ventilation during use of the product.    For more information see CDC link below.      https://www.cdc.gov/coronavirus/2019-ncov/hcp/guidance-prevent-spread.html#precautions               If your symptoms worsen or if you have any other concerns, please contact [facility] at [phone].

## 2024-01-31 NOTE — PATIENT INSTRUCTIONS
PLEASE READ YOUR DISCHARGE INSTRUCTIONS ENTIRELY AS IT CONTAINS IMPORTANT INFORMATION.    Patient had covid testing done today.      If Negative and has direct exposure:  Symptom free without fever reducing meds for 24 hours may return to work with a mask on for the next 7-10 days at all times.  Return for COVID testing here if symptoms worsens in 5-7 days. Recommend repeat testing at home every 48 hours for 7 days.  Return sooner for evaluation if new or worsening symptoms.  You may also use home COVID test to check.    Discussed corona virus precautions and reviewed CDC FAC; printed a copy for patient.  I discussed to continue to monitor their symptoms. Discussed that if their symptoms persist or worsen to seek re-evaluation. Clinic vs. ER precautions were given.  Patient verbalized understanding and agreed with the above plan of care.    - Reviewed radiographs and all diagnostic testing with patient/family.    - Rest.  Drink plenty of fluids.    - Tylenol/anti-inflammatory(ibuprofen/motrin/aleve) as directed as needed for fever/pain.  For Tylenol, do not exceed 3000 mg/ day. If no contraindication.  -OK to supplement with OTC DayQuil, NyQuil or TheraFlu every 6 hours as needed for cough and congestion.  Use caution of total amount of Tylenol/acetaminophen per day.    -Below are suggestions for symptomatic relief:              -Salt water gargles to soothe throat pain.              -Chloroseptic spray also helps to numb throat pain.              -Nasal saline spray reduces inflammation and dryness.  Drink hot tea with lemon to help soothe your sore throat.              -Warm face compresses to help with facial sinus pain/pressure.              -Vicks vapor rub at night.              -Flonase OTC or Nasacort OTC  once or twice a day a day for nasal/sinus congestion and runny nose. DON'T USE IF YOU HAVE GLAUCOMA. CHECK WITH YOUR PHARMACIST/PHYSICIAN.              -Simple foods like chicken noodle soup.               -Mucinex DM every 12 hours (ANY COUGH EXPECTORANT--guaifenesin) for cough or chest congestion with mucus    (ANY COUGH SUPPRESSANT--dextromethorphan) helps with coughing at night. Mucinex-DM if you have chest congestion or sputum (caution if history of high blood pressure or palpitations).              -Zyrtec/Claritin/xyzal during the day time  & Benadryl at night (only if severe runny nose) may help with allergies and runny nose. Add decongestant if you have nasal/sinus congestion/sinus pressure/ear fullness sensation. (see below)     Caution with use of Decongestant meds:  -If you DO NOT have Hypertension or any history of palpitations, it is ok to take over the counter Sudafed or Mucinex D or Allegra-D or Claritin-D or Zyrtec-D.  -If you do take one of the above, it is ok to combine that with plain over the counter Mucinex or Allegra or Claritin or Zyrtec. If, for example, you are taking Zyrtec -D, you can combine that with Mucinex, but not Mucinex-D.  If you are taking Mucinex-D, you can combine that with plain Allegra or Claritin or Zyrtec.     -Do not combine pseudophed or phenylephrine with any other brand allergy-D for DECONGESTANT.   -Or vice versa, you can you take plain allergy medications (allegra/claritin/zyrtec with NO Decongestant) and ADD OTC pseudophed or phenelyphrine 3 times a day. Avoid taking decongestant late at night or with caffeine as it can keep you up or cause jittery feeling.  -If you DO have Hypertension , anxiety, or palpitations, it is safe to take Coricidin HBP for relief of cough, congestion, or sinus symptoms every 4-6hours.      For your GI symptoms:  -Use gatorade/pedialyte or rehydration packets to help stay hydrated. Vitamin water and plain water do not contain rehydrating electrolytes.  -Increase clear liquids (water, gatorade, pedialyte, broths, jello, etc) If you develop nausea/vomiting/diarrhea, Hold off on solids for 12-18 hours. Then advance to BRAT diet (banana, rice,  applesauce, tea, toast/crackers), then advance further as tolerated. Avoid spicy or fatty foods.   -Please go to the ER if you experience worsening abdominal pain, blood in your vomit or stool, high fever, dizziness, fainting, swelling of your abdomen, inability to pass gas or stool, or inability to urinate.       -You must understand that you've received an Urgent Care treatment only and that you may be released before all your medical problems are known or treated. You, the patient, will arrange for follow up care as instructed. Please arrange follow up with your primary medical clinic within 2-5 days if your signs and symptoms have not resolved or worsen.   - Follow up with your PCP or specialty clinic as directed.  You can call [phone] or [phone] to schedule an appointment with the appropriate provider.    - If your condition worsens or fails to improve we recommend that you receive another evaluation at the emergency room immediately or contact your primary medical clinic to discuss your concerns.       Prevention steps for patients with confirmed or suspected COVID-19  Stay home and stay away from family members and friends. The CDC says, you can leave home after these three things have happened: 1) You have had no fever for at least 24 hours (that is one full day of no fever without the use of medicine that reduces fevers) 2) AND other symptoms have improved (for example, when your cough or shortness of breath have improved) 3) AND at least 7 days have passed since your symptoms first appeared OR after 5 days passed from first positive test (with continued mask use on day 6-10 till day #11 from Covid positive test result).  Separate yourself from other people and animals in your home.  Call ahead before visiting your doctor.  Wear a facemask.  Cover your coughs and sneezes.  Wash your hands often with soap and water; hand  can be used, too.  Avoid sharing personal household items.  Wipe  down surfaces used daily.  Monitor your symptoms. Seek prompt medical attention if your illness is worsening (e.g., difficulty breathing).   Before seeking care, call your healthcare provider.  If you have a medical emergency and need to call 911, notify the dispatch personnel that you have, or are being evaluated for COVID-19. If possible, put on a facemask before emergency medical services arrive.      Recommended precautions for household members, intimate partners, and caregivers in a home setting of a patient with symptomatic laboratory-confirmed COVID-19 or a patient under investigation.  Household members, intimate partners, and caregivers in the home setting awaiting tests results have close contact with a person with symptomatic, laboratory-confirmed COVID-19 or a person under investigation. Close contacts should monitor their health; they should call their provider right away if they develop symptoms suggestive of COVID-19 (e.g., fever, cough, shortness of breath).    Close contacts should also follow these recommendations:  Make sure that you understand and can help the patient follow their provider's instructions for medication(s) and care. You should help the patient with basic needs in the home and provide support for getting groceries, prescriptions, and other personal needs.  Monitor the patient's symptoms. If the patient is getting sicker, call his or her healthcare provider and tell them that the patient has laboratory-confirmed COVID-19. If the patient has a medical emergency and you need to call 911, notify the dispatch personnel that the patient has, or is being evaluated for COVID-19.  Household members should stay in another room or be  from the patient. Household members should use a separate bedroom and bathroom, if available.  Prohibit visitors.  Household members should care for any pets in the home.  Make sure that shared spaces in the home have good air flow, such as by an air  conditioner or an opened window, weather permitting.  Perform hand hygiene frequently. Wash your hands often with soap and water for at least 20 seconds or use an alcohol-based hand  (that contains > 60% alcohol) covering all surfaces of your hands and rubbing them together until they feel dry. Soap and water should be used preferentially.  Avoid touching your eyes, nose, and mouth.  The patient should wear a facemask. If the patient is not able to wear a facemask (for example, because it causes trouble breathing), caregivers should wear a mask when they are in the same room as the patient.  Wear a disposable facemask and gloves when you touch or have contact with the patient's blood, stool, or body fluids, such as saliva, sputum, nasal mucus, vomit, urine.  Throw out disposable facemasks and gloves after using them. Do not reuse.  When removing personal protective equipment, first remove and dispose of gloves. Then, immediately clean your hands with soap and water or alcohol-based hand . Next, remove and dispose of facemask, and immediately clean your hands again with soap and water or alcohol-based hand .  You should not share dishes, drinking glasses, cups, eating utensils, towels, bedding, or other items with the patient. After the patient uses these items, you should wash them thoroughly (see below Wash laundry thoroughly).  Clean all high-touch surfaces, such as counters, tabletops, doorknobs, bathroom fixtures, toilets, phones, keyboards, tablets, and bedside tables, every day. Also, clean any surfaces that may have blood, stool, or body fluids on them.  Use a household cleaning spray or wipe, according to the label instructions. Labels contain instructions for safe and effective use of the cleaning product including precautions you should take when applying the product, such as wearing gloves and making sure you have good ventilation during use of the product.  Wash laundry  thoroughly.  Immediately remove and wash clothes or bedding that have blood, stool, or body fluids on them.  Wear disposable gloves while handling soiled items and keep soiled items away from your body. Clean your hands (with soap and water or an alcohol-based hand ) immediately after removing your gloves.  Read and follow directions on labels of laundry or clothing items and detergent. In general, using a normal laundry detergent according to washing machine instructions and dry thoroughly using the warmest temperatures recommended on the clothing label.  Place all used disposable gloves, facemasks, and other contaminated items in a lined container before disposing of them with other household waste. Clean your hands (with soap and water or an alcohol-based hand ) immediately after handling these items. Soap and water should be used preferentially if hands are visibly dirty.  Discuss any additional questions with your [facility] or local health department or healthcare provider. Check available hours when contacting your local health department.    For more information see CDC link below.      https://www.cdc.gov/coronavirus/2019-ncov/hcp/guidance-prevent-spread.html#precautions        Sources:  [facility], [facility]      Instructions for Home Care of Patients and Caretakers with Coronavirus Disease 2019  Limit visitors to the home.  Older persons and those that have chronic medical conditions such as diabetes, lung and heart disease are at increased risk for illness.   If possible, patients should use a separate bedroom while recovering. Caregivers and household members should avoid prolonged contact with the patient which means to stay 6 feet away and avoid contact with cough droplets.  When close contact is necessary, wash your hands before and immediately after contact.   Perform hand hygiene frequently. Wash your hands often with soap and water for at least 20 seconds  or use an alcohol-based hand , covering all surfaces of your hands and rubbing them together until they feel dry.   Avoid touching your eyes, nose, and mouth with unwashed hands.  Avoid sharing household items with the patient. You should not share dishes, drinking glasses, cups, eating utensils, towels, bedding, or other items. After the patient uses these items, you should wash them thoroughly.  Wash laundry thoroughly.   Immediately remove and wash clothes or bedding that have blood, stool, or body fluids on them.  Clean all high-touch surfaces, such as counters, tabletops, doorknobs, bathroom fixtures, toilets, phones, keyboards, tablets, and bedside tables, every day.   Use a household cleaning spray or wipe, according to the label instructions. Labels contain instructions for safe and effective use of the cleaning product including precautions you should take when applying the product, such as wearing gloves and making sure you have good ventilation during use of the product.    For more information see CDC link below.      https://www.cdc.gov/coronavirus/2019-ncov/hcp/guidance-prevent-spread.html#precautions               If your symptoms worsen or if you have any other concerns, please contact [facility] at [phone].

## 2024-03-15 ENCOUNTER — OFFICE VISIT (OUTPATIENT)
Dept: PRIMARY CARE CLINIC | Facility: CLINIC | Age: 39
End: 2024-03-15
Payer: COMMERCIAL

## 2024-03-15 VITALS
BODY MASS INDEX: 37.06 KG/M2 | DIASTOLIC BLOOD PRESSURE: 70 MMHG | WEIGHT: 202.63 LBS | HEART RATE: 74 BPM | SYSTOLIC BLOOD PRESSURE: 110 MMHG | RESPIRATION RATE: 16 BRPM | OXYGEN SATURATION: 99 %

## 2024-03-15 DIAGNOSIS — G47.00 INSOMNIA, UNSPECIFIED TYPE: ICD-10-CM

## 2024-03-15 DIAGNOSIS — E55.9 VITAMIN D DEFICIENCY: ICD-10-CM

## 2024-03-15 DIAGNOSIS — Z13.0 SCREENING FOR DEFICIENCY ANEMIA: ICD-10-CM

## 2024-03-15 DIAGNOSIS — R63.5 WEIGHT GAIN: ICD-10-CM

## 2024-03-15 DIAGNOSIS — R45.1 AGITATION: ICD-10-CM

## 2024-03-15 DIAGNOSIS — Z13.220 ENCOUNTER FOR LIPID SCREENING FOR CARDIOVASCULAR DISEASE: ICD-10-CM

## 2024-03-15 DIAGNOSIS — Z13.1 SCREENING FOR DIABETES MELLITUS: ICD-10-CM

## 2024-03-15 DIAGNOSIS — N92.6 IRREGULAR MENSTRUAL CYCLE: ICD-10-CM

## 2024-03-15 DIAGNOSIS — Z13.6 ENCOUNTER FOR LIPID SCREENING FOR CARDIOVASCULAR DISEASE: ICD-10-CM

## 2024-03-15 DIAGNOSIS — Z00.00 ANNUAL PHYSICAL EXAM: Primary | ICD-10-CM

## 2024-03-15 DIAGNOSIS — R53.83 FATIGUE, UNSPECIFIED TYPE: ICD-10-CM

## 2024-03-15 DIAGNOSIS — E34.9 HORMONE DISTURBANCE: ICD-10-CM

## 2024-03-15 PROCEDURE — 3044F HG A1C LEVEL LT 7.0%: CPT | Mod: CPTII,S$GLB,, | Performed by: NURSE PRACTITIONER

## 2024-03-15 PROCEDURE — 99999 PR PBB SHADOW E&M-EST. PATIENT-LVL III: CPT | Mod: PBBFAC,,, | Performed by: NURSE PRACTITIONER

## 2024-03-15 PROCEDURE — 99395 PREV VISIT EST AGE 18-39: CPT | Mod: S$GLB,,, | Performed by: NURSE PRACTITIONER

## 2024-03-15 PROCEDURE — 1159F MED LIST DOCD IN RCRD: CPT | Mod: CPTII,S$GLB,, | Performed by: NURSE PRACTITIONER

## 2024-03-15 PROCEDURE — 3078F DIAST BP <80 MM HG: CPT | Mod: CPTII,S$GLB,, | Performed by: NURSE PRACTITIONER

## 2024-03-15 PROCEDURE — 1160F RVW MEDS BY RX/DR IN RCRD: CPT | Mod: CPTII,S$GLB,, | Performed by: NURSE PRACTITIONER

## 2024-03-15 PROCEDURE — 3074F SYST BP LT 130 MM HG: CPT | Mod: CPTII,S$GLB,, | Performed by: NURSE PRACTITIONER

## 2024-03-15 PROCEDURE — 3008F BODY MASS INDEX DOCD: CPT | Mod: CPTII,S$GLB,, | Performed by: NURSE PRACTITIONER

## 2024-03-15 NOTE — PROGRESS NOTES
[facility] Note    Chief Complaint      Chief Complaint   Patient presents with    Annual Exam     History of Present Illness      Charis Wells is a 38 y.o. female patient with chronic conditions of family history of breast cancer in mother, insomnia who presents today for annual.    Labs-ordered  Eye [id number]-date  Gyn-Dr. Shelby  Mammogram-needs    Vaccines:  COVID declines  Tdap -  Flu shot-declines    New problems:  Weight gain, complaints of a pinkish discharge for the last few months, irregular menstrual cycles-heavy, weight gain  Patient is still breastfeeding  Health Maintenance   Topic Date Due    TETANUS VACCINE  2031    Hepatitis C Screening  Completed    Lipid Panel  Completed       Past Medical History:   Diagnosis Date    Allergy     Chronic kidney disease     born with kidney disease been under control since age 12    Keloid cicatrix     Lipoma of unspecified site 2012       Past Surgical History:   Procedure Laterality Date     SECTION       SECTION N/A 2019    Procedure:  SECTION;  Surgeon: Lizzie Mackenzie MD;  Location: [facility];  Service: OB/GYN;  Laterality: N/A;     SECTION N/A 12/10/2021    Procedure:  SECTION;  Surgeon: Tiny Shelby MD;  Location: [facility];  Service: OB/GYN;  Laterality: N/A;    COLONOSCOPY N/A 2016    Procedure: COLONOSCOPY - Miralax split prep;  Surgeon: Jose Antonio Jr., MD;  Location: [facility];  Service: Endoscopy;  Laterality: N/A;    LIPOMA RESECTION      right buttocks    TENDON REPAIR      bilateral feet    TONSILLECTOMY         family history includes Asthma in her mother; Breast cancer (age of onset: 35) in her mother; Cancer in her maternal grandmother and mother; Diabetes in her paternal grandfather and paternal grandmother.    Social History     Tobacco Use    Smoking status: Never    Smokeless tobacco: Never   Substance Use Topics    Alcohol use:  No     Comment: occasional    Drug use: No       Review of Systems   Constitutional:  Positive for malaise/fatigue. Negative for chills and fever.   HENT:  Negative for congestion, sinus pain and sore throat.    Eyes:  Negative for blurred vision.   Respiratory:  Negative for cough, shortness of breath and wheezing.    Cardiovascular:  Negative for chest pain, palpitations and leg swelling.   Gastrointestinal:  Negative for abdominal pain, constipation, diarrhea, nausea and vomiting.   Genitourinary:  Negative for dysuria.   Musculoskeletal:  Negative for myalgias.   Skin:  Negative for rash.   Neurological:  Negative for dizziness, weakness and headaches.   Psychiatric/Behavioral:  Negative for depression. The patient is not nervous/anxious.         Outpatient Encounter Medications as of 3/15/2024   Medication Sig Dispense Refill    cetirizine (ZYRTEC) 10 MG tablet Take 1 tablet (10 mg total) by mouth daily as needed for Allergies or Rhinitis. 30 tablet 0    fluticasone propionate (FLONASE) 50 mcg/actuation nasal spray 1 spray (50 mcg total) by Each Nostril route 2 (two) times daily as needed for Rhinitis or Allergies. 16 g 0     No facility-administered encounter medications on file as of 3/15/2024.        Review of patient's allergies indicates:   Allergen Reactions    No known drug allergies        Physical Exam      Vital Signs  Pulse: 74  Resp: 16  SpO2: 99 %  BP: 110/70  BP Location: Right arm  Patient Position: Sitting  Height and Weight  Weight: 91.9 kg (202 lb 9.6 oz)    Physical Exam  Vitals and nursing note reviewed.   Constitutional:       General: She is not in acute distress.     Appearance: Normal appearance. She is well-developed. She is not ill-appearing.   HENT:      Head: Normocephalic and atraumatic.      Right Ear: External ear normal.      Left Ear: External ear normal.   Eyes:      Conjunctiva/sclera: Conjunctivae normal.      Pupils: Pupils are equal, round, and reactive to light.   Neck:       Thyroid: No thyromegaly.      Vascular: No JVD.      Trachea: No tracheal deviation.   Cardiovascular:      Rate and Rhythm: Normal rate and regular rhythm.      Heart sounds: Normal heart sounds. No murmur heard.  Pulmonary:      Effort: Pulmonary effort is normal.      Breath sounds: Normal breath sounds.   Chest:      Chest wall: No tenderness.   Abdominal:      General: Bowel sounds are normal.      Palpations: Abdomen is soft.      Tenderness: There is no abdominal tenderness. There is no guarding.   Musculoskeletal:         General: Normal range of motion.      Cervical back: Normal range of motion and neck supple.   Lymphadenopathy:      Cervical: No cervical adenopathy.   Skin:     General: Skin is warm and dry.   Neurological:      General: No focal deficit present.      Mental Status: She is alert and oriented to person, place, and time.   Psychiatric:         Mood and Affect: Mood normal.         Behavior: Behavior normal.         Thought Content: Thought content normal.         Judgment: Judgment normal.          Laboratory:  CBC:  Lab Results   Component Value Date    WBC 6.99 03/20/2024    RBC 4.81 03/20/2024    HGB 13.7 03/20/2024    HCT 40.9 03/20/2024     03/20/2024    MCV 85 03/20/2024    MCH 28.5 03/20/2024    MCHC 33.5 03/20/2024    MCHC 31.7 (L) 01/19/2023    MCHC 33.8 12/11/2021     CMP:  Lab Results   Component Value Date    GLU 86 03/20/2024    CALCIUM 9.2 03/20/2024    ALBUMIN 3.9 03/20/2024    PROT 6.8 03/20/2024     03/20/2024    K 4.2 03/20/2024    CO2 24 03/20/2024     03/20/2024    BUN 13 03/20/2024    ALKPHOS 63 03/20/2024    ALT 49 (H) 03/20/2024    AST 29 03/20/2024    BILITOT 0.3 03/20/2024    BILITOT 0.7 01/19/2023    BILITOT 0.8 01/04/2018     URINALYSIS:  Lab Results   Component Value Date    COLORU Yellow 03/20/2024    CLARITYU Clear 01/12/2023    SPECGRAV 1.020 03/20/2024    PHUR 5.0 03/20/2024    PROTEINUA Negative 03/20/2024    BACTERIA Moderate (A)  03/20/2024    NITRITE Negative 03/20/2024    LEUKOCYTESUR Negative 03/20/2024    UROBILINOGEN - 01/12/2023      LIPIDS:  Lab Results   Component Value Date    TSH 1.002 03/20/2024    TSH 0.785 01/19/2023    TSH 0.864 06/04/2018    HDL 49 03/20/2024    HDL 51 01/19/2023    HDL 54 08/01/2014    CHOL 182 03/20/2024    CHOL 183 01/19/2023    CHOL 165 08/01/2014    TRIG 105 03/20/2024    TRIG 65 01/19/2023    TRIG 48 08/01/2014    LDLCALC 112.0 03/20/2024    LDLCALC 119.0 01/19/2023    LDLCALC 101.4 08/01/2014    CHOLHDL 26.9 03/20/2024    CHOLHDL 27.9 01/19/2023    CHOLHDL 32.7 08/01/2014    NONHDLCHOL 133 03/20/2024    NONHDLCHOL 132 01/19/2023    NONHDLCHOL 111 08/01/2014    TOTALCHOLEST 3.7 03/20/2024    TOTALCHOLEST 3.6 01/19/2023    TOTALCHOLEST 3.1 08/01/2014     TSH:  Lab Results   Component Value Date    TSH 1.002 03/20/2024    TSH 0.785 01/19/2023    TSH 0.864 06/04/2018     A1C:  Lab Results   Component Value Date    HGBA1C 5.4 03/20/2024    HGBA1C 5.3 01/19/2023         Assessment/Plan     Charis Wells is a 38 y.o.female with:    Annual physical exam  -     CBC Auto Differential; Future; Expected date: 03/15/2024  -     Comprehensive Metabolic Panel; Future; Expected date: 03/15/2024  -     Hemoglobin A1C; Future; Expected date: 03/15/2024  -     Urinalysis; Future; Expected date: 03/15/2024  -     TSH; Future; Expected date: 03/15/2024  -     T4, Free; Future; Expected date: 03/15/2024  -     Lipid Panel; Future; Expected date: 03/15/2024  -     Iron and TIBC; Future; Expected date: 03/15/2024  -     Ferritin; Future; Expected date: 03/15/2024  -     Vitamin D; Future; Expected date: 03/15/2024  -     FOLLICLE STIMULATING HORMONE; Future; Expected date: 03/15/2024  -     TESTOSTERONE; Future; Expected date: 03/15/2024  -     LUTEINIZING HORMONE; Future; Expected date: 03/15/2024    Insomnia, unspecified type  -     CBC Auto Differential; Future; Expected date: 03/15/2024  -     Comprehensive  Metabolic Panel; Future; Expected date: 03/15/2024  -     Hemoglobin A1C; Future; Expected date: 03/15/2024  -     Urinalysis; Future; Expected date: 03/15/2024  -     TSH; Future; Expected date: 03/15/2024  -     T4, Free; Future; Expected date: 03/15/2024  -     Lipid Panel; Future; Expected date: 03/15/2024  -     Iron and TIBC; Future; Expected date: 03/15/2024  -     Ferritin; Future; Expected date: 03/15/2024  -     Vitamin D; Future; Expected date: 03/15/2024  -     FOLLICLE STIMULATING HORMONE; Future; Expected date: 03/15/2024  -     TESTOSTERONE; Future; Expected date: 03/15/2024  -     LUTEINIZING HORMONE; Future; Expected date: 03/15/2024    Screening for diabetes mellitus  -     CBC Auto Differential; Future; Expected date: 03/15/2024  -     Comprehensive Metabolic Panel; Future; Expected date: 03/15/2024  -     Hemoglobin A1C; Future; Expected date: 03/15/2024  -     Urinalysis; Future; Expected date: 03/15/2024  -     TSH; Future; Expected date: 03/15/2024  -     T4, Free; Future; Expected date: 03/15/2024  -     Lipid Panel; Future; Expected date: 03/15/2024  -     Iron and TIBC; Future; Expected date: 03/15/2024  -     Ferritin; Future; Expected date: 03/15/2024  -     Vitamin D; Future; Expected date: 03/15/2024  -     FOLLICLE STIMULATING HORMONE; Future; Expected date: 03/15/2024  -     TESTOSTERONE; Future; Expected date: 03/15/2024  -     LUTEINIZING HORMONE; Future; Expected date: 03/15/2024    Fatigue, unspecified type  -     CBC Auto Differential; Future; Expected date: 03/15/2024  -     Comprehensive Metabolic Panel; Future; Expected date: 03/15/2024  -     Hemoglobin A1C; Future; Expected date: 03/15/2024  -     Urinalysis; Future; Expected date: 03/15/2024  -     TSH; Future; Expected date: 03/15/2024  -     T4, Free; Future; Expected date: 03/15/2024  -     Lipid Panel; Future; Expected date: 03/15/2024  -     Iron and TIBC; Future; Expected date: 03/15/2024  -     Ferritin; Future;  Expected date: 03/15/2024  -     Vitamin D; Future; Expected date: 03/15/2024  -     FOLLICLE STIMULATING HORMONE; Future; Expected date: 03/15/2024  -     TESTOSTERONE; Future; Expected date: 03/15/2024  -     LUTEINIZING HORMONE; Future; Expected date: 03/15/2024    Weight gain  -     CBC Auto Differential; Future; Expected date: 03/15/2024  -     Comprehensive Metabolic Panel; Future; Expected date: 03/15/2024  -     Hemoglobin A1C; Future; Expected date: 03/15/2024  -     Urinalysis; Future; Expected date: 03/15/2024  -     TSH; Future; Expected date: 03/15/2024  -     T4, Free; Future; Expected date: 03/15/2024  -     Lipid Panel; Future; Expected date: 03/15/2024  -     Iron and TIBC; Future; Expected date: 03/15/2024  -     Ferritin; Future; Expected date: 03/15/2024  -     Vitamin D; Future; Expected date: 03/15/2024  -     FOLLICLE STIMULATING HORMONE; Future; Expected date: 03/15/2024  -     TESTOSTERONE; Future; Expected date: 03/15/2024  -     LUTEINIZING HORMONE; Future; Expected date: 03/15/2024    Irregular menstrual cycle  -     CBC Auto Differential; Future; Expected date: 03/15/2024  -     Comprehensive Metabolic Panel; Future; Expected date: 03/15/2024  -     Hemoglobin A1C; Future; Expected date: 03/15/2024  -     Urinalysis; Future; Expected date: 03/15/2024  -     TSH; Future; Expected date: 03/15/2024  -     T4, Free; Future; Expected date: 03/15/2024  -     Lipid Panel; Future; Expected date: 03/15/2024  -     Iron and TIBC; Future; Expected date: 03/15/2024  -     Ferritin; Future; Expected date: 03/15/2024  -     Vitamin D; Future; Expected date: 03/15/2024  -     FOLLICLE STIMULATING HORMONE; Future; Expected date: 03/15/2024  -     TESTOSTERONE; Future; Expected date: 03/15/2024  -     LUTEINIZING HORMONE; Future; Expected date: 03/15/2024    Agitation  -     CBC Auto Differential; Future; Expected date: 03/15/2024  -     Comprehensive Metabolic Panel; Future; Expected date: 03/15/2024  -      Hemoglobin A1C; Future; Expected date: 03/15/2024  -     Urinalysis; Future; Expected date: 03/15/2024  -     TSH; Future; Expected date: 03/15/2024  -     T4, Free; Future; Expected date: 03/15/2024  -     Lipid Panel; Future; Expected date: 03/15/2024  -     Iron and TIBC; Future; Expected date: 03/15/2024  -     Ferritin; Future; Expected date: 03/15/2024  -     Vitamin D; Future; Expected date: 03/15/2024  -     FOLLICLE STIMULATING HORMONE; Future; Expected date: 03/15/2024  -     TESTOSTERONE; Future; Expected date: 03/15/2024  -     LUTEINIZING HORMONE; Future; Expected date: 03/15/2024    Encounter for lipid screening for cardiovascular disease  -     CBC Auto Differential; Future; Expected date: 03/15/2024  -     Comprehensive Metabolic Panel; Future; Expected date: 03/15/2024  -     Hemoglobin A1C; Future; Expected date: 03/15/2024  -     Urinalysis; Future; Expected date: 03/15/2024  -     TSH; Future; Expected date: 03/15/2024  -     T4, Free; Future; Expected date: 03/15/2024  -     Lipid Panel; Future; Expected date: 03/15/2024  -     Iron and TIBC; Future; Expected date: 03/15/2024  -     Ferritin; Future; Expected date: 03/15/2024  -     Vitamin D; Future; Expected date: 03/15/2024  -     FOLLICLE STIMULATING HORMONE; Future; Expected date: 03/15/2024  -     TESTOSTERONE; Future; Expected date: 03/15/2024  -     LUTEINIZING HORMONE; Future; Expected date: 03/15/2024    Screening for deficiency anemia  -     CBC Auto Differential; Future; Expected date: 03/15/2024  -     Comprehensive Metabolic Panel; Future; Expected date: 03/15/2024  -     Hemoglobin A1C; Future; Expected date: 03/15/2024  -     Urinalysis; Future; Expected date: 03/15/2024  -     TSH; Future; Expected date: 03/15/2024  -     T4, Free; Future; Expected date: 03/15/2024  -     Lipid Panel; Future; Expected date: 03/15/2024  -     Iron and TIBC; Future; Expected date: 03/15/2024  -     Ferritin; Future; Expected date:  03/15/2024  -     Vitamin D; Future; Expected date: 03/15/2024  -     FOLLICLE STIMULATING HORMONE; Future; Expected date: 03/15/2024  -     TESTOSTERONE; Future; Expected date: 03/15/2024  -     LUTEINIZING HORMONE; Future; Expected date: 03/15/2024    Vitamin D deficiency  -     CBC Auto Differential; Future; Expected date: 03/15/2024  -     Comprehensive Metabolic Panel; Future; Expected date: 03/15/2024  -     Hemoglobin A1C; Future; Expected date: 03/15/2024  -     Urinalysis; Future; Expected date: 03/15/2024  -     TSH; Future; Expected date: 03/15/2024  -     T4, Free; Future; Expected date: 03/15/2024  -     Lipid Panel; Future; Expected date: 03/15/2024  -     Iron and TIBC; Future; Expected date: 03/15/2024  -     Ferritin; Future; Expected date: 03/15/2024  -     Vitamin D; Future; Expected date: 03/15/2024  -     FOLLICLE STIMULATING HORMONE; Future; Expected date: 03/15/2024  -     TESTOSTERONE; Future; Expected date: 03/15/2024  -     LUTEINIZING HORMONE; Future; Expected date: 03/15/2024    Hormone disturbance  -     CBC Auto Differential; Future; Expected date: 03/15/2024  -     Comprehensive Metabolic Panel; Future; Expected date: 03/15/2024  -     Hemoglobin A1C; Future; Expected date: 03/15/2024  -     Urinalysis; Future; Expected date: 03/15/2024  -     TSH; Future; Expected date: 03/15/2024  -     T4, Free; Future; Expected date: 03/15/2024  -     Lipid Panel; Future; Expected date: 03/15/2024  -     Iron and TIBC; Future; Expected date: 03/15/2024  -     Ferritin; Future; Expected date: 03/15/2024  -     Vitamin D; Future; Expected date: 03/15/2024  -     FOLLICLE STIMULATING HORMONE; Future; Expected date: 03/15/2024  -     TESTOSTERONE; Future; Expected date: 03/15/2024  -     LUTEINIZING HORMONE; Future; Expected date: 03/15/2024          There are no preventive care reminders to display for this patient.       I spent 45 minutes on the day of this encounter for preparing for, evaluating,  treating, and managing this patient.      -Continue current medications and maintain follow up with specialists.  Return to clinic in 1 year sooner for any concerns.    No follow-ups on file.       KARL Jett  [facility]

## 2024-03-20 ENCOUNTER — PATIENT MESSAGE (OUTPATIENT)
Dept: PRIMARY CARE CLINIC | Facility: CLINIC | Age: 39
End: 2024-03-20
Payer: COMMERCIAL

## 2024-03-20 ENCOUNTER — LAB VISIT (OUTPATIENT)
Dept: LAB | Facility: HOSPITAL | Age: 39
End: 2024-03-20
Attending: NURSE PRACTITIONER
Payer: COMMERCIAL

## 2024-03-20 DIAGNOSIS — Z00.00 ANNUAL PHYSICAL EXAM: ICD-10-CM

## 2024-03-20 DIAGNOSIS — Z13.1 SCREENING FOR DIABETES MELLITUS: ICD-10-CM

## 2024-03-20 DIAGNOSIS — G47.00 INSOMNIA, UNSPECIFIED TYPE: ICD-10-CM

## 2024-03-20 LAB
25(OH)D3+25(OH)D2 SERPL-MCNC: 29 NG/ML (ref 30–96)
ALBUMIN SERPL BCP-MCNC: 3.9 G/DL (ref 3.5–5.2)
ALP SERPL-CCNC: 63 U/L (ref 55–135)
ALT SERPL W/O P-5'-P-CCNC: 49 U/L (ref 10–44)
ANION GAP SERPL CALC-SCNC: 8 MMOL/L (ref 8–16)
AST SERPL-CCNC: 29 U/L (ref 10–40)
BACTERIA #/AREA URNS AUTO: ABNORMAL /HPF
BASOPHILS # BLD AUTO: 0.05 K/UL (ref 0–0.2)
BASOPHILS NFR BLD: 0.7 % (ref 0–1.9)
BILIRUB SERPL-MCNC: 0.3 MG/DL (ref 0.1–1)
BILIRUB UR QL STRIP: NEGATIVE
BUN SERPL-MCNC: 13 MG/DL (ref 6–20)
CALCIUM SERPL-MCNC: 9.2 MG/DL (ref 8.7–10.5)
CHLORIDE SERPL-SCNC: 107 MMOL/L (ref 95–110)
CHOLEST SERPL-MCNC: 182 MG/DL (ref 120–199)
CHOLEST/HDLC SERPL: 3.7 {RATIO} (ref 2–5)
CLARITY UR REFRACT.AUTO: ABNORMAL
CO2 SERPL-SCNC: 24 MMOL/L (ref 23–29)
COLOR UR AUTO: YELLOW
CREAT SERPL-MCNC: 0.8 MG/DL (ref 0.5–1.4)
DIFFERENTIAL METHOD BLD: NORMAL
EOSINOPHIL # BLD AUTO: 0.1 K/UL (ref 0–0.5)
EOSINOPHIL NFR BLD: 1.1 % (ref 0–8)
ERYTHROCYTE [DISTWIDTH] IN BLOOD BY AUTOMATED COUNT: 13.2 % (ref 11.5–14.5)
EST. GFR  (NO RACE VARIABLE): >60 ML/MIN/1.73 M^2
ESTIMATED AVG GLUCOSE: 108 MG/DL (ref 68–131)
FERRITIN SERPL-MCNC: 20 NG/ML (ref 20–300)
FSH SERPL-ACNC: 4.93 MIU/ML
GLUCOSE SERPL-MCNC: 86 MG/DL (ref 70–110)
GLUCOSE UR QL STRIP: NEGATIVE
HBA1C MFR BLD: 5.4 % (ref 4–5.6)
HCT VFR BLD AUTO: 40.9 % (ref 37–48.5)
HDLC SERPL-MCNC: 49 MG/DL (ref 40–75)
HDLC SERPL: 26.9 % (ref 20–50)
HGB BLD-MCNC: 13.7 G/DL (ref 12–16)
HGB UR QL STRIP: ABNORMAL
IMM GRANULOCYTES # BLD AUTO: 0.02 K/UL (ref 0–0.04)
IMM GRANULOCYTES NFR BLD AUTO: 0.3 % (ref 0–0.5)
IRON SERPL-MCNC: 57 UG/DL (ref 30–160)
KETONES UR QL STRIP: NEGATIVE
LDLC SERPL CALC-MCNC: 112 MG/DL (ref 63–159)
LEUKOCYTE ESTERASE UR QL STRIP: NEGATIVE
LH SERPL-ACNC: 4.5 MIU/ML
LYMPHOCYTES # BLD AUTO: 2 K/UL (ref 1–4.8)
LYMPHOCYTES NFR BLD: 28.9 % (ref 18–48)
MCH RBC QN AUTO: 28.5 PG (ref 27–31)
MCHC RBC AUTO-ENTMCNC: 33.5 G/DL (ref 32–36)
MCV RBC AUTO: 85 FL (ref 82–98)
MICROSCOPIC COMMENT: ABNORMAL
MONOCYTES # BLD AUTO: 0.4 K/UL (ref 0.3–1)
MONOCYTES NFR BLD: 5.9 % (ref 4–15)
NEUTROPHILS # BLD AUTO: 4.4 K/UL (ref 1.8–7.7)
NEUTROPHILS NFR BLD: 63.1 % (ref 38–73)
NITRITE UR QL STRIP: NEGATIVE
NONHDLC SERPL-MCNC: 133 MG/DL
NRBC BLD-RTO: 0 /100 WBC
PH UR STRIP: 5 [PH] (ref 5–8)
PLATELET # BLD AUTO: 312 K/UL (ref 150–450)
PMV BLD AUTO: 10 FL (ref 9.2–12.9)
POTASSIUM SERPL-SCNC: 4.2 MMOL/L (ref 3.5–5.1)
PROT SERPL-MCNC: 6.8 G/DL (ref 6–8.4)
PROT UR QL STRIP: NEGATIVE
RBC # BLD AUTO: 4.81 M/UL (ref 4–5.4)
RBC #/AREA URNS AUTO: 2 /HPF (ref 0–4)
SATURATED IRON: 15 % (ref 20–50)
SODIUM SERPL-SCNC: 139 MMOL/L (ref 136–145)
SP GR UR STRIP: 1.02 (ref 1–1.03)
SQUAMOUS #/AREA URNS AUTO: 34 /HPF
T4 FREE SERPL-MCNC: 0.87 NG/DL (ref 0.71–1.51)
TESTOST SERPL-MCNC: 36 NG/DL (ref 5–73)
TOTAL IRON BINDING CAPACITY: 371 UG/DL (ref 250–450)
TRANSFERRIN SERPL-MCNC: 251 MG/DL (ref 200–375)
TRIGL SERPL-MCNC: 105 MG/DL (ref 30–150)
TSH SERPL DL<=0.005 MIU/L-ACNC: 1 UIU/ML (ref 0.4–4)
URN SPEC COLLECT METH UR: ABNORMAL
WBC # BLD AUTO: 6.99 K/UL (ref 3.9–12.7)

## 2024-03-20 PROCEDURE — 81001 URINALYSIS AUTO W/SCOPE: CPT | Performed by: NURSE PRACTITIONER

## 2024-03-20 PROCEDURE — 84439 ASSAY OF FREE THYROXINE: CPT | Performed by: NURSE PRACTITIONER

## 2024-03-20 PROCEDURE — 83002 ASSAY OF GONADOTROPIN (LH): CPT | Performed by: NURSE PRACTITIONER

## 2024-03-20 PROCEDURE — 82728 ASSAY OF FERRITIN: CPT | Performed by: NURSE PRACTITIONER

## 2024-03-20 PROCEDURE — 83001 ASSAY OF GONADOTROPIN (FSH): CPT | Performed by: NURSE PRACTITIONER

## 2024-03-20 PROCEDURE — 83036 HEMOGLOBIN GLYCOSYLATED A1C: CPT | Performed by: NURSE PRACTITIONER

## 2024-03-20 PROCEDURE — 85025 COMPLETE CBC W/AUTO DIFF WBC: CPT | Performed by: NURSE PRACTITIONER

## 2024-03-20 PROCEDURE — 84403 ASSAY OF TOTAL TESTOSTERONE: CPT | Performed by: NURSE PRACTITIONER

## 2024-03-20 PROCEDURE — 80061 LIPID PANEL: CPT | Performed by: NURSE PRACTITIONER

## 2024-03-20 PROCEDURE — 84443 ASSAY THYROID STIM HORMONE: CPT | Performed by: NURSE PRACTITIONER

## 2024-03-20 PROCEDURE — 82306 VITAMIN D 25 HYDROXY: CPT | Performed by: NURSE PRACTITIONER

## 2024-03-20 PROCEDURE — 80053 COMPREHEN METABOLIC PANEL: CPT | Performed by: NURSE PRACTITIONER

## 2024-03-20 PROCEDURE — 83540 ASSAY OF IRON: CPT | Performed by: NURSE PRACTITIONER

## 2024-03-20 PROCEDURE — 36415 COLL VENOUS BLD VENIPUNCTURE: CPT | Performed by: NURSE PRACTITIONER

## 2024-06-04 ENCOUNTER — PATIENT MESSAGE (OUTPATIENT)
Dept: PRIMARY CARE CLINIC | Facility: CLINIC | Age: 39
End: 2024-06-04
Payer: COMMERCIAL

## 2024-06-04 DIAGNOSIS — B00.1 FEVER BLISTER: Primary | ICD-10-CM

## 2024-06-04 RX ORDER — VALACYCLOVIR HYDROCHLORIDE 1 G/1
TABLET, FILM COATED ORAL
Qty: 12 TABLET | Refills: 1 | Status: SHIPPED | OUTPATIENT
Start: 2024-06-04

## 2024-07-01 ENCOUNTER — PATIENT MESSAGE (OUTPATIENT)
Dept: OBSTETRICS AND GYNECOLOGY | Facility: CLINIC | Age: 39
End: 2024-07-01
Payer: COMMERCIAL

## 2024-07-23 ENCOUNTER — OFFICE VISIT (OUTPATIENT)
Dept: OBSTETRICS AND GYNECOLOGY | Facility: CLINIC | Age: 39
End: 2024-07-23
Payer: COMMERCIAL

## 2024-07-23 VITALS
WEIGHT: 204.56 LBS | DIASTOLIC BLOOD PRESSURE: 70 MMHG | SYSTOLIC BLOOD PRESSURE: 108 MMHG | HEIGHT: 62 IN | BODY MASS INDEX: 37.64 KG/M2

## 2024-07-23 DIAGNOSIS — Z01.419 WELL WOMAN EXAM WITH ROUTINE GYNECOLOGICAL EXAM: Primary | ICD-10-CM

## 2024-07-23 DIAGNOSIS — Z80.3 FAMILY HISTORY OF BREAST CANCER IN MOTHER: ICD-10-CM

## 2024-07-23 PROCEDURE — 99395 PREV VISIT EST AGE 18-39: CPT | Mod: S$GLB,,, | Performed by: OBSTETRICS & GYNECOLOGY

## 2024-07-23 PROCEDURE — 3044F HG A1C LEVEL LT 7.0%: CPT | Mod: CPTII,S$GLB,, | Performed by: OBSTETRICS & GYNECOLOGY

## 2024-07-23 PROCEDURE — 1159F MED LIST DOCD IN RCRD: CPT | Mod: CPTII,S$GLB,, | Performed by: OBSTETRICS & GYNECOLOGY

## 2024-07-23 PROCEDURE — 3078F DIAST BP <80 MM HG: CPT | Mod: CPTII,S$GLB,, | Performed by: OBSTETRICS & GYNECOLOGY

## 2024-07-23 PROCEDURE — 3074F SYST BP LT 130 MM HG: CPT | Mod: CPTII,S$GLB,, | Performed by: OBSTETRICS & GYNECOLOGY

## 2024-07-23 PROCEDURE — 99999 PR PBB SHADOW E&M-EST. PATIENT-LVL III: CPT | Mod: PBBFAC,,, | Performed by: OBSTETRICS & GYNECOLOGY

## 2024-07-23 PROCEDURE — 3008F BODY MASS INDEX DOCD: CPT | Mod: CPTII,S$GLB,, | Performed by: OBSTETRICS & GYNECOLOGY

## 2024-07-23 NOTE — PROGRESS NOTES
History & Physical  Gynecology      SUBJECTIVE:     Chief Complaint: Well Woman       History of Present Illness:  Annual Exam-Premenopausal  Patient presents for annual exam. She has no complaints today. Menstrual cycles are mostly monthly but will have a few days of pink discharge following her cycle.  She is sexually active. GYN screening history: last pap: approximate date  paphpv and was normal. She participates in regular exercise: no.  Smoking status:  no    Contraception: vasectomy    FH:  Breast cancer: mother- has not seen breast surgery  Colon cancer: neg  Ovarian cancer: neg    Review of patient's allergies indicates:   Allergen Reactions    No known drug allergies        Past Medical History:   Diagnosis Date    Allergy     Chronic kidney disease     born with kidney disease been under control since age 12    Keloid cicatrix     Lipoma of unspecified site 2012     Past Surgical History:   Procedure Laterality Date     SECTION       SECTION N/A 2019    Procedure:  SECTION;  Surgeon: Lizzie Mackenzie MD;  Location: [facility] L&D;  Service: OB/GYN;  Laterality: N/A;     SECTION N/A 12/10/2021    Procedure:  SECTION;  Surgeon: Tiny Shelby MD;  [facility];  Service: OB/GYN;  Laterality: N/A;    COLONOSCOPY N/A 2016    Procedure: COLONOSCOPY - Miralax split prep;  Surgeon: Jose Antonio Jr., MD;  Location: [facility];  Service: Endoscopy;  Laterality: N/A;    LIPOMA RESECTION      right buttocks    TENDON REPAIR      bilateral feet    TONSILLECTOMY       OB History          5    Para   2    Term   2            AB   2    Living   2         SAB   1    IAB        Ectopic   1    Multiple   0    Live Births   2               Family History   Problem Relation Name Age of Onset    Cancer Mother Lucio Louie         breast    Breast cancer Mother Lucio Louie 35        , negative genetic testing    Asthma Mother Lucio Louie      Cancer Maternal Grandmother Andree Castro Sehrt         throat    Diabetes Paternal Grandfather      Diabetes Paternal Grandmother Dana Louie     Melanoma Neg Hx      Colon cancer Neg Hx      Ovarian cancer Neg Hx       Social History     Tobacco Use    Smoking status: Never    Smokeless tobacco: Never   Substance Use Topics    Alcohol use: No     Comment: occasional    Drug use: No       Current Outpatient Medications   Medication Sig    valACYclovir (VALTREX) 1000 MG tablet Take 2 tabs by mouth twice daily x 3 days.     No current facility-administered medications for this visit.         Review of Systems:  Review of Systems   Constitutional:  Negative for appetite change, fever and unexpected weight change.   Respiratory:  Negative for shortness of breath.    Cardiovascular:  Negative for chest pain.   Gastrointestinal:  Negative for nausea and vomiting.   Genitourinary:  Positive for vaginal discharge. Negative for menorrhagia, menstrual problem, pelvic pain, vaginal bleeding, vaginal pain and vaginal odor.   Integumentary:  Negative for breast mass.   Breast: Negative for mass       OBJECTIVE:     Physical Exam:  Physical Exam  Vitals and nursing note reviewed.   Constitutional:       Appearance: She is well-developed.   Cardiovascular:      Rate and Rhythm: Normal rate and regular rhythm.      Heart sounds: Normal heart sounds.   Pulmonary:      Effort: Pulmonary effort is normal.      Breath sounds: Normal breath sounds.   Chest:   Breasts:     Breasts are symmetrical.      Right: No inverted nipple, mass, nipple discharge, skin change or tenderness.      Left: No inverted nipple, mass, nipple discharge, skin change or tenderness.   Abdominal:      Palpations: Abdomen is soft.   Genitourinary:     General: Normal vulva.      Labia:         Right: No rash, tenderness, lesion or injury.         Left: No rash, tenderness, lesion or injury.       Urethra: No prolapse, urethral pain, urethral swelling or urethral  lesion.      Vagina: Normal. No signs of injury and foreign body. No vaginal discharge, erythema, tenderness or bleeding.      Cervix: No cervical motion tenderness, discharge or friability.      Uterus: Not deviated, not enlarged, not fixed and not tender.       Adnexa:         Right: No mass, tenderness or fullness.          Left: No mass, tenderness or fullness.        Rectum: No anal fissure or external hemorrhoid.      Comments: Urethral meatus: normal size, anterior vaginal wall with no prolapse, no lesions  Bladder: no fullness, masses or tenderness  Musculoskeletal:      Cervical back: Normal range of motion.   Neurological:      Mental Status: She is alert and oriented to person, place, and time.   Psychiatric:         Behavior: Behavior normal.         Thought Content: Thought content normal.         Judgment: Judgment normal.         Chaperoned by: n/a    ASSESSMENT:       ICD-10-CM ICD-9-CM    1. Well woman exam with routine gynecological exam  Z01.419 V72.31       2. Family history of breast cancer in mother  Z80.3 V16.3 Ambulatory referral/consult to Breast Surgery               Plan:      Charis was seen today for well woman.    Diagnoses and all orders for this visit:    Well woman exam with routine gynecological exam    Family history of breast cancer in mother  -     Ambulatory referral/consult to Breast Surgery; Future          Orders Placed This Encounter   Procedures    Ambulatory referral/consult to Breast Surgery       Well Woman:  - Pap smear up to date  - Birth control: vasectomy  - GC/CT:n/a  - Mammogram: due age 40, referral to breast center placed again this year  - Smoking cessation: n/a  - Labs: none required   - Vaccines: none required  - Exercise counseling      Follow up in  one year for annual or prn.    Tiny Shelby

## 2024-10-09 ENCOUNTER — PATIENT MESSAGE (OUTPATIENT)
Dept: OBSTETRICS AND GYNECOLOGY | Facility: CLINIC | Age: 39
End: 2024-10-09
Payer: COMMERCIAL

## 2024-10-09 DIAGNOSIS — Z12.31 ENCOUNTER FOR SCREENING MAMMOGRAM FOR MALIGNANT NEOPLASM OF BREAST: ICD-10-CM

## 2024-10-09 DIAGNOSIS — Z80.3 FAMILY HISTORY OF BREAST CANCER IN MOTHER: Primary | ICD-10-CM

## 2024-10-16 ENCOUNTER — TELEPHONE (OUTPATIENT)
Dept: OBSTETRICS AND GYNECOLOGY | Facility: CLINIC | Age: 39
End: 2024-10-16
Payer: COMMERCIAL

## 2024-10-16 DIAGNOSIS — Z12.31 ENCOUNTER FOR SCREENING MAMMOGRAM FOR MALIGNANT NEOPLASM OF BREAST: Primary | ICD-10-CM

## 2024-10-23 ENCOUNTER — HOSPITAL ENCOUNTER (OUTPATIENT)
Dept: RADIOLOGY | Facility: HOSPITAL | Age: 39
Discharge: HOME | End: 2024-10-23
Attending: OBSTETRICS & GYNECOLOGY
Payer: COMMERCIAL

## 2024-10-23 DIAGNOSIS — Z80.3 FAMILY HISTORY OF BREAST CANCER IN MOTHER: ICD-10-CM

## 2024-10-23 DIAGNOSIS — Z12.31 ENCOUNTER FOR SCREENING MAMMOGRAM FOR MALIGNANT NEOPLASM OF BREAST: ICD-10-CM

## 2024-10-23 PROCEDURE — 77067 SCR MAMMO BI INCL CAD: CPT | Mod: TC

## 2024-10-23 PROCEDURE — 77063 BREAST TOMOSYNTHESIS BI: CPT | Mod: TC

## 2024-11-21 ENCOUNTER — PATIENT MESSAGE (OUTPATIENT)
Dept: OBSTETRICS AND GYNECOLOGY | Facility: CLINIC | Age: 39
End: 2024-11-21
Payer: COMMERCIAL

## 2024-12-03 ENCOUNTER — OFFICE VISIT (OUTPATIENT)
Dept: OBSTETRICS AND GYNECOLOGY | Facility: CLINIC | Age: 39
End: 2024-12-03
Payer: COMMERCIAL

## 2024-12-03 DIAGNOSIS — L90.5 SCAR TISSUE: Primary | ICD-10-CM

## 2024-12-03 PROCEDURE — 3044F HG A1C LEVEL LT 7.0%: CPT | Mod: CPTII,95,, | Performed by: OBSTETRICS & GYNECOLOGY

## 2024-12-03 PROCEDURE — 99214 OFFICE O/P EST MOD 30 MIN: CPT | Mod: 95,,, | Performed by: OBSTETRICS & GYNECOLOGY

## 2024-12-03 NOTE — PROGRESS NOTES
Gynecology    SUBJECTIVE:     Chief Complaint: Pelvic Pain (/)       History of Present Illness:  39 year old who presents virtually to discuss lower left sided pain that comes and goes.  Notices it more when she is exercising or doing something on her stomach, then she feels it more.  Also feels it when she leans in a certain position.  Has known it's been there for a while, since her last CS three years ago.  The pain is mostly tender under the skin.  The pain is not on her CS scar.  Does not feel it with intercourse.  Doesn't feel a bulge in the area or a hole or a defect.  She will sometimes have severe, quick pain if someone were to push into that spot.  Otherwise, the pain is dull and occasional.  Has no fever, chills, nausea, vomiting. No loss of appetite.  No weight change.      Review of Systems:  Review of Systems   Constitutional:  Negative for chills and fever.   Gastrointestinal:  Positive for abdominal pain. Negative for constipation, diarrhea, nausea and vomiting.   Genitourinary:  Negative for menorrhagia, menstrual problem, pelvic pain, vaginal bleeding, vaginal discharge, vaginal pain and vaginal odor.        OBJECTIVE:     Physical Exam:  Physical Exam  Vitals and nursing note reviewed.   Constitutional:       Appearance: She is well-developed.   Pulmonary:      Effort: Pulmonary effort is normal.   Skin:     Coloration: Skin is not pale.   Neurological:      Mental Status: She is oriented to person, place, and time.   Psychiatric:         Behavior: Behavior normal.         Thought Content: Thought content normal.         Judgment: Judgment normal.         ASSESSMENT:       ICD-10-CM ICD-9-CM    1. Scar tissue  L90.5 709.2              Plan:      Charis was seen today for pelvic pain.    Diagnoses and all orders for this visit:    Scar tissue    The patient location is: [place], [place]  The chief complaint leading to consultation is: abdominal pain    Visit type: audiovisual    Face to Face time  with patient: 20  30 minutes of total time spent on the encounter, which includes face to face time and non-face to face time preparing to see the patient (eg, review of tests), Obtaining and/or reviewing separately obtained history, Documenting clinical information in the electronic or other health record, Independently interpreting results (not separately reported) and communicating results to the patient/family/caregiver, or Care coordination (not separately reported).         Each patient to whom he or she provides medical services by telemedicine is:  (1) informed of the relationship between the physician and patient and the respective role of any other health care provider with respect to management of the patient; and (2) notified that he or she may decline to receive medical services by telemedicine and may withdraw from such care at any time.    Notes:     - suspect pain is scar tissue based on her description; discussed getting an ultrasound to confirm no abnormality, however pain is under the skin, not in her pelvis  - discussed referral to general or plastic surgery; patient dose not want surgery;    - pains has been consistent and has not increased in three years; advised patient to notify me if it becomes severe; however it is currently very mild;     No orders of the defined types were placed in this encounter.        Tiny Shelby

## 2025-07-21 ENCOUNTER — PATIENT MESSAGE (OUTPATIENT)
Dept: PRIMARY CARE CLINIC | Facility: CLINIC | Age: 40
End: 2025-07-21
Payer: COMMERCIAL

## 2025-07-21 DIAGNOSIS — B02.9 HERPES ZOSTER WITHOUT COMPLICATION: Primary | ICD-10-CM

## 2025-07-22 RX ORDER — VALACYCLOVIR HYDROCHLORIDE 1 G/1
1000 TABLET, FILM COATED ORAL 3 TIMES DAILY
Qty: 21 TABLET | Refills: 0 | Status: SHIPPED | OUTPATIENT
Start: 2025-07-22 | End: 2026-07-22

## 2025-07-24 DIAGNOSIS — B02.9 HERPES ZOSTER WITHOUT COMPLICATION: Primary | ICD-10-CM

## 2025-07-24 RX ORDER — LIDOCAINE 50 MG/G
1 PATCH TOPICAL DAILY
Qty: 15 PATCH | Refills: 0 | Status: SHIPPED | OUTPATIENT
Start: 2025-07-24

## 2025-07-24 RX ORDER — GABAPENTIN 100 MG/1
100 CAPSULE ORAL 3 TIMES DAILY
Qty: 90 CAPSULE | Refills: 0 | Status: SHIPPED | OUTPATIENT
Start: 2025-07-24

## 2025-07-24 NOTE — TELEPHONE ENCOUNTER
LOV-3/15/5-Mariama Reed, NP     Pt states:I am extremely uncomfortable. The pain seems to be getting worse and Tylenol isnt helping. Im trying calamine and cold compresses - what else do you suggest?

## 2025-08-05 ENCOUNTER — OFFICE VISIT (OUTPATIENT)
Dept: PRIMARY CARE CLINIC | Facility: CLINIC | Age: 40
End: 2025-08-05
Payer: COMMERCIAL

## 2025-08-05 VITALS
DIASTOLIC BLOOD PRESSURE: 60 MMHG | HEIGHT: 62 IN | HEART RATE: 68 BPM | OXYGEN SATURATION: 98 % | WEIGHT: 187.81 LBS | BODY MASS INDEX: 34.56 KG/M2 | SYSTOLIC BLOOD PRESSURE: 106 MMHG | RESPIRATION RATE: 16 BRPM

## 2025-08-05 DIAGNOSIS — E55.9 VITAMIN D DEFICIENCY: ICD-10-CM

## 2025-08-05 DIAGNOSIS — F51.02 ADJUSTMENT INSOMNIA: ICD-10-CM

## 2025-08-05 DIAGNOSIS — G47.00 INSOMNIA, UNSPECIFIED TYPE: ICD-10-CM

## 2025-08-05 DIAGNOSIS — Z13.6 ENCOUNTER FOR LIPID SCREENING FOR CARDIOVASCULAR DISEASE: ICD-10-CM

## 2025-08-05 DIAGNOSIS — R10.2 PELVIC PAIN: ICD-10-CM

## 2025-08-05 DIAGNOSIS — Z00.00 ANNUAL PHYSICAL EXAM: Primary | ICD-10-CM

## 2025-08-05 DIAGNOSIS — Z13.220 ENCOUNTER FOR LIPID SCREENING FOR CARDIOVASCULAR DISEASE: ICD-10-CM

## 2025-08-05 DIAGNOSIS — Z13.1 SCREENING FOR DIABETES MELLITUS: ICD-10-CM

## 2025-08-05 PROCEDURE — 99999 PR PBB SHADOW E&M-EST. PATIENT-LVL III: CPT | Mod: PBBFAC,,, | Performed by: NURSE PRACTITIONER

## 2025-08-05 PROCEDURE — 3078F DIAST BP <80 MM HG: CPT | Mod: CPTII,S$GLB,, | Performed by: NURSE PRACTITIONER

## 2025-08-05 PROCEDURE — 1160F RVW MEDS BY RX/DR IN RCRD: CPT | Mod: CPTII,S$GLB,, | Performed by: NURSE PRACTITIONER

## 2025-08-05 PROCEDURE — 99395 PREV VISIT EST AGE 18-39: CPT | Mod: S$GLB,,, | Performed by: NURSE PRACTITIONER

## 2025-08-05 PROCEDURE — 3074F SYST BP LT 130 MM HG: CPT | Mod: CPTII,S$GLB,, | Performed by: NURSE PRACTITIONER

## 2025-08-05 PROCEDURE — 1159F MED LIST DOCD IN RCRD: CPT | Mod: CPTII,S$GLB,, | Performed by: NURSE PRACTITIONER

## 2025-08-05 PROCEDURE — 3008F BODY MASS INDEX DOCD: CPT | Mod: CPTII,S$GLB,, | Performed by: NURSE PRACTITIONER

## 2025-08-05 PROCEDURE — 3044F HG A1C LEVEL LT 7.0%: CPT | Mod: CPTII,S$GLB,, | Performed by: NURSE PRACTITIONER

## 2025-08-05 RX ORDER — TRAZODONE HYDROCHLORIDE 50 MG/1
50 TABLET ORAL NIGHTLY
Qty: 30 TABLET | Refills: 11 | Status: SHIPPED | OUTPATIENT
Start: 2025-08-05 | End: 2026-08-05

## 2025-08-19 ENCOUNTER — LAB VISIT (OUTPATIENT)
Dept: LAB | Facility: HOSPITAL | Age: 40
End: 2025-08-19
Attending: NURSE PRACTITIONER
Payer: COMMERCIAL

## 2025-08-19 DIAGNOSIS — Z00.00 ANNUAL PHYSICAL EXAM: ICD-10-CM

## 2025-08-19 DIAGNOSIS — F51.02 ADJUSTMENT INSOMNIA: ICD-10-CM

## 2025-08-19 LAB
25(OH)D3+25(OH)D2 SERPL-MCNC: 57 NG/ML (ref 30–96)
ABSOLUTE EOSINOPHIL (OHS): 0.07 K/UL
ABSOLUTE MONOCYTE (OHS): 0.37 K/UL (ref 0.3–1)
ABSOLUTE NEUTROPHIL COUNT (OHS): 4.87 K/UL (ref 1.8–7.7)
ALBUMIN SERPL BCP-MCNC: 4.2 G/DL (ref 3.5–5.2)
ALP SERPL-CCNC: 49 UNIT/L (ref 40–150)
ALT SERPL W/O P-5'-P-CCNC: 24 UNIT/L (ref 0–55)
ANION GAP (OHS): 10 MMOL/L (ref 8–16)
AST SERPL-CCNC: 25 UNIT/L (ref 0–50)
BASOPHILS # BLD AUTO: 0.05 K/UL
BASOPHILS NFR BLD AUTO: 0.7 %
BILIRUB SERPL-MCNC: 0.5 MG/DL (ref 0.1–1)
BUN SERPL-MCNC: 15 MG/DL (ref 6–20)
CALCIUM SERPL-MCNC: 9.1 MG/DL (ref 8.7–10.5)
CHLORIDE SERPL-SCNC: 108 MMOL/L (ref 95–110)
CHOLEST SERPL-MCNC: 165 MG/DL (ref 120–199)
CHOLEST/HDLC SERPL: 3.6 {RATIO} (ref 2–5)
CO2 SERPL-SCNC: 21 MMOL/L (ref 23–29)
CREAT SERPL-MCNC: 0.8 MG/DL (ref 0.5–1.4)
EAG (OHS): 108 MG/DL (ref 68–131)
ERYTHROCYTE [DISTWIDTH] IN BLOOD BY AUTOMATED COUNT: 13.5 % (ref 11.5–14.5)
GFR SERPLBLD CREATININE-BSD FMLA CKD-EPI: >60 ML/MIN/1.73/M2
GLUCOSE SERPL-MCNC: 89 MG/DL (ref 70–110)
HBA1C MFR BLD: 5.4 % (ref 4–5.6)
HCT VFR BLD AUTO: 38.4 % (ref 37–48.5)
HDLC SERPL-MCNC: 46 MG/DL (ref 40–75)
HDLC SERPL: 27.9 % (ref 20–50)
HGB BLD-MCNC: 12.9 GM/DL (ref 12–16)
IMM GRANULOCYTES # BLD AUTO: 0.01 K/UL (ref 0–0.04)
IMM GRANULOCYTES NFR BLD AUTO: 0.1 % (ref 0–0.5)
LDLC SERPL CALC-MCNC: 99.4 MG/DL (ref 63–159)
LYMPHOCYTES # BLD AUTO: 1.68 K/UL (ref 1–4.8)
MCH RBC QN AUTO: 28 PG (ref 27–31)
MCHC RBC AUTO-ENTMCNC: 33.6 G/DL (ref 32–36)
MCV RBC AUTO: 83 FL (ref 82–98)
NONHDLC SERPL-MCNC: 119 MG/DL
NUCLEATED RBC (/100WBC) (OHS): 0 /100 WBC
PLATELET # BLD AUTO: 301 K/UL (ref 150–450)
PMV BLD AUTO: 10.3 FL (ref 9.2–12.9)
POTASSIUM SERPL-SCNC: 3.9 MMOL/L (ref 3.5–5.1)
PROT SERPL-MCNC: 6.8 GM/DL (ref 6–8.4)
RBC # BLD AUTO: 4.61 M/UL (ref 4–5.4)
RELATIVE EOSINOPHIL (OHS): 1 %
RELATIVE LYMPHOCYTE (OHS): 23.8 % (ref 18–48)
RELATIVE MONOCYTE (OHS): 5.2 % (ref 4–15)
RELATIVE NEUTROPHIL (OHS): 69.2 % (ref 38–73)
SODIUM SERPL-SCNC: 139 MMOL/L (ref 136–145)
TRIGL SERPL-MCNC: 98 MG/DL (ref 30–150)
TSH SERPL-ACNC: 0.94 UIU/ML (ref 0.4–4)
WBC # BLD AUTO: 7.05 K/UL (ref 3.9–12.7)

## 2025-08-19 PROCEDURE — 84478 ASSAY OF TRIGLYCERIDES: CPT

## 2025-08-19 PROCEDURE — 84443 ASSAY THYROID STIM HORMONE: CPT

## 2025-08-19 PROCEDURE — 80051 ELECTROLYTE PANEL: CPT

## 2025-08-19 PROCEDURE — 83036 HEMOGLOBIN GLYCOSYLATED A1C: CPT

## 2025-08-19 PROCEDURE — 36415 COLL VENOUS BLD VENIPUNCTURE: CPT

## 2025-08-19 PROCEDURE — 85025 COMPLETE CBC W/AUTO DIFF WBC: CPT

## 2025-08-19 PROCEDURE — 82306 VITAMIN D 25 HYDROXY: CPT
